# Patient Record
Sex: FEMALE | Race: WHITE | Employment: UNEMPLOYED | URBAN - METROPOLITAN AREA
[De-identification: names, ages, dates, MRNs, and addresses within clinical notes are randomized per-mention and may not be internally consistent; named-entity substitution may affect disease eponyms.]

---

## 2017-10-10 ENCOUNTER — OFFICE VISIT (OUTPATIENT)
Dept: INTERNAL MEDICINE CLINIC | Age: 62
End: 2017-10-10

## 2017-10-10 VITALS
HEIGHT: 64 IN | DIASTOLIC BLOOD PRESSURE: 90 MMHG | HEART RATE: 65 BPM | BODY MASS INDEX: 28.17 KG/M2 | TEMPERATURE: 98.2 F | OXYGEN SATURATION: 97 % | SYSTOLIC BLOOD PRESSURE: 145 MMHG | WEIGHT: 165 LBS

## 2017-10-10 DIAGNOSIS — E78.5 HYPERLIPIDEMIA, UNSPECIFIED HYPERLIPIDEMIA TYPE: ICD-10-CM

## 2017-10-10 DIAGNOSIS — Z23 ENCOUNTER FOR IMMUNIZATION: ICD-10-CM

## 2017-10-10 DIAGNOSIS — K21.9 GASTROESOPHAGEAL REFLUX DISEASE, ESOPHAGITIS PRESENCE NOT SPECIFIED: ICD-10-CM

## 2017-10-10 DIAGNOSIS — G43.809 OTHER MIGRAINE WITHOUT STATUS MIGRAINOSUS, NOT INTRACTABLE: ICD-10-CM

## 2017-10-10 DIAGNOSIS — J30.89 NON-SEASONAL ALLERGIC RHINITIS DUE TO OTHER ALLERGIC TRIGGER, UNSPECIFIED CHRONICITY: ICD-10-CM

## 2017-10-10 DIAGNOSIS — R03.0 ELEVATED BP WITHOUT DIAGNOSIS OF HYPERTENSION: ICD-10-CM

## 2017-10-10 DIAGNOSIS — R73.01 IFG (IMPAIRED FASTING GLUCOSE): ICD-10-CM

## 2017-10-10 DIAGNOSIS — Z76.89 ENCOUNTER TO ESTABLISH CARE WITH NEW DOCTOR: Primary | ICD-10-CM

## 2017-10-10 RX ORDER — SIMVASTATIN 20 MG/1
TABLET, FILM COATED ORAL
COMMUNITY
End: 2017-11-15 | Stop reason: SDUPTHER

## 2017-10-10 RX ORDER — FLUTICASONE PROPIONATE 50 MCG
2 SPRAY, SUSPENSION (ML) NASAL DAILY
COMMUNITY
End: 2017-10-12 | Stop reason: SDUPTHER

## 2017-10-10 RX ORDER — RANITIDINE 300 MG/1
CAPSULE ORAL
COMMUNITY
End: 2017-10-10 | Stop reason: ALTCHOICE

## 2017-10-10 RX ORDER — RANITIDINE 150 MG/1
150 TABLET, FILM COATED ORAL 2 TIMES DAILY
Qty: 180 TAB | Refills: 3 | Status: SHIPPED | OUTPATIENT
Start: 2017-10-10 | End: 2017-11-15 | Stop reason: SDUPTHER

## 2017-10-10 RX ORDER — ALPRAZOLAM 0.5 MG/1
TABLET ORAL
COMMUNITY
End: 2018-04-10 | Stop reason: SDUPTHER

## 2017-10-10 RX ORDER — BUTALBITAL, ACETAMINOPHEN AND CAFFEINE 300; 40; 50 MG/1; MG/1; MG/1
1 CAPSULE ORAL
Qty: 20 CAP | Refills: 1 | Status: SHIPPED | OUTPATIENT
Start: 2017-10-10 | End: 2017-12-28 | Stop reason: SDUPTHER

## 2017-10-10 NOTE — MR AVS SNAPSHOT
Visit Information Date & Time Provider Department Dept. Phone Encounter #  
 10/10/2017 10:15 AM Mike Morales MD [facility] [address] [id number] Follow-up Instructions Return in about 6 months (around 4/10/2018) for Physical.  
  
Upcoming Health Maintenance Date Due Hepatitis C Screening 1955 DTaP/Tdap/Td series (1 - Tdap) 4/5/1974 PAP AKA CERVICAL CYTOLOGY 4/5/1974 BREAST CANCER SCRN MAMMOGRAM 4/5/2003 FOBT Q 1 YEAR AGE 50-75 4/5/2003 ZOSTER VACCINE AGE 60> 2/5/2013 INFLUENZA AGE 9 TO ADULT 8/1/2017 Allergies as of 10/10/2017  Review Complete On: 10/10/2017 By: Adan Tariq LPN Severity Noted Reaction Type Reactions Aspirin  10/10/2017    Not Reported This Time Bupropion  10/10/2017    Nausea and Vomiting Floxin [Ofloxacin]  10/10/2017    Rash Ibuprofen  10/10/2017    Swelling Maxalt [Rizatriptan]  10/10/2017    Palpitations Prevacid [Lansoprazole]  10/10/2017    Rash Wellbutrin [Bupropion Hcl]  10/10/2017    Rash Current Immunizations  Never Reviewed Name Date Influenza Vaccine (Quad) PF  Incomplete Not reviewed this visit You Were Diagnosed With   
  
 Codes Comments Encounter to establish care with new doctor    -  Primary ICD-10-CM: Z76.89 
ICD-9-CM: V65.8 Hyperlipidemia, unspecified hyperlipidemia type     ICD-10-CM: E78.5 ICD-9-CM: 272.4 Elevated BP without diagnosis of hypertension     ICD-10-CM: R03.0 ICD-9-CM: 796.2 Non-seasonal allergic rhinitis due to other allergic trigger, unspecified chronicity     ICD-10-CM: J30.89 ICD-9-CM: 477.8 Gastroesophageal reflux disease, esophagitis presence not specified     ICD-10-CM: K21.9 ICD-9-CM: 530.81 Other migraine without status migrainosus, not intractable     ICD-10-CM: M45.266 ICD-9-CM: 346.80  IFG (impaired fasting glucose)     ICD-10-CM: R73.01 
ICD-9-CM: 790.21   
 Encounter for immunization     ICD-10-CM: K20 ICD-9-CM: V03.89 Vitals BP Pulse Temp Height(growth percentile) Weight(growth percentile) SpO2  
 155/88 (BP 1 Location: Right arm, BP Patient Position: Sitting) 65 98.2 °F (36.8 °C) (Oral) 5' 4\" (1.626 m) 165 lb (74.8 kg) 97% BMI Smoking Status 28.32 kg/m2 Never Smoker Vitals History BMI and BSA Data Body Mass Index Body Surface Area  
 28.32 kg/m 2 1.84 m 2 Preferred Pharmacy Pharmacy Name Phone [id number] Eleazar Szymanskiyousuf [address] [place] [address] [phone] Your Updated Medication List  
  
   
This list is accurate as of: 10/10/17 10:50 AM.  Always use your most recent med list.  
  
  
  
  
 butalbital-acetaminophen-caff -40 mg per capsule Commonly known as:  [employer] Take 1 Cap by mouth every six (6) hours as needed for Pain. Max Daily Amount: 4 Caps. FLONASE 50 mcg/actuation nasal spray Generic drug:  fluticasone 2 Sprays by Both Nostrils route daily. * OTHER Juice plus * OTHER Stool softener  
  
 raNITIdine 150 mg tablet Commonly known as:  ZANTAC Take 1 Tab by mouth two (2) times a day. simvastatin 20 mg tablet Commonly known as:  ZOCOR Take  by mouth nightly. XANAX 0.5 mg tablet Generic drug:  ALPRAZolam  
Take  by mouth. * Notice: This list has 2 medication(s) that are the same as other medications prescribed for you. Read the directions carefully, and ask your doctor or other care provider to review them with you. Prescriptions Printed Refills  
 butalbital-acetaminophen-caff (FIORICET) -40 mg per capsule 1 Sig: Take 1 Cap by mouth every six (6) hours as needed for Pain. Max Daily Amount: 4 Caps. Class: Print Route: Oral  
  
Prescriptions Sent to Pharmacy Refills  
 raNITIdine (ZANTAC) 150 mg tablet 3 Sig: Take 1 Tab by mouth two (2) times a day.   
 Class: Normal  
 [facility] [address] [place], [place] [address] #: [phone] Route: Oral  
  
We Performed the Following CBC WITH AUTOMATED DIFF [93315 CPT(R)] HEMOGLOBIN A1C WITH EAG [83017 CPT(R)] INFLUENZA VIRUS VAC QUAD,SPLIT,PRESV FREE SYRINGE IM [id number] CPT(R)] LIPID PANEL [48742 CPT(R)] METABOLIC PANEL, COMPREHENSIVE [08673 CPT(R)] AR IMMUNIZ ADMIN,1 SINGLE/COMB VAC/TOXOID [id number] CPT(R)] Follow-up Instructions Return in about 6 months (around 4/10/2018) for Physical.  
  
  
Introducing [address]! Michelle Green introduces [employer] patient portal. Now you can access parts of your medical record, email your doctor's office, and request medication refills online. 1. In your internet browser, go to [email or web address]. [email or web address] 2. Click on the First Time User? Click Here link in the Sign In box. You will see the New Member Sign Up page. 3. Enter your [employer] Access Code exactly as it appears below. You will not need to use this code after youve completed the sign-up process. If you do not sign up before the expiration date, you must request a new code. · [employer] Access Code: [email or web address] Expires: 1/8/2018 10:50 AM 
 
4. Enter the last four digits of your Social Security Number (xxxx) and Date of Birth (mm/dd/yyyy) as indicated and click Submit. You will be taken to the next sign-up page. 5. Create a [employer] ID. This will be your [employer] login ID and cannot be changed, so think of one that is secure and easy to remember. 6. Create a [employer] password. You can change your password at any time. 7. Enter your Password Reset Question and Answer. This can be used at a later time if you forget your password. 8. Enter your e-mail address. You will receive e-mail notification when new information is available in [address] 9. Click Sign Up. You can now view and download portions of your medical record. 10. Click the Download Summary menu link to download a portable copy of your medical information. If you have questions, please visit the Frequently Asked Questions section of the [employer] website. Remember, [employer] is NOT to be used for urgent needs. For medical emergencies, dial 911. Now available from your iPhone and Android! Please provide this summary of care documentation to your next provider. If you have any questions after today's visit, please call [phone].

## 2017-10-10 NOTE — PROGRESS NOTES
Cheryle Chimera is a 57 y.o. female who presents today for Establish Care (Dr Gigi Campos, previous PCP retired)        She has a history of   Patient Active Problem List   Diagnosis Code    Hyperlipidemia E78.5    IFG (impaired fasting glucose) R73.01    Gastroesophageal reflux disease K21.9     Today patient is here to establish care. Previous PCP Dr. Gigi Campos with [facility]. she is switching because she retired. .  Records are not available for me to review. I see Ms. Heath her friend. she does not have other concerns. Elevated BP: Treated years ago. On no meds for some time. Not checking regularly. HLD: on Zocor. Last checked in April and LDL is at 76. Anxiety: on PRN xanax. Last filled in October. Not filling monthly. AR: not using daily. History of shoulder issues. Rotator cuff repair to both sides. Social: Taking care of grand-daughter. Trained in [employer]. No tobacco, Social Tobacco.     Lives with Sasha Lemos at home.     has 3 children, 5 grandchildren. Family: Father with Celiacs. Mother:     HM:   GYN: [facility]. MMG UTD and normal   C-scope: UTD. ROS  Review of Systems   Constitutional: Negative for chills, fever, malaise/fatigue and weight loss. HENT: Negative for ear pain, hearing loss and tinnitus. Eyes: Negative for blurred vision, double vision, photophobia and pain. Respiratory: Negative for cough, hemoptysis, sputum production and shortness of breath. Cardiovascular: Negative for chest pain, palpitations, orthopnea, claudication and leg swelling. Gastrointestinal: Positive for abdominal pain (Stable). Negative for blood in stool, constipation, diarrhea, heartburn, nausea and vomiting. Genitourinary: Negative for dysuria, frequency, hematuria and urgency. Skin: Negative for itching and rash. Neurological: Negative. Endo/Heme/Allergies: Does not bruise/bleed easily.    Psychiatric/Behavioral: Negative for depression. The patient is not nervous/anxious. Visit Vitals    /90    Pulse 65    Temp 98.2 °F (36.8 °C) (Oral)    Ht 5' 4\" (1.626 m)    Wt 165 lb (74.8 kg)    SpO2 97%    BMI 28.32 kg/m2       Physical Exam   Constitutional: She is oriented to person, place, and time and well-developed, well-nourished, and in no distress. No distress. HENT:   Head: Normocephalic and atraumatic. Eyes: Conjunctivae are normal. Pupils are equal, round, and reactive to light. Neck: Normal range of motion. Neck supple. Cardiovascular: Normal rate and regular rhythm. Exam reveals no gallop. No murmur heard. Pulmonary/Chest: Effort normal and breath sounds normal.   Abdominal: Soft. Bowel sounds are normal. She exhibits no distension. Neurological: She is alert and oriented to person, place, and time. Skin: Skin is dry. She is not diaphoretic. Psychiatric: Affect and judgment normal.       Current Outpatient Prescriptions   Medication Sig    simvastatin (ZOCOR) 20 mg tablet Take  by mouth nightly.  fluticasone (FLONASE) 50 mcg/actuation nasal spray 2 Sprays by Both Nostrils route daily.  ALPRAZolam (XANAX) 0.5 mg tablet Take  by mouth.  OTHER Juice plus    OTHER Stool softener    raNITIdine (ZANTAC) 150 mg tablet Take 1 Tab by mouth two (2) times a day.  butalbital-acetaminophen-caff (FIORICET) -40 mg per capsule Take 1 Cap by mouth every six (6) hours as needed for Pain. Max Daily Amount: 4 Caps. No current facility-administered medications for this visit.          Past Medical History:   Diagnosis Date    Anxiety     Gluten intolerance     Sinus problem       Past Surgical History:   Procedure Laterality Date    HX SHOULDER ARTHROSCOPY      REPR VAGINAL PROLAPSE,UTEROSACRAL        Social History   Substance Use Topics    Smoking status: Never Smoker    Smokeless tobacco: Never Used    Alcohol use Yes      Comment: wine      Family History   Problem Relation Age of Onset    Diabetes Mother     Hypertension Mother     Celiac Disease Father     Celiac Disease Brother     Diabetes Maternal Grandmother         Allergies   Allergen Reactions    Aspirin Not Reported This Time    Bupropion Nausea and Vomiting    Floxin [Ofloxacin] Rash    Ibuprofen Swelling    Maxalt [Rizatriptan] Palpitations    Prevacid [Lansoprazole] Rash    Wellbutrin [Bupropion Hcl] Rash        Assessment/Plan  Diagnoses and all orders for this visit:    1. Encounter to establish care with new doctor - will get old recs. 2. Hyperlipidemia, unspecified hyperlipidemia type - repeat. -     CBC WITH AUTOMATED DIFF  -     METABOLIC PANEL, COMPREHENSIVE  -     LIPID PANEL    3. Elevated BP without diagnosis of hypertension - monitor. 4. Non-seasonal allergic rhinitis due to other allergic trigger, unspecified chronicity - stable. Will send in flonase to mail in pharmacy. 5. Gastroesophageal reflux disease, esophagitis presence not specified - much better, cut back to 150mg BID. -     raNITIdine (ZANTAC) 150 mg tablet; Take 1 Tab by mouth two (2) times a day. 6. Other migraine without status migrainosus, not intractable - very rare. -     butalbital-acetaminophen-caff (FIORICET) -40 mg per capsule; Take 1 Cap by mouth every six (6) hours as needed for Pain. Max Daily Amount: 4 Caps. 7. IFG (impaired fasting glucose) - Repeat  -     HEMOGLOBIN A1C WITH EAG    8.  Encounter for immunization  -     INFLUENZA VIRUS VACCINE QUADRIVALENT, PRESERVATIVE FREE SYRINGE (32646)  -     NC IMMUNIZ ADMIN,1 SINGLE/COMB VAC/TOXOID        Follow-up Disposition:  Return in about 6 months (around 4/10/2018) for Physical.    Randal Peng MD  10/10/2017

## 2017-10-11 LAB
ALBUMIN SERPL-MCNC: 4.6 G/DL (ref 3.6–4.8)
ALBUMIN/GLOB SERPL: 1.9 {RATIO} (ref 1.2–2.2)
ALP SERPL-CCNC: 76 IU/L (ref 39–117)
ALT SERPL-CCNC: 18 IU/L (ref 0–32)
AST SERPL-CCNC: 28 IU/L (ref 0–40)
BASOPHILS # BLD AUTO: 0 X10E3/UL (ref 0–0.2)
BASOPHILS NFR BLD AUTO: 0 %
BILIRUB SERPL-MCNC: 0.6 MG/DL (ref 0–1.2)
BUN SERPL-MCNC: 12 MG/DL (ref 8–27)
BUN/CREAT SERPL: 14 (ref 12–28)
CALCIUM SERPL-MCNC: 9.5 MG/DL (ref 8.7–10.3)
CHLORIDE SERPL-SCNC: 100 MMOL/L (ref 96–106)
CHOLEST SERPL-MCNC: 167 MG/DL (ref 100–199)
CO2 SERPL-SCNC: 26 MMOL/L (ref 18–29)
CREAT SERPL-MCNC: 0.83 MG/DL (ref 0.57–1)
EOSINOPHIL # BLD AUTO: 0.1 X10E3/UL (ref 0–0.4)
EOSINOPHIL NFR BLD AUTO: 1 %
ERYTHROCYTE [DISTWIDTH] IN BLOOD BY AUTOMATED COUNT: 13.5 % (ref 12.3–15.4)
EST. AVERAGE GLUCOSE BLD GHB EST-MCNC: 108 MG/DL
GLOBULIN SER CALC-MCNC: 2.4 G/DL (ref 1.5–4.5)
GLUCOSE SERPL-MCNC: 87 MG/DL (ref 65–99)
HBA1C MFR BLD: 5.4 % (ref 4.8–5.6)
HCT VFR BLD AUTO: 43.9 % (ref 34–46.6)
HDLC SERPL-MCNC: 56 MG/DL
HGB BLD-MCNC: 14.4 G/DL (ref 11.1–15.9)
IMM GRANULOCYTES # BLD: 0 X10E3/UL (ref 0–0.1)
IMM GRANULOCYTES NFR BLD: 0 %
INTERPRETATION, 910389: NORMAL
LDLC SERPL CALC-MCNC: 87 MG/DL (ref 0–99)
LYMPHOCYTES # BLD AUTO: 1.7 X10E3/UL (ref 0.7–3.1)
LYMPHOCYTES NFR BLD AUTO: 25 %
MCH RBC QN AUTO: 30.4 PG (ref 26.6–33)
MCHC RBC AUTO-ENTMCNC: 32.8 G/DL (ref 31.5–35.7)
MCV RBC AUTO: 93 FL (ref 79–97)
MONOCYTES # BLD AUTO: 0.7 X10E3/UL (ref 0.1–0.9)
MONOCYTES NFR BLD AUTO: 11 %
NEUTROPHILS # BLD AUTO: 4.2 X10E3/UL (ref 1.4–7)
NEUTROPHILS NFR BLD AUTO: 63 %
PLATELET # BLD AUTO: 259 X10E3/UL (ref 150–379)
POTASSIUM SERPL-SCNC: 4.3 MMOL/L (ref 3.5–5.2)
PROT SERPL-MCNC: 7 G/DL (ref 6–8.5)
RBC # BLD AUTO: 4.74 X10E6/UL (ref 3.77–5.28)
SODIUM SERPL-SCNC: 143 MMOL/L (ref 134–144)
TRIGL SERPL-MCNC: 120 MG/DL (ref 0–149)
VLDLC SERPL CALC-MCNC: 24 MG/DL (ref 5–40)
WBC # BLD AUTO: 6.6 X10E3/UL (ref 3.4–10.8)

## 2017-10-12 DIAGNOSIS — J30.9 ALLERGIC RHINITIS, UNSPECIFIED CHRONICITY, UNSPECIFIED SEASONALITY, UNSPECIFIED TRIGGER: Primary | ICD-10-CM

## 2017-10-12 RX ORDER — FLUTICASONE PROPIONATE 50 MCG
2 SPRAY, SUSPENSION (ML) NASAL DAILY
Qty: 3 BOTTLE | Refills: 3 | Status: SHIPPED | OUTPATIENT
Start: 2017-10-12 | End: 2018-04-06 | Stop reason: SDUPTHER

## 2017-11-13 ENCOUNTER — TELEPHONE (OUTPATIENT)
Dept: INTERNAL MEDICINE CLINIC | Age: 62
End: 2017-11-13

## 2017-11-13 DIAGNOSIS — J11.1 INFLUENZA: Primary | ICD-10-CM

## 2017-11-13 RX ORDER — OSELTAMIVIR PHOSPHATE 75 MG/1
75 CAPSULE ORAL DAILY
Qty: 10 CAP | Refills: 0 | Status: SHIPPED | OUTPATIENT
Start: 2017-11-13 | End: 2017-11-15 | Stop reason: SDUPTHER

## 2017-11-13 NOTE — TELEPHONE ENCOUNTER
----- Message from Nelli Campos Dr sent at 11/13/2017 10:03 AM EST -----  Regarding: Dr. Arianna Roldan / Telephone   Contact: [phone]  Pt needs to see if she needs Tamiflu, since her grand-daughter tested positive for Type A Flu and has had the flu shot. 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

## 2017-11-14 ENCOUNTER — PATIENT MESSAGE (OUTPATIENT)
Dept: INTERNAL MEDICINE CLINIC | Age: 62
End: 2017-11-14

## 2017-11-14 DIAGNOSIS — J11.1 INFLUENZA: ICD-10-CM

## 2017-11-15 DIAGNOSIS — E78.5 HYPERLIPIDEMIA, UNSPECIFIED HYPERLIPIDEMIA TYPE: Primary | ICD-10-CM

## 2017-11-15 RX ORDER — OSELTAMIVIR PHOSPHATE 75 MG/1
75 CAPSULE ORAL DAILY
Qty: 10 CAP | Refills: 0 | Status: SHIPPED | OUTPATIENT
Start: 2017-11-15 | End: 2017-11-25

## 2017-11-15 RX ORDER — SIMVASTATIN 20 MG/1
20 TABLET, FILM COATED ORAL
Qty: 90 TAB | Refills: 1 | Status: SHIPPED | OUTPATIENT
Start: 2017-11-15 | End: 2018-04-06 | Stop reason: SDUPTHER

## 2017-11-15 NOTE — TELEPHONE ENCOUNTER
From: Alberta Landeros  To: Airam Davis MD  Sent: 11/14/2017 5:27 PM EST  Subject: Prescription Question    Can you please resend the scrip for Tamaflu. [facility] and [facility] never received [facility]!  Thanks

## 2018-04-06 DIAGNOSIS — K21.9 GASTROESOPHAGEAL REFLUX DISEASE, ESOPHAGITIS PRESENCE NOT SPECIFIED: ICD-10-CM

## 2018-04-06 DIAGNOSIS — E78.5 HYPERLIPIDEMIA, UNSPECIFIED HYPERLIPIDEMIA TYPE: ICD-10-CM

## 2018-04-06 RX ORDER — SIMVASTATIN 20 MG/1
20 TABLET, FILM COATED ORAL
Qty: 90 TAB | Refills: 1 | Status: SHIPPED | OUTPATIENT
Start: 2018-04-06 | End: 2018-09-14 | Stop reason: SDUPTHER

## 2018-04-06 RX ORDER — RANITIDINE 150 MG/1
150 TABLET, FILM COATED ORAL 2 TIMES DAILY
Qty: 180 TAB | Refills: 3 | Status: SHIPPED | OUTPATIENT
Start: 2018-04-06 | End: 2018-04-10 | Stop reason: SDUPTHER

## 2018-04-06 RX ORDER — FLUTICASONE PROPIONATE 50 MCG
2 SPRAY, SUSPENSION (ML) NASAL DAILY
Qty: 3 BOTTLE | Refills: 3 | Status: SHIPPED | OUTPATIENT
Start: 2018-04-06 | End: 2018-10-12 | Stop reason: SDUPTHER

## 2018-04-10 ENCOUNTER — OFFICE VISIT (OUTPATIENT)
Dept: INTERNAL MEDICINE CLINIC | Age: 63
End: 2018-04-10

## 2018-04-10 VITALS
HEART RATE: 61 BPM | OXYGEN SATURATION: 98 % | WEIGHT: 165 LBS | DIASTOLIC BLOOD PRESSURE: 83 MMHG | BODY MASS INDEX: 28.17 KG/M2 | RESPIRATION RATE: 16 BRPM | TEMPERATURE: 97.3 F | HEIGHT: 64 IN | SYSTOLIC BLOOD PRESSURE: 144 MMHG

## 2018-04-10 DIAGNOSIS — E78.5 HYPERLIPIDEMIA, UNSPECIFIED HYPERLIPIDEMIA TYPE: Primary | ICD-10-CM

## 2018-04-10 DIAGNOSIS — F41.9 ANXIETY: ICD-10-CM

## 2018-04-10 DIAGNOSIS — K21.9 GASTROESOPHAGEAL REFLUX DISEASE, ESOPHAGITIS PRESENCE NOT SPECIFIED: ICD-10-CM

## 2018-04-10 DIAGNOSIS — E55.9 VITAMIN D DEFICIENCY: ICD-10-CM

## 2018-04-10 RX ORDER — ALPRAZOLAM 0.5 MG/1
0.5 TABLET ORAL
Qty: 30 TAB | Refills: 1 | Status: SHIPPED | OUTPATIENT
Start: 2018-04-10 | End: 2018-07-18 | Stop reason: SDUPTHER

## 2018-04-10 RX ORDER — FEXOFENADINE HCL AND PSEUDOEPHEDRINE HCI 180; 240 MG/1; MG/1
1 TABLET, EXTENDED RELEASE ORAL DAILY
COMMUNITY
End: 2021-08-19 | Stop reason: ALTCHOICE

## 2018-04-10 RX ORDER — RANITIDINE 150 MG/1
150 TABLET, FILM COATED ORAL
Qty: 90 TAB | Refills: 3 | Status: SHIPPED | OUTPATIENT
Start: 2018-04-10 | End: 2019-12-18 | Stop reason: SDUPTHER

## 2018-04-10 NOTE — PROGRESS NOTES
Geoffrey Mitchell is a 70 y.o. female who presents today for Cholesterol Problem and GERD      She has a history of   Patient Active Problem List   Diagnosis Code    Hyperlipidemia E78.5    IFG (impaired fasting glucose) R73.01    Gastroesophageal reflux disease K21.9    Allergic rhinitis J30.9    Anxiety F41.9       Today patient is here for f/u.   she does not have other concerns. Anxiety is Stable: PRN xanax. Last fill in October. Hyperlipidemia - Continued diet and exercise. Discussed stopping statin. Currently she takes 20 mg of zocor  ROS: taking medications as instructed, no medication side effects noted  No new myalgias, no joint pains, no weakness  No TIA's, no chest pain on exertion, no dyspnea on exertion, no swelling of ankles. Lab Results   Component Value Date/Time    Cholesterol, total 167 10/10/2017 11:31 AM    HDL Cholesterol 56 10/10/2017 11:31 AM    LDL, calculated 87 10/10/2017 11:31 AM    VLDL, calculated 24 10/10/2017 11:31 AM    Triglyceride 120 10/10/2017 11:31 AM     Hypertension - diet controlled. Pt notes home readings are normal, 116//81. Never elevated. reports that she has never smoked. She has never used smokeless tobacco.    reports that she drinks alcohol. BP Readings from Last 2 Encounters:   04/10/18 144/83   10/10/17 145/90     GERD stable. Will cut back to QHS and otherwise PRN. ROS  Review of Systems   Constitutional: Negative for chills, fever and weight loss. HENT: Positive for congestion, sinus pain and sore throat. Negative for hearing loss and nosebleeds. Respiratory: Negative for cough, hemoptysis, sputum production, shortness of breath and stridor. Cardiovascular: Negative for chest pain, palpitations, orthopnea and leg swelling. Gastrointestinal: Negative for abdominal pain, blood in stool, constipation, diarrhea, nausea and vomiting. Genitourinary: Negative for dysuria, frequency and urgency.    Musculoskeletal: Negative for myalgias and neck pain. Skin: Negative for itching and rash. Neurological: Negative. Endo/Heme/Allergies: Does not bruise/bleed easily. Psychiatric/Behavioral: Negative for depression. The patient is nervous/anxious (Stable). Visit Vitals    /83 (BP 1 Location: Left arm, BP Patient Position: Sitting)    Pulse 61    Temp 97.3 °F (36.3 °C) (Oral)    Resp 16    Ht 5' 4\" (1.626 m)    Wt 165 lb (74.8 kg)    SpO2 98%    BMI 28.32 kg/m2       Physical Exam   Constitutional: She is oriented to person, place, and time. She appears well-developed and well-nourished. HENT:   Head: Normocephalic and atraumatic. Cardiovascular: Normal rate and regular rhythm. No murmur heard. Pulmonary/Chest: Effort normal. No respiratory distress. Neurological: She is alert and oriented to person, place, and time. Skin: Skin is warm and dry. Psychiatric: She has a normal mood and affect. Her behavior is normal.         Current Outpatient Prescriptions   Medication Sig    fexofenadine-pseudoephedrine (ALLEGRA-D 24 HOUR) 180-240 mg per tablet Take 1 Tab by mouth daily.  raNITIdine (ZANTAC) 150 mg tablet Take 1 Tab by mouth nightly.  ALPRAZolam (XANAX) 0.5 mg tablet Take 1 Tab by mouth two (2) times daily as needed for Anxiety. Max Daily Amount: 1 mg.  simvastatin (ZOCOR) 20 mg tablet Take 1 Tab by mouth nightly.  fluticasone (FLONASE) 50 mcg/actuation nasal spray 2 Sprays by Both Nostrils route daily.  butalbital-acetaminophen-caff (FIORICET) -40 mg per capsule TAKE 1 CAPSULE BY MOUTH EVERY 6 HOURS AS NEEDED FOR PAIN    OTHER Juice plus    OTHER Stool softener     No current facility-administered medications for this visit.          Past Medical History:   Diagnosis Date    Anxiety     Gluten intolerance     Sinus problem       Past Surgical History:   Procedure Laterality Date    HX SHOULDER ARTHROSCOPY      REPR VAGINAL PROLAPSE,UTEROSACRAL        Social History   Substance Use Topics    Smoking status: Never Smoker    Smokeless tobacco: Never Used    Alcohol use Yes      Comment: wine      Family History   Problem Relation Age of Onset    Diabetes Mother     Hypertension Mother     Celiac Disease Father     Celiac Disease Brother     Diabetes Maternal Grandmother         Allergies   Allergen Reactions    Aspirin Not Reported This Time    Bupropion Nausea and Vomiting    Floxin [Ofloxacin] Rash    Ibuprofen Swelling    Maxalt [Rizatriptan] Palpitations    Prevacid [Lansoprazole] Rash    Wellbutrin [Bupropion Hcl] Rash        Assessment/Plan  Diagnoses and all orders for this visit:    1. Hyperlipidemia, unspecified hyperlipidemia type - If LDL lower or stable, stop and 3 mos f/u. If up keep vonnie dose. -     LIPID PANEL  -     METABOLIC PANEL, COMPREHENSIVE  -     VITAMIN D, 25 HYDROXY    2. Gastroesophageal reflux disease, esophagitis presence not specified - change to once daily and if controlled to PRN. Better since weight loss. -     raNITIdine (ZANTAC) 150 mg tablet; Take 1 Tab by mouth nightly. 3. Vitamin D deficiency  -     METABOLIC PANEL, COMPREHENSIVE  -     VITAMIN D, 25 HYDROXY    4. Anxiety - Rarely need. Last fill in October. Refill today. -     ALPRAZolam (XANAX) 0.5 mg tablet; Take 1 Tab by mouth two (2) times daily as needed for Anxiety. Max Daily Amount: 1 mg. Follow-up Disposition:  Return in about 6 months (around 10/10/2018) for Welcome to medicare.     Ger Motley MD  4/10/2018

## 2018-04-10 NOTE — MR AVS SNAPSHOT
Karl Nguyen 
 
 
 [address] [place] [address] 
[phone] Patient: Yvette Mead MRN: [id number] XAE:[birth year] Visit Information Date & Time Provider Department Dept. Phone Encounter #  
 4/10/2018  8:15 AM Enedina Coley MD [facility] [phone] [id number] Follow-up Instructions Return in about 6 months (around 10/10/2018) for Welcome to medicare. Upcoming Health Maintenance Date Due Hepatitis C Screening 1955 DTaP/Tdap/Td series (1 - Tdap) 4/5/1974 PAP AKA CERVICAL CYTOLOGY 4/5/1974 BREAST CANCER SCRN MAMMOGRAM 4/5/2003 FOBT Q 1 YEAR AGE 50-75 4/5/2003 ZOSTER VACCINE AGE 60> 2/5/2013 GLAUCOMA SCREENING Q2Y 4/5/2018 Bone Densitometry (Dexa) Screening 4/5/2018 Pneumococcal 65+ Low/Medium Risk (1 of 2 - PCV13) 4/5/2018 Allergies as of 4/10/2018  Review Complete On: 3/84/3976 By: Nani Boateng Severity Noted Reaction Type Reactions Aspirin  10/10/2017    Not Reported This Time Bupropion  10/10/2017    Nausea and Vomiting Floxin [Ofloxacin]  10/10/2017    Rash Ibuprofen  10/10/2017    Swelling Maxalt [Rizatriptan]  10/10/2017    Palpitations Prevacid [Lansoprazole]  10/10/2017    Rash Wellbutrin [Bupropion Hcl]  10/10/2017    Rash Current Immunizations  Reviewed on 10/10/2017 Name Date Influenza Vaccine (Quad) PF 10/10/2017 Not reviewed this visit You Were Diagnosed With   
  
 Codes Comments Hyperlipidemia, unspecified hyperlipidemia type    -  Primary ICD-10-CM: E78.5 ICD-9-CM: 272.4 Gastroesophageal reflux disease, esophagitis presence not specified     ICD-10-CM: K21.9 ICD-9-CM: 530.81 Vitamin D deficiency     ICD-10-CM: E55.9 ICD-9-CM: 268.9 Anxiety     ICD-10-CM: F41.9 ICD-9-CM: 300.00 Vitals BP Pulse Temp Resp Height(growth percentile) Weight(growth percentile) 144/83 (BP 1 Location: Left arm, BP Patient Position: Sitting) 61 97.3 °F (36.3 °C) (Oral) 16 5' 4\" (1.626 m) 165 lb (74.8 kg) SpO2 BMI OB Status Smoking Status 98% 28.32 kg/m2 Postmenopausal Never Smoker Vitals History BMI and BSA Data Body Mass Index Body Surface Area  
 28.32 kg/m 2 1.84 m 2 Preferred Pharmacy Pharmacy Name Phone [facility] Lafleur [address] [place] [address] [facility] [address] [place] [address] [phone] Your Updated Medication List  
  
   
This list is accurate as of 4/10/18  8:59 AM.  Always use your most recent med list. ALLEGRA-D 24 HOUR 180-240 mg per tablet Generic drug:  fexofenadine-pseudoephedrine Take 1 Tab by mouth daily. ALPRAZolam 0.5 mg tablet Commonly known as:  Trula Crick Take 1 Tab by mouth two (2) times daily as needed for Anxiety. Max Daily Amount: 1 mg.  
  
 butalbital-acetaminophen-caff -40 mg per capsule Commonly known as:  [employer] TAKE 1 CAPSULE BY MOUTH EVERY 6 HOURS AS NEEDED FOR PAIN  
  
 fluticasone 50 mcg/actuation nasal spray Commonly known as:  Maryann Coffee 2 Sprays by Both Nostrils route daily. * OTHER Juice plus * OTHER Stool softener  
  
 raNITIdine 150 mg tablet Commonly known as:  ZANTAC Take 1 Tab by mouth nightly. simvastatin 20 mg tablet Commonly known as:  ZOCOR Take 1 Tab by mouth nightly. * Notice: This list has 2 medication(s) that are the same as other medications prescribed for you. Read the directions carefully, and ask your doctor or other care provider to review them with you. Prescriptions Printed Refills ALPRAZolam (XANAX) 0.5 mg tablet 1 Sig: Take 1 Tab by mouth two (2) times daily as needed for Anxiety. Max Daily Amount: 1 mg. Class: Print Route: Oral  
  
Prescriptions Sent to Pharmacy Refills  
 raNITIdine (ZANTAC) 150 mg tablet 3 Sig: Take 1 Tab by mouth nightly.   
 Class: Normal  
 Pharmacy: [facility], [address] [place] [address] #: [phone] Route: Oral  
  
We Performed the Following LIPID PANEL [46676 CPT(R)] METABOLIC PANEL, COMPREHENSIVE [16164 CPT(R)] VITAMIN D, 25 HYDROXY [id number] CPT(R)] Follow-up Instructions Return in about 6 months (around 10/10/2018) for Welcome to medicare. Introducing [facility]! Dear Mahad Barboza: Thank you for requesting a [employer] account. Our records indicate that you have previously registered for a [employer] account but its currently inactive. Please call our [employer] support line at [phone]. Additional Information If you have questions, please visit the Frequently Asked Questions section of the [employer] website at [email or web address] Remember, [employer] is NOT to be used for urgent needs. For medical emergencies, dial 911. Now available from your iPhone and Android! Please provide this summary of care documentation to your next provider. Your primary care clinician is listed as Alejandra Calderon. If you have any questions after today's visit, please call [phone].

## 2018-04-11 LAB
25(OH)D3+25(OH)D2 SERPL-MCNC: 18.2 NG/ML (ref 30–100)
ALBUMIN SERPL-MCNC: 4.6 G/DL (ref 3.6–4.8)
ALBUMIN/GLOB SERPL: 1.9 {RATIO} (ref 1.2–2.2)
ALP SERPL-CCNC: 78 IU/L (ref 39–117)
ALT SERPL-CCNC: 21 IU/L (ref 0–32)
AST SERPL-CCNC: 36 IU/L (ref 0–40)
BILIRUB SERPL-MCNC: 0.6 MG/DL (ref 0–1.2)
BUN SERPL-MCNC: 6 MG/DL (ref 8–27)
BUN/CREAT SERPL: 7 (ref 12–28)
CALCIUM SERPL-MCNC: 9.6 MG/DL (ref 8.7–10.3)
CHLORIDE SERPL-SCNC: 98 MMOL/L (ref 96–106)
CHOLEST SERPL-MCNC: 184 MG/DL (ref 100–199)
CO2 SERPL-SCNC: 25 MMOL/L (ref 18–29)
CREAT SERPL-MCNC: 0.86 MG/DL (ref 0.57–1)
GFR SERPLBLD CREATININE-BSD FMLA CKD-EPI: 71 ML/MIN/1.73
GFR SERPLBLD CREATININE-BSD FMLA CKD-EPI: 82 ML/MIN/1.73
GLOBULIN SER CALC-MCNC: 2.4 G/DL (ref 1.5–4.5)
GLUCOSE SERPL-MCNC: 87 MG/DL (ref 65–99)
HDLC SERPL-MCNC: 60 MG/DL
INTERPRETATION, 910389: NORMAL
LDLC SERPL CALC-MCNC: 105 MG/DL (ref 0–99)
POTASSIUM SERPL-SCNC: 4.2 MMOL/L (ref 3.5–5.2)
PROT SERPL-MCNC: 7 G/DL (ref 6–8.5)
SODIUM SERPL-SCNC: 141 MMOL/L (ref 134–144)
TRIGL SERPL-MCNC: 94 MG/DL (ref 0–149)
VLDLC SERPL CALC-MCNC: 19 MG/DL (ref 5–40)

## 2018-04-18 ENCOUNTER — TELEPHONE (OUTPATIENT)
Dept: INTERNAL MEDICINE CLINIC | Age: 63
End: 2018-04-18

## 2018-04-18 DIAGNOSIS — Z01.00 EXAMINATION OF EYES AND VISION: Primary | ICD-10-CM

## 2018-04-18 NOTE — TELEPHONE ENCOUNTER
----- Message from Candida Acevedo sent at 4/18/2018  4:00 PM EDT -----  Regarding: /Telephone  The pt is in need of a referral for an upcoming appointment on 4/19/18 at 9:00am to [address] [phone]. Best Contact [phone].

## 2018-04-18 NOTE — TELEPHONE ENCOUNTER
Dr Heriberto Gilliam ([facility] [address] [facility] [place] [address]    Phone [phone]    Fax [phone]    Eye Exams and Contact Lens     04/19/2018         [facility] ([facility])    [phone]

## 2018-04-18 NOTE — TELEPHONE ENCOUNTER
Referral has been Nancy Hernandez (Elizabeth Renteria) [phone] #  [id number]  No # Visits  99  Dates Covered  04/18/2018 - 04/18/2019

## 2018-05-04 ENCOUNTER — DOCUMENTATION ONLY (OUTPATIENT)
Dept: INTERNAL MEDICINE CLINIC | Age: 63
End: 2018-05-04

## 2018-05-04 NOTE — PROGRESS NOTES
Received medical records from Dr. Kavitha Sena, [address] [facility]. Vaccination record has been updated. Records have been placed on Dr. Dinorah Albert desk for review.

## 2018-08-08 ENCOUNTER — OFFICE VISIT (OUTPATIENT)
Dept: INTERNAL MEDICINE CLINIC | Age: 63
End: 2018-08-08

## 2018-08-08 VITALS
BODY MASS INDEX: 28.68 KG/M2 | SYSTOLIC BLOOD PRESSURE: 130 MMHG | RESPIRATION RATE: 16 BRPM | WEIGHT: 168 LBS | OXYGEN SATURATION: 96 % | DIASTOLIC BLOOD PRESSURE: 68 MMHG | HEART RATE: 76 BPM | TEMPERATURE: 98.4 F | HEIGHT: 64 IN

## 2018-08-08 DIAGNOSIS — Z71.89 ADVANCED DIRECTIVES, COUNSELING/DISCUSSION: ICD-10-CM

## 2018-08-08 DIAGNOSIS — Z11.59 ENCOUNTER FOR HEPATITIS C SCREENING TEST FOR LOW RISK PATIENT: ICD-10-CM

## 2018-08-08 DIAGNOSIS — Z00.00 WELCOME TO MEDICARE PREVENTIVE VISIT: Primary | ICD-10-CM

## 2018-08-08 DIAGNOSIS — F41.9 ANXIETY: ICD-10-CM

## 2018-08-08 DIAGNOSIS — Z13.31 SCREENING FOR DEPRESSION: ICD-10-CM

## 2018-08-08 DIAGNOSIS — E78.5 HYPERLIPIDEMIA, UNSPECIFIED HYPERLIPIDEMIA TYPE: ICD-10-CM

## 2018-08-08 DIAGNOSIS — E55.9 VITAMIN D DEFICIENCY: ICD-10-CM

## 2018-08-08 NOTE — ACP (ADVANCE CARE PLANNING)
Advance Care Planning    Advance Care Planning (ACP) Provider Conversation Snapshot    Date of ACP Conversation: 08/08/18  Persons included in Conversation:  patient  Length of ACP Conversation in minutes:  <16 minutes (Non-Billable)    Authorized Decision Maker (if patient is incapable of making informed decisions):    This person is:   Healthcare Agent/Medical Power of  under Advance Directive          For Patients with Decision Making Capacity:   Values/Goals: Exploration of values, goals, and preferences if recovery is not expected, even with continued medical treatment in the event of:  Imminent death  Severe, permanent brain injury    Conversation Outcomes / Follow-Up Plan:   Recommended completion of Advance Directive form after review of ACP materials and conversation with prospective healthcare agent   Recommended communicating the plan and making copies for the healthcare agent, personal physician, and others as appropriate (e.g., health system)  Recommended review of completed ACP document annually or upon change in health status

## 2018-08-08 NOTE — MR AVS SNAPSHOT
[facility] [place] 
 
 
 [address] [facility] [address] [facility] [address] [facility] 
[phone] Patient: Courtney Gannon MRN: [id number] EDGAR:[birth year] Visit Information Date & Time Provider Department Dept. Phone Encounter #  
 8/8/2018 11:30 AM Mike Morales MD [facility] [address]  [id number] Follow-up Instructions Return in about 6 months (around 2/8/2019). Upcoming Health Maintenance Date Due Hepatitis C Screening 1955 DTaP/Tdap/Td series (2 - Td) 10/1/2016 GLAUCOMA SCREENING Q2Y 4/5/2018 Bone Densitometry (Dexa) Screening 4/5/2018 Pneumococcal 65+ Low/Medium Risk (1 of 2 - PCV13) 4/5/2018 Influenza Age 5 to Adult 8/1/2018 BREAST CANCER SCRN MAMMOGRAM 8/31/2018 COLONOSCOPY 12/28/2025 Allergies as of 8/8/2018  Review Complete On: 9/0/5505 By: Nicolle Boateng Severity Noted Reaction Type Reactions Aspirin  10/10/2017    Not Reported This Time Bupropion  10/10/2017    Nausea and Vomiting Floxin [Ofloxacin]  10/10/2017    Rash Ibuprofen  10/10/2017    Swelling Maxalt [Rizatriptan]  10/10/2017    Palpitations Prevacid [Lansoprazole]  10/10/2017    Rash Wellbutrin [Bupropion Hcl]  10/10/2017    Rash Current Immunizations  Reviewed on 5/4/2018 Name Date Influenza Vaccine (Quad) PF 10/10/2017 MMR 10/19/2006 Tdap 10/1/2006 Zoster Vaccine, Live 9/23/2014 Not reviewed this visit You Were Diagnosed With   
  
 Codes Comments Anxiety    -  Primary ICD-10-CM: F41.9 ICD-9-CM: 300.00 Welcome to Medicare preventive visit     ICD-10-CM: Z00.00 ICD-9-CM: V70.0 Advanced directives, counseling/discussion     ICD-10-CM: Z71.89 ICD-9-CM: V65.49 Screening for depression     ICD-10-CM: Z13.89 ICD-9-CM: V79.0 Hyperlipidemia, unspecified hyperlipidemia type     ICD-10-CM: E78.5 ICD-9-CM: 272.4 Vitamin D deficiency     ICD-10-CM: E55.9 ICD-9-CM: 268.9 Encounter for hepatitis C screening test for low risk patient     ICD-10-CM: Z11.59 
ICD-9-CM: V73.89 Vitals BP Pulse Temp Resp Height(growth percentile) Weight(growth percentile) 130/68 (BP 1 Location: Left arm, BP Patient Position: Sitting) 76 98.4 °F (36.9 °C) (Oral) 16 5' 4\" (1.626 m) 168 lb (76.2 kg) SpO2 BMI OB Status Smoking Status 96% 28.84 kg/m2 Postmenopausal Never Smoker Vitals History BMI and BSA Data Body Mass Index Body Surface Area  
 28.84 kg/m 2 1.85 m 2 Preferred Pharmacy Pharmacy Name Phone [id number] Landen Chaves [phone] [facility] Your Updated Medication List  
  
   
This list is accurate as of 8/8/18 12:11 PM.  Always use your most recent med list. ALLEGRA-D 24 HOUR 180-240 mg per tablet Generic drug:  fexofenadine-pseudoephedrine Take 1 Tab by mouth daily. ALPRAZolam 0.5 mg tablet Commonly known as:  XANAX  
TAKE 1 TABLET BY MOUTH TWICE A DAY AS NEEDED FOR ANXIETY  
  
 butalbital-acetaminophen-caff -40 mg per capsule Commonly known as:  [employer] TAKE 1 CAPSULE BY MOUTH EVERY 6 HOURS AS NEEDED FOR PAIN  
  
 fluticasone 50 mcg/actuation nasal spray Commonly known as:  Maria T Reges 2 Sprays by Both Nostrils route daily. * OTHER Juice plus * OTHER Stool softener  
  
 pneumococcal 13 mel conj dip 0.5 mL Syrg injection Commonly known as:  PREVNAR 13 (PF)  
0.5 mL by IntraMUSCular route once for 1 dose. raNITIdine 150 mg tablet Commonly known as:  ZANTAC Take 1 Tab by mouth nightly. simvastatin 20 mg tablet Commonly known as:  ZOCOR Take 1 Tab by mouth nightly. * Notice: This list has 2 medication(s) that are the same as other medications prescribed for you. Read the directions carefully, and ask your doctor or other care provider to review them with you. Prescriptions Printed Refills pneumococcal 13 mel conj dip (PREVNAR 13, PF,) 0.5 mL syrg injection 0 Si.5 mL by IntraMUSCular route once for 1 dose. Class: Print Route: IntraMUSCular We Performed the Following AMB POC EKG ROUTINE W/ 12 LEADS, SCREEN () [ [facility]] [place] [address] [[id number] [facility]] HEPATITIS C AB [48962 CPT(R)] LIPID PANEL [05466 CPT(R)] METABOLIC PANEL, COMPREHENSIVE [53770 CPT(R)] VITAMIN D, 25 HYDROXY [id number] CPT(R)] Follow-up Instructions Return in about 6 months (around 2019). Patient Instructions Medicare Wellness Visit, Female The best way to live healthy is to have a lifestyle where you eat a well-balanced diet, exercise regularly, limit alcohol use, and quit all forms of tobacco/nicotine, if applicable. Regular preventive services are another way to keep healthy. Preventive services (vaccines, screening tests, monitoring & exams) can help personalize your care plan, which helps you manage your own care. Screening tests can find health problems at the earliest stages, when they are easiest to treat. [facility] follows the current, evidence-based guidelines published by the [facility] States Mauro Jimenez ([facility]) when recommending preventive services for our patients. Because we follow these guidelines, sometimes recommendations change over time as research supports it. (For example, mammograms used to be recommended annually. Even though Medicare will still pay for an annual mammogram, the newer guidelines recommend a mammogram every two years for women of average risk.) Of course, you and your provider may decide to screen more often for some diseases, based on your risk and co-morbidities (chronic disease you are already diagnosed with). Preventive services for you include: - Medicare offers their members a free annual wellness visit, which is time for you and your primary care provider to discuss and plan for your preventive service needs. Take advantage of this benefit every year! 
 
-All people over age 72 should receive the recommended pneumonia vaccines. Current USPSTF guidelines recommend a series of two vaccines for the best pneumonia protection.  
 
-All adults should have a yearly flu vaccine and a tetanus vaccine every 10 years. All adults age 61 years should receive a shingles vaccine once in their lifetime.   
 
-A bone mass density test is recommended when a woman turns 65 to screen for osteoporosis. This test is only recommended once as a screening. Some providers will use this same test as a disease monitoring tool if you already have osteoporosis. -All adults age 38-68 years who are overweight should have a diabetes screening test once every three years.  
 
-Other screening tests & preventive services for persons with diabetes include: an eye exam to screen for diabetic retinopathy, a kidney function test, a foot exam, and stricter control over your cholesterol.  
 
-Cardiovascular screening for adults with routine risk involves an electrocardiogram (ECG) at intervals determined by the provider.  
 
-Colorectal cancer screenings should be done for adults age 54-65 years with normal risk. There are a number of acceptable methods of screening for this type of cancer. Each test has its own benefits and drawbacks. Discuss with your provider what is most appropriate for you during your annual wellness visit. The different tests include: colonoscopy (considered the best screening method), a fecal occult blood test, a fecal DNA test, and sigmoidoscopy.  
 
-Breast cancer screenings are recommended every other year for women of normal risk age 54-69 years.  
 
-Cervical cancer screenings for women over age 72 are only recommended with certain risk factors.  
 
-All adults born between [facility] should be screened once for Hepatitis C.  
 
 Here is a list of your current Health Maintenance items (your personalized list of preventive services) with a due date: 
Health Maintenance Due Topic Date Due  
 Hepatitis C Test  1955  DTaP/Tdap/Td  (2 - Td) 10/01/2016  Glaucoma Screening   04/05/2018  Bone Mineral Density   04/05/2018  Pneumococcal Vaccine (1 of 2 - PCV13) 04/05/2018  Flu Vaccine  08/01/2018  Breast Cancer Screening  08/31/2018 Introducing [facility]! Dear Erica Santizo: Thank you for requesting a [employer] account. Our records indicate that you already have an active [employer] account. You can access your account anytime at [email or web address] Did you know that you can access your hospital and ER discharge instructions at any time in [employer]? You can also review all of your test results from your hospital stay or ER visit. Additional Information If you have questions, please visit the Frequently Asked Questions section of the [employer] website at [email or web address] Remember, [employer] is NOT to be used for urgent needs. For medical emergencies, dial 911. Now available from your iPhone and Android! Please provide this summary of care documentation to your next provider. Your primary care clinician is listed as Bertha Johnson. If you have any questions after today's visit, please call [phone].

## 2018-08-08 NOTE — PROGRESS NOTES
Jane Lo is a 70 y.o. female who presents today for Welcome To Medicare  . She has a history of   Patient Active Problem List   Diagnosis Code    Hyperlipidemia E78.5    IFG (impaired fasting glucose) R73.01    Gastroesophageal reflux disease K21.9    Allergic rhinitis J30.9    Anxiety F41.9   . Today patient is here for .   she does not have other concerns. Overall doing well. Hyperlipidemia  Currently she takes 20 mg of simvastatin. ROS: taking medications as instructed, no medication side effects noted  No new myalgias, no joint pains, no weakness  No TIA's, no chest pain on exertion, no dyspnea on exertion, no swelling of ankles. Lab Results   Component Value Date/Time    Cholesterol, total 184 04/10/2018 09:33 AM    HDL Cholesterol 60 04/10/2018 09:33 AM    LDL, calculated 105 (H) 04/10/2018 09:33 AM    VLDL, calculated 19 04/10/2018 09:33 AM    Triglyceride 94 04/10/2018 09:33 AM     Anxiety: use rare PRN Xanax. Rarely uses    Elevated BP: at home very good. Health maintenance hx includes:  Exercise: moderately active. Form of exercise: daily   Diet: generally follows a low fat low cholesterol diet, nonsmoker, alcohol intake none  Social: Taking care of grandchildren. Screening:    Colon cancer screening:  Last Colonoscopy: 2015 and   was normal   Breast cancer screening: last mammogram 2016 and   was normal   Cervical cancer screening: last PAP/Pelvic exam: 2016   and was normal.   Osteoporosis screening:  Last BMD:  2015 and revealed    - Normal      Immunizations:     Immunization History   Administered Date(s) Administered    Influenza Vaccine (Quad) PF 10/10/2017    MMR 10/19/2006    Tdap 10/01/2006    Zoster Vaccine, Live 09/23/2014      Immunization status: Prevnar Rx. ROS  Review of Systems   Constitutional: Negative for chills, fever and weight loss. Eyes: Negative for blurred vision, double vision, photophobia and pain.    Respiratory: Negative for cough, hemoptysis, sputum production and shortness of breath. Cardiovascular: Negative for chest pain, palpitations, orthopnea, claudication and leg swelling. Gastrointestinal: Positive for heartburn. Negative for abdominal pain, constipation, diarrhea, nausea and vomiting. Genitourinary: Negative for dysuria, frequency, hematuria and urgency. Musculoskeletal: Negative for back pain, myalgias and neck pain. Neurological: Negative. Endo/Heme/Allergies: Does not bruise/bleed easily. Psychiatric/Behavioral: Negative for depression. The patient is not nervous/anxious. Visit Vitals    /68 (BP 1 Location: Left arm, BP Patient Position: Sitting)    Pulse 76    Temp 98.4 °F (36.9 °C) (Oral)    Resp 16    Ht 5' 4\" (1.626 m)    Wt 168 lb (76.2 kg)    SpO2 96%    BMI 28.84 kg/m2       Physical Exam   Constitutional: She is oriented to person, place, and time. She appears well-developed and well-nourished. No distress. HENT:   Head: Normocephalic and atraumatic. Neck: Normal range of motion. Neck supple. No thyromegaly present. Cardiovascular: Normal rate and regular rhythm. No murmur heard. Pulmonary/Chest: Effort normal and breath sounds normal. She has no wheezes. Abdominal: Soft. Bowel sounds are normal. She exhibits no distension. Musculoskeletal: She exhibits no edema. Neurological: She is alert and oriented to person, place, and time. No cranial nerve deficit. Skin: Skin is warm and dry. Psychiatric: She has a normal mood and affect. Her behavior is normal.         Current Outpatient Prescriptions   Medication Sig    pneumococcal 13 mel conj dip (PREVNAR 13, PF,) 0.5 mL syrg injection 0.5 mL by IntraMUSCular route once for 1 dose.  ALPRAZolam (XANAX) 0.5 mg tablet TAKE 1 TABLET BY MOUTH TWICE A DAY AS NEEDED FOR ANXIETY    fexofenadine-pseudoephedrine (ALLEGRA-D 24 HOUR) 180-240 mg per tablet Take 1 Tab by mouth daily.     raNITIdine (ZANTAC) 150 mg tablet Take 1 Tab by mouth nightly.  simvastatin (ZOCOR) 20 mg tablet Take 1 Tab by mouth nightly.  fluticasone (FLONASE) 50 mcg/actuation nasal spray 2 Sprays by Both Nostrils route daily.  butalbital-acetaminophen-caff (FIORICET) -40 mg per capsule TAKE 1 CAPSULE BY MOUTH EVERY 6 HOURS AS NEEDED FOR PAIN    OTHER Juice plus    OTHER Stool softener     No current facility-administered medications for this visit. Past Medical History:   Diagnosis Date    Anxiety     Gluten intolerance     Sinus problem       Past Surgical History:   Procedure Laterality Date    HX SHOULDER ARTHROSCOPY      REPR VAGINAL PROLAPSE,UTEROSACRAL        Social History   Substance Use Topics    Smoking status: Never Smoker    Smokeless tobacco: Never Used    Alcohol use Yes      Comment: wine      Family History   Problem Relation Age of Onset    Diabetes Mother     Hypertension Mother     Celiac Disease Father     Celiac Disease Brother     Diabetes Maternal Grandmother         Allergies   Allergen Reactions    Aspirin Not Reported This Time    Bupropion Nausea and Vomiting    Floxin [Ofloxacin] Rash    Ibuprofen Swelling    Maxalt [Rizatriptan] Palpitations    Prevacid [Lansoprazole] Rash    Wellbutrin [Bupropion Hcl] Rash        Assessment/Plan  Diagnoses and all orders for this visit:    1. Welcome to Medicare preventive visit -  UTD. Discussed ACP and making a formal will. Pt is otherwise healthy. Suggest returning to same place for repeat MMG. Magalie Yates was counseled on age-appropriate/ guideline-based risk prevention behaviors and screening for a 70y.o. year old   female . We also discussed adjustments in screening based on family history if necessary. Printed instructions for preventative screening guidelines and healthy behaviors given to patient with after visit summary.    -     AMB POC EKG Screen ([id number]) (Only Orderable in first 12 months of Medicare)    2.  Advanced directives, counseling/discussion    3. Screening for depression  -     Depression Screen Annual    4. Anxiety - Stable. Very rare use of benzos. 5. Hyperlipidemia, unspecified hyperlipidemia type - Stable on statin. -     LIPID PANEL  -     VITAMIN D, 25 HYDROXY  -     METABOLIC PANEL, COMPREHENSIVE    6. Vitamin D deficiency - on replacement. -     VITAMIN D, 25 HYDROXY  -     METABOLIC PANEL, COMPREHENSIVE    7. Encounter for hepatitis C screening test for low risk patient  -     HEPATITIS C AB    Other orders  -     pneumococcal 13 mel conj dip (PREVNAR 13, PF,) 0.5 mL syrg injection; 0.5 mL by IntraMUSCular route once for 1 dose. Follow-up Disposition:  Return in about 6 months (around 2/8/2019). Hong Coleman MD  8/8/2018            This is a \"Welcome to [address] [place] [address]"  Initial Preventive Physical Examination (IPPE) providing Personalized Prevention Plan Services (Performed in the first 12 months of enrollment)    I have reviewed the patient's medical history in detail and updated the computerized patient record. History     Past Medical History:   Diagnosis Date    Anxiety     Gluten intolerance     Sinus problem       Past Surgical History:   Procedure Laterality Date    HX SHOULDER ARTHROSCOPY      REPR VAGINAL PROLAPSE,UTEROSACRAL       Current Outpatient Prescriptions   Medication Sig Dispense Refill    pneumococcal 13 mel conj dip (PREVNAR 13, PF,) 0.5 mL syrg injection 0.5 mL by IntraMUSCular route once for 1 dose. 0.5 mL 0    ALPRAZolam (XANAX) 0.5 mg tablet TAKE 1 TABLET BY MOUTH TWICE A DAY AS NEEDED FOR ANXIETY 30 Tab 1    fexofenadine-pseudoephedrine (ALLEGRA-D 24 HOUR) 180-240 mg per tablet Take 1 Tab by mouth daily.  raNITIdine (ZANTAC) 150 mg tablet Take 1 Tab by mouth nightly. 90 Tab 3    simvastatin (ZOCOR) 20 mg tablet Take 1 Tab by mouth nightly. 90 Tab 1    fluticasone (FLONASE) 50 mcg/actuation nasal spray 2 Sprays by Both Nostrils route daily.  3 Bottle 3  butalbital-acetaminophen-caff (FIORICET) -40 mg per capsule TAKE 1 CAPSULE BY MOUTH EVERY 6 HOURS AS NEEDED FOR PAIN 20 Cap 1    OTHER Juice plus      OTHER Stool softener       Allergies   Allergen Reactions    Aspirin Not Reported This Time    Bupropion Nausea and Vomiting    Floxin [Ofloxacin] Rash    Ibuprofen Swelling    Maxalt [Rizatriptan] Palpitations    Prevacid [Lansoprazole] Rash    Wellbutrin [Bupropion Hcl] Rash     Family History   Problem Relation Age of Onset    Diabetes Mother     Hypertension Mother     Celiac Disease Father     Celiac Disease Brother     Diabetes Maternal Grandmother      Social History   Substance Use Topics    Smoking status: Never Smoker    Smokeless tobacco: Never Used    Alcohol use Yes      Comment: wine     Diet, Lifestyle: No special diet    Exercise level: extremely active    Depression Risk Screen     PHQ over the last two weeks 8/8/2018   Little interest or pleasure in doing things Not at all   Feeling down, depressed, irritable, or hopeless Not at all   Total Score PHQ 2 0   Trouble falling or staying asleep, or sleeping too much Not at all   Feeling tired or having little energy Not at all   Poor appetite, weight loss, or overeating Not at all   Feeling bad about yourself - or that you are a failure or have let yourself or your family down Not at all   Trouble concentrating on things such as school, work, reading, or watching TV Not at all   Moving or speaking so slowly that other people could have noticed; or the opposite being so fidgety that others notice Not at all   Thoughts of being better off dead, or hurting yourself in some way Not at all   PHQ 9 Score 0   How difficult have these problems made it for you to do your work, take care of your home and get along with others Not difficult at all     Alcohol Risk Screen   You do not drink alcohol or very rarely. Functional Ability and Level of Safety   Hearing Loss  Hearing is good. Vision Screening  Vision is good. No exam data present      Activities of Daily Living  The home contains: no safety equipment. Patient does total self care    Fall Risk Screen  Fall Risk Assessment, last 12 mths 8/8/2018   Able to walk? Yes   Fall in past 12 months? No       Abuse Screen  Patient is not abused    Screening EKG   EKG order placed: Yes    Patient Care Team   Patient Care Team:  Brigid Bernard MD as PCP - General (Internal Medicine)     End of Life Planning   Advanced care planning directives were discussed with the patient and /or family/caregiver. Assessment/Plan   Education and counseling provided:  Are appropriate based on today's review and evaluation  End-of-Life planning (with patient's consent)  Pneumococcal Vaccine  Influenza Vaccine  Screening Mammography  Bone mass measurement (DEXA)    Diagnoses and all orders for this visit:    1. Welcome to Medicare preventive visit  -     AMB POC EKG Screen ([id number]) (Only Orderable in first 12 months of Medicare)    2. Advanced directives, counseling/discussion    3. Screening for depression  -     Depression Screen Annual    Other orders  -     pneumococcal 13 mel conj dip (PREVNAR 13, PF,) 0.5 mL syrg injection; 0.5 mL by IntraMUSCular route once for 1 dose.       Health Maintenance Due   Topic Date Due    Hepatitis C Screening  1955    DTaP/Tdap/Td series (2 - Td) 10/01/2016    GLAUCOMA SCREENING Q2Y  04/05/2018    Bone Densitometry (Dexa) Screening  04/05/2018    Pneumococcal 65+ Low/Medium Risk (1 of 2 - PCV13) 04/05/2018    Influenza Age 9 to Adult  08/01/2018    BREAST CANCER SCRN MAMMOGRAM  08/31/2018

## 2018-08-08 NOTE — PATIENT INSTRUCTIONS
Medicare Wellness Visit, Female    The best way to live healthy is to have a lifestyle where you eat a well-balanced diet, exercise regularly, limit alcohol use, and quit all forms of tobacco/nicotine, if applicable. Regular preventive services are another way to keep healthy. Preventive services (vaccines, screening tests, monitoring & exams) can help personalize your care plan, which helps you manage your own care. Screening tests can find health problems at the earliest stages, when they are easiest to treat. [facility] follows the current, evidence-based guidelines published by the [facility] ([facility]) when recommending preventive services for our patients. Because we follow these guidelines, sometimes recommendations change over time as research supports it. (For example, mammograms used to be recommended annually. Even though Medicare will still pay for an annual mammogram, the newer guidelines recommend a mammogram every two years for women of average risk.)    Of course, you and your provider may decide to screen more often for some diseases, based on your risk and co-morbidities (chronic disease you are already diagnosed with). Preventive services for you include:    - Medicare offers their members a free annual wellness visit, which is time for you and your primary care provider to discuss and plan for your preventive service needs. Take advantage of this benefit every year!    -All people over age 72 should receive the recommended pneumonia vaccines. Current USPSTF guidelines recommend a series of two vaccines for the best pneumonia protection.     -All adults should have a yearly flu vaccine and a tetanus vaccine every 10 years. All adults age 61 years should receive a shingles vaccine once in their lifetime.      -A bone mass density test is recommended when a woman turns 65 to screen for osteoporosis.  This test is only recommended once as a screening. Some providers will use this same test as a disease monitoring tool if you already have osteoporosis. -All adults age 38-68 years who are overweight should have a diabetes screening test once every three years.     -Other screening tests & preventive services for persons with diabetes include: an eye exam to screen for diabetic retinopathy, a kidney function test, a foot exam, and stricter control over your cholesterol.     -Cardiovascular screening for adults with routine risk involves an electrocardiogram (ECG) at intervals determined by the provider.     -Colorectal cancer screenings should be done for adults age 54-65 years with normal risk. There are a number of acceptable methods of screening for this type of cancer. Each test has its own benefits and drawbacks. Discuss with your provider what is most appropriate for you during your annual wellness visit. The different tests include: colonoscopy (considered the best screening method), a fecal occult blood test, a fecal DNA test, and sigmoidoscopy. -Breast cancer screenings are recommended every other year for women of normal risk age 54-69 years.     -Cervical cancer screenings for women over age 72 are only recommended with certain risk factors.     -All adults born between [facility] should be screened once for Hepatitis C.      Here is a list of your current Health Maintenance items (your personalized list of preventive services) with a due date:  Health Maintenance Due   Topic Date Due    Hepatitis C Test  1955    DTaP/Tdap/Td  (2 - Td) 10/01/2016    Glaucoma Screening   04/05/2018    Bone Mineral Density   04/05/2018    Pneumococcal Vaccine (1 of 2 - PCV13) 04/05/2018    Flu Vaccine  08/01/2018    Breast Cancer Screening  08/31/2018

## 2018-08-09 LAB
25(OH)D3+25(OH)D2 SERPL-MCNC: 45.7 NG/ML (ref 30–100)
ALBUMIN SERPL-MCNC: 4.5 G/DL (ref 3.6–4.8)
ALBUMIN/GLOB SERPL: 2.4 {RATIO} (ref 1.2–2.2)
ALP SERPL-CCNC: 69 IU/L (ref 39–117)
ALT SERPL-CCNC: 19 IU/L (ref 0–32)
AST SERPL-CCNC: 36 IU/L (ref 0–40)
BILIRUB SERPL-MCNC: 0.4 MG/DL (ref 0–1.2)
BUN SERPL-MCNC: 9 MG/DL (ref 8–27)
BUN/CREAT SERPL: 11 (ref 12–28)
CALCIUM SERPL-MCNC: 8.9 MG/DL (ref 8.7–10.3)
CHLORIDE SERPL-SCNC: 100 MMOL/L (ref 96–106)
CHOLEST SERPL-MCNC: 144 MG/DL (ref 100–199)
CO2 SERPL-SCNC: 24 MMOL/L (ref 20–29)
CREAT SERPL-MCNC: 0.83 MG/DL (ref 0.57–1)
GLOBULIN SER CALC-MCNC: 1.9 G/DL (ref 1.5–4.5)
GLUCOSE SERPL-MCNC: 94 MG/DL (ref 65–99)
HCV AB S/CO SERPL IA: <0.1 S/CO RATIO (ref 0–0.9)
HDLC SERPL-MCNC: 48 MG/DL
INTERPRETATION, 910389: NORMAL
LDLC SERPL CALC-MCNC: 61 MG/DL (ref 0–99)
POTASSIUM SERPL-SCNC: 4.4 MMOL/L (ref 3.5–5.2)
PROT SERPL-MCNC: 6.4 G/DL (ref 6–8.5)
SODIUM SERPL-SCNC: 138 MMOL/L (ref 134–144)
TRIGL SERPL-MCNC: 174 MG/DL (ref 0–149)
VLDLC SERPL CALC-MCNC: 35 MG/DL (ref 5–40)

## 2018-08-12 ENCOUNTER — PATIENT MESSAGE (OUTPATIENT)
Dept: INTERNAL MEDICINE CLINIC | Age: 63
End: 2018-08-12

## 2018-08-14 NOTE — TELEPHONE ENCOUNTER
From: Sherry Lopez  To: Glenny Mendoza MD  Sent: 8/12/2018 9:03 PM EDT  Subject: Prescription Question    I have a question about VITAMIN D, 25 HYDROXY resulted on 8/9/18, 11:37 AM.  Do you want me to continue Vit D supplement.  ?(because of my allergies and diet things aren't going to change )  If so please call a prescription into [facility] and my Home delivery   Thanks I

## 2018-08-15 ENCOUNTER — TELEPHONE (OUTPATIENT)
Dept: INTERNAL MEDICINE CLINIC | Age: 63
End: 2018-08-15

## 2018-08-15 DIAGNOSIS — E55.9 VITAMIN D DEFICIENCY: Primary | ICD-10-CM

## 2018-08-15 RX ORDER — CHOLECALCIFEROL TAB 125 MCG (5000 UNIT) 125 MCG
5000 TAB ORAL 2 TIMES DAILY
Qty: 180 TAB | Refills: 1 | Status: SHIPPED | OUTPATIENT
Start: 2018-08-15

## 2018-08-15 NOTE — TELEPHONE ENCOUNTER
----- Message from Jessica Adler sent at 8/14/2018  4:05 PM EDT -----  Regarding: Dr. Lucy Sams        Pt is returning a missed call, and stated Mahamed Fernandes is taking 5000 units for Vitamin D 2x daily.        Best contact [phone]

## 2018-09-05 ENCOUNTER — TELEPHONE (OUTPATIENT)
Dept: INTERNAL MEDICINE CLINIC | Age: 63
End: 2018-09-05

## 2018-09-05 NOTE — TELEPHONE ENCOUNTER
----- Message from Rosemary Sunshine sent at 9/5/2018 12:15 PM EDT -----  Regarding: Dr. Ronnell Vega  Patient fell about 10 days ago she is not in pain just sore and wants to know do you think she should come in to be evaluated. Her number is [phone]. She wants to know if she should go to be xrayed.

## 2018-09-05 NOTE — TELEPHONE ENCOUNTER
Spoke with patient who stated she had a fall 10 days ago after slipping on water, no pain or bruise just sore when applying pressure.  Advised patient to monitor the area, take OTC pain meds if needed and if contuines then she will need to be seen in office for evaluation

## 2018-09-14 DIAGNOSIS — E78.5 HYPERLIPIDEMIA, UNSPECIFIED HYPERLIPIDEMIA TYPE: ICD-10-CM

## 2018-09-14 RX ORDER — SIMVASTATIN 20 MG/1
20 TABLET, FILM COATED ORAL
Qty: 90 TAB | Refills: 1 | Status: SHIPPED | OUTPATIENT
Start: 2018-09-14 | End: 2018-12-09 | Stop reason: SDUPTHER

## 2018-10-12 RX ORDER — FLUTICASONE PROPIONATE 50 MCG
2 SPRAY, SUSPENSION (ML) NASAL DAILY
Qty: 3 BOTTLE | Refills: 3 | Status: SHIPPED | OUTPATIENT
Start: 2018-10-12 | End: 2020-04-09 | Stop reason: SDUPTHER

## 2018-10-12 NOTE — TELEPHONE ENCOUNTER
----- Message from Brittanie Parikh sent at 10/12/2018 10:13 AM EDT -----  Regarding: DR. Bravo/ Vin[place]  Pt requesting Flonase RX be sent to home delivery on file.  Best contact [phone]

## 2018-11-19 ENCOUNTER — TELEPHONE (OUTPATIENT)
Dept: INTERNAL MEDICINE CLINIC | Age: 63
End: 2018-11-19

## 2018-11-19 DIAGNOSIS — G43.809 OTHER MIGRAINE WITHOUT STATUS MIGRAINOSUS, NOT INTRACTABLE: ICD-10-CM

## 2018-11-19 RX ORDER — BUTALBITAL, ACETAMINOPHEN AND CAFFEINE 300; 40; 50 MG/1; MG/1; MG/1
CAPSULE ORAL
Qty: 20 CAP | Refills: 1 | Status: SHIPPED | OUTPATIENT
Start: 2018-11-19 | End: 2019-01-11 | Stop reason: SDUPTHER

## 2018-11-19 NOTE — TELEPHONE ENCOUNTER
Patient has headache from allergy would like for you to call in some Fioricet for her headache.  [phone]  Her no is [phone]

## 2018-12-03 ENCOUNTER — TELEPHONE (OUTPATIENT)
Dept: INTERNAL MEDICINE CLINIC | Age: 63
End: 2018-12-03

## 2018-12-03 NOTE — TELEPHONE ENCOUNTER
----- Message from WangYou Box sent at 12/3/2018  4:25 PM EST -----  Regarding: Dr. Mariposa Gr  Pt is requesting an order to [facility] for an urinalysis in regards to bladder infection. Best contact [phone].

## 2018-12-04 ENCOUNTER — OFFICE VISIT (OUTPATIENT)
Dept: INTERNAL MEDICINE CLINIC | Age: 63
End: 2018-12-04

## 2018-12-04 VITALS
OXYGEN SATURATION: 98 % | WEIGHT: 167 LBS | BODY MASS INDEX: 28.51 KG/M2 | TEMPERATURE: 97.8 F | HEART RATE: 78 BPM | RESPIRATION RATE: 16 BRPM | DIASTOLIC BLOOD PRESSURE: 82 MMHG | SYSTOLIC BLOOD PRESSURE: 127 MMHG | HEIGHT: 64 IN

## 2018-12-04 DIAGNOSIS — R30.0 DYSURIA: ICD-10-CM

## 2018-12-04 DIAGNOSIS — R10.2 PELVIC PAIN: Primary | ICD-10-CM

## 2018-12-04 LAB
BILIRUB UR QL STRIP: NEGATIVE
GLUCOSE UR-MCNC: NEGATIVE MG/DL
KETONES P FAST UR STRIP-MCNC: NEGATIVE MG/DL
PH UR STRIP: 7 [PH] (ref 4.6–8)
PROT UR QL STRIP: NEGATIVE
SP GR UR STRIP: 1.02 (ref 1–1.03)
UA UROBILINOGEN AMB POC: NORMAL (ref 0.2–1)
URINALYSIS CLARITY POC: CLEAR
URINALYSIS COLOR POC: YELLOW
URINE BLOOD POC: NEGATIVE
URINE LEUKOCYTES POC: NEGATIVE
URINE NITRITES POC: NEGATIVE

## 2018-12-04 NOTE — PROGRESS NOTES
Paty Pina is a 70 y.o. female who presents today for Pelvic Pain and Urinary Retention  . She has a history of   Patient Active Problem List   Diagnosis Code    Hyperlipidemia E78.5    IFG (impaired fasting glucose) R73.01    Gastroesophageal reflux disease K21.9    Allergic rhinitis J30.9    Anxiety F41.9   . Today patient is here for an acute visit. Urinary Problems:  Paty Pina is a 70 y.o. female who complains of frequency/urgency, pelvic pain x 7 days, without flank pain, fever, chills, nausea or vomiting. Urine has been cloudy/foul smelling.  her symptoms are moderate. she is drinking plenty of fluids for hydration. her history is significant for: recently two UTIs treated at [facility]. No soap changes. No dietary changes. Treated with bactrim twice. Pt has been on two rounds of abx in the past.  These have been treated by Bactrim both times last time was about a month ago  No  Dysuria. He does note that she has been more physically active recently on a trip. reports that she does not engage in sexual activity. .    No LMP recorded. Patient is postmenopausal.    ROS  Review of Systems   Constitutional: Negative for chills, fever and weight loss. Respiratory: Negative for cough and shortness of breath. Cardiovascular: Negative for chest pain, palpitations and leg swelling. Gastrointestinal: Negative for abdominal pain, nausea and vomiting. Genitourinary: Positive for frequency. Negative for dysuria, flank pain, hematuria and urgency. Musculoskeletal: Negative for back pain, joint pain, myalgias and neck pain. Neurological: Negative. Endo/Heme/Allergies: Does not bruise/bleed easily. Psychiatric/Behavioral: Negative for depression. The patient is not nervous/anxious.         Visit Vitals  BP (!) 158/100 (BP 1 Location: Left arm, BP Patient Position: Sitting)   Pulse 78   Temp 97.8 °F (36.6 °C) (Oral)   Resp 16   Ht 5' 4\" (1.626 m)   Wt 167 lb (75.8 kg) SpO2 98%   BMI 28.67 kg/m²       Physical Exam   Constitutional: She is oriented to person, place, and time. She appears well-developed and well-nourished. HENT:   Head: Normocephalic and atraumatic. Cardiovascular: Normal rate and regular rhythm. No murmur heard. Pulmonary/Chest: Effort normal. No respiratory distress. Abdominal: Soft. Bowel sounds are normal.       Mild discomfort where noted. Neurological: She is alert and oriented to person, place, and time. Skin: Skin is warm and dry. Psychiatric: She has a normal mood and affect. Her behavior is normal.         Current Outpatient Medications   Medication Sig    butalbital-acetaminophen-caff (FIORICET) -40 mg per capsule TAKE 1 CAPSULE BY MOUTH EVERY 6 HOURS AS NEEDED FOR PAIN    fluticasone (FLONASE) 50 mcg/actuation nasal spray 2 Sprays by Both Nostrils route daily.  simvastatin (ZOCOR) 20 mg tablet Take 1 Tab by mouth nightly.  cholecalciferol, VITAMIN D3, (VITAMIN D3) 5,000 unit tab tablet Take 1 Tab by mouth two (2) times a day.  ALPRAZolam (XANAX) 0.5 mg tablet TAKE 1 TABLET BY MOUTH TWICE A DAY AS NEEDED FOR ANXIETY    fexofenadine-pseudoephedrine (ALLEGRA-D 24 HOUR) 180-240 mg per tablet Take 1 Tab by mouth daily.  raNITIdine (ZANTAC) 150 mg tablet Take 1 Tab by mouth nightly.  OTHER Juice plus    OTHER Stool softener     No current facility-administered medications for this visit.          Past Medical History:   Diagnosis Date    Anxiety     Gluten intolerance     Sinus problem       Past Surgical History:   Procedure Laterality Date    HX SHOULDER ARTHROSCOPY      REPR VAGINAL PROLAPSE,UTEROSACRAL        Social History     Tobacco Use    Smoking status: Never Smoker    Smokeless tobacco: Never Used   Substance Use Topics    Alcohol use: Yes     Comment: wine      Family History   Problem Relation Age of Onset    Diabetes Mother     Hypertension Mother     Celiac Disease Father     Celiac Disease Brother     Diabetes Maternal Grandmother         Allergies   Allergen Reactions    Aspirin Not Reported This Time    Bupropion Nausea and Vomiting    Floxin [Ofloxacin] Rash    Ibuprofen Swelling    Maxalt [Rizatriptan] Palpitations    Prevacid [Lansoprazole] Rash    Wellbutrin [Bupropion Hcl] Rash        Assessment/Plan  Diagnoses and all orders for this visit:    1. Pelvic pain  -     AMB POC URINALYSIS DIP STICK MANUAL W/O MICRO  -     CULTURE, URINE    2. Dysuria -has had 2 UTIs treated in urgent care centers. We have requested these records. Both times he was treated with Bactrim. Patient has recurrence of lower pelvic pain. UA looks unremarkable we will not treat at this time will send for culture.   Suggest going back to GYN who had done a rectocele surgery in the past  -     CULTURE, URINE        Follow-up Disposition: Not on File    Lucy De Luna MD  12/4/2018

## 2018-12-06 LAB — BACTERIA UR CULT: NO GROWTH

## 2018-12-09 DIAGNOSIS — E78.5 HYPERLIPIDEMIA, UNSPECIFIED HYPERLIPIDEMIA TYPE: ICD-10-CM

## 2018-12-09 RX ORDER — SIMVASTATIN 20 MG/1
TABLET, FILM COATED ORAL
Qty: 90 TAB | Refills: 1 | Status: SHIPPED | OUTPATIENT
Start: 2018-12-09 | End: 2019-08-28 | Stop reason: SDUPTHER

## 2018-12-13 DIAGNOSIS — F41.9 ANXIETY: ICD-10-CM

## 2018-12-13 RX ORDER — ALPRAZOLAM 0.5 MG/1
TABLET ORAL
Qty: 30 TAB | Refills: 1 | OUTPATIENT
Start: 2018-12-13 | End: 2019-02-11 | Stop reason: SDUPTHER

## 2019-01-11 DIAGNOSIS — G43.809 OTHER MIGRAINE WITHOUT STATUS MIGRAINOSUS, NOT INTRACTABLE: ICD-10-CM

## 2019-01-11 NOTE — TELEPHONE ENCOUNTER
----- Message from Jerad Rojas sent at 1/11/2019  4:23 PM EST -----  Regarding: Dr. Belcher Pock: [phone]  Pt requesting a Rx Fioricet (unknown dosage).  [facility] on file at [phone]

## 2019-01-14 RX ORDER — BUTALBITAL, ACETAMINOPHEN AND CAFFEINE 300; 40; 50 MG/1; MG/1; MG/1
CAPSULE ORAL
Qty: 20 CAP | Refills: 1 | Status: SHIPPED | OUTPATIENT
Start: 2019-01-14 | End: 2019-05-01 | Stop reason: SDUPTHER

## 2019-02-01 ENCOUNTER — TELEPHONE (OUTPATIENT)
Dept: INTERNAL MEDICINE CLINIC | Age: 64
End: 2019-02-01

## 2019-02-01 NOTE — TELEPHONE ENCOUNTER
----- Message from [facility] sent at 2/1/2019 11:18 AM EST -----  Regarding: Dr. Lazaro Love refill   Contact: [phone]  Pt is requesting a refill for Rx Hydrocodone.

## 2019-03-07 ENCOUNTER — TELEPHONE (OUTPATIENT)
Dept: INTERNAL MEDICINE CLINIC | Age: 64
End: 2019-03-07

## 2019-03-07 NOTE — TELEPHONE ENCOUNTER
Patient states she has a pretty bad sinus infection and is requesting an antibiotic, states she has a history with sinus infections and just  Need a medication sent in to her phaprmacy on file     Patient can be reached at  [phone]

## 2019-03-25 ENCOUNTER — TELEPHONE (OUTPATIENT)
Dept: INTERNAL MEDICINE CLINIC | Age: 64
End: 2019-03-25

## 2019-03-25 NOTE — TELEPHONE ENCOUNTER
----- Message from George Michaud sent at 3/25/2019  9:32 AM EDT -----  Regarding: Cashin/telephone  Pt is requesting an appointment today for stomach pains stated two days ago. I was unable to reach the office. Pts number is [phone].

## 2019-03-25 NOTE — TELEPHONE ENCOUNTER
Pt c/o abdominal pain in center of abdomen, malaise, fatigue, loss of appetite x 2 days. Pt reports some diarrhea, denies NV. Pain is 6/10. Appt has been scheduled for 3/26/19, advised Pt to go to ED if s/s worsen. Pt verbalized understanding.

## 2019-03-26 NOTE — TELEPHONE ENCOUNTER
Patient called to report that she is starting to feel better. Patient reports that she thinks it was from a probiotic she had taken. Patient wanted PSR to let nurse know she was CX her appointment. Patient stated she was very thankful for the nurse working her into the schedule but no longer needs appointment.

## 2019-05-01 DIAGNOSIS — G43.809 OTHER MIGRAINE WITHOUT STATUS MIGRAINOSUS, NOT INTRACTABLE: ICD-10-CM

## 2019-05-01 RX ORDER — BUTALBITAL, ACETAMINOPHEN AND CAFFEINE 300; 40; 50 MG/1; MG/1; MG/1
CAPSULE ORAL
Qty: 20 CAP | Refills: 1 | Status: SHIPPED | OUTPATIENT
Start: 2019-05-01 | End: 2019-06-28 | Stop reason: SDUPTHER

## 2019-06-03 DIAGNOSIS — K21.9 GASTROESOPHAGEAL REFLUX DISEASE, ESOPHAGITIS PRESENCE NOT SPECIFIED: ICD-10-CM

## 2019-06-03 RX ORDER — RANITIDINE 150 MG/1
TABLET, FILM COATED ORAL
Qty: 180 TAB | Refills: 1 | Status: SHIPPED | OUTPATIENT
Start: 2019-06-03 | End: 2019-10-03 | Stop reason: SDUPTHER

## 2019-06-06 ENCOUNTER — TELEPHONE (OUTPATIENT)
Dept: INTERNAL MEDICINE CLINIC | Age: 64
End: 2019-06-06

## 2019-06-06 NOTE — TELEPHONE ENCOUNTER
----- Message from Juju Schaeffer sent at 6/6/2019  8:24 AM EDT -----  Regarding: Dr. Tae De Luna  Patient advised she believes she may have an iron deficiency. Is having trouble concentrating, fatigue, irritability, and sluggishness. Would like for Dr. Maximilian Benz to start an order for lab work at [facility] at and then she would like to schedule an appointment once results have come back. Patient is a nurse. Best contact number is [phone].

## 2019-06-28 DIAGNOSIS — G43.809 OTHER MIGRAINE WITHOUT STATUS MIGRAINOSUS, NOT INTRACTABLE: ICD-10-CM

## 2019-06-28 RX ORDER — BUTALBITAL, ACETAMINOPHEN AND CAFFEINE 300; 40; 50 MG/1; MG/1; MG/1
CAPSULE ORAL
Qty: 20 CAP | Refills: 1 | Status: SHIPPED | OUTPATIENT
Start: 2019-06-28 | End: 2019-11-27 | Stop reason: SDUPTHER

## 2019-08-28 DIAGNOSIS — E78.5 HYPERLIPIDEMIA, UNSPECIFIED HYPERLIPIDEMIA TYPE: ICD-10-CM

## 2019-08-28 RX ORDER — SIMVASTATIN 20 MG/1
TABLET, FILM COATED ORAL
Qty: 90 TAB | Refills: 1 | Status: SHIPPED | OUTPATIENT
Start: 2019-08-28 | End: 2020-02-26

## 2019-10-03 ENCOUNTER — OFFICE VISIT (OUTPATIENT)
Dept: INTERNAL MEDICINE CLINIC | Age: 64
End: 2019-10-03

## 2019-10-03 VITALS
RESPIRATION RATE: 16 BRPM | HEIGHT: 64 IN | DIASTOLIC BLOOD PRESSURE: 79 MMHG | WEIGHT: 166 LBS | BODY MASS INDEX: 28.34 KG/M2 | TEMPERATURE: 98 F | SYSTOLIC BLOOD PRESSURE: 130 MMHG | HEART RATE: 70 BPM | OXYGEN SATURATION: 99 %

## 2019-10-03 DIAGNOSIS — E55.9 VITAMIN D DEFICIENCY: ICD-10-CM

## 2019-10-03 DIAGNOSIS — Z78.0 POSTMENOPAUSAL: ICD-10-CM

## 2019-10-03 DIAGNOSIS — Z13.31 SCREENING FOR DEPRESSION: ICD-10-CM

## 2019-10-03 DIAGNOSIS — Z00.00 MEDICARE ANNUAL WELLNESS VISIT, SUBSEQUENT: Primary | ICD-10-CM

## 2019-10-03 DIAGNOSIS — E78.5 HYPERLIPIDEMIA, UNSPECIFIED HYPERLIPIDEMIA TYPE: ICD-10-CM

## 2019-10-03 DIAGNOSIS — Z71.89 ADVANCED DIRECTIVES, COUNSELING/DISCUSSION: ICD-10-CM

## 2019-10-03 DIAGNOSIS — F41.9 ANXIETY: ICD-10-CM

## 2019-10-03 RX ORDER — ALPRAZOLAM 0.5 MG/1
TABLET ORAL
Qty: 30 TAB | Refills: 1 | Status: SHIPPED | OUTPATIENT
Start: 2019-10-03 | End: 2019-12-16 | Stop reason: SDUPTHER

## 2019-10-03 RX ORDER — VITAMIN E CAP 100 UNIT 100 UNIT
CAP ORAL DAILY
COMMUNITY
End: 2022-10-21 | Stop reason: ALTCHOICE

## 2019-10-03 RX ORDER — MULTIVIT WITH MINERALS/HERBS
1 TABLET ORAL DAILY
COMMUNITY
End: 2020-04-07

## 2019-10-03 RX ORDER — AZITHROMYCIN 250 MG/1
250 TABLET, FILM COATED ORAL DAILY
COMMUNITY
End: 2020-04-07

## 2019-10-03 NOTE — PROGRESS NOTES
Jo Abbott is a 75 y.o. female who presents today for Complete Physical  .      She has a history of   Patient Active Problem List   Diagnosis Code    Hyperlipidemia E78.5    IFG (impaired fasting glucose) R73.01    Gastroesophageal reflux disease K21.9    Allergic rhinitis J30.9    Anxiety F41.9   . Today patient is here for a Medicare wellness exam..   she does not have other concerns. Being treated for sinus issue. Hyperlipidemia  On simvastatin. ROS: taking medications as instructed, no medication side effects noted  No new myalgias, no joint pains, no weakness  No TIA's, no chest pain on exertion, no dyspnea on exertion, no swelling of ankles. Lab Results   Component Value Date/Time    Cholesterol, total 144 08/08/2018 12:00 AM    HDL Cholesterol 48 08/08/2018 12:00 AM    LDL, calculated 61 08/08/2018 12:00 AM    VLDL, calculated 35 08/08/2018 12:00 AM    Triglyceride 174 (H) 08/08/2018 12:00 AM       Health maintenance hx includes:  Exercise: moderately active. Form of exercise: daily walking. Diet: generally follows a low fat low cholesterol diet, nonsmoker, alcohol intake none  Social: Taking care of grandchildren. Screening:    Colon cancer screening:  Last Colonoscopy: 2015 and was normal   Breast cancer screening: last mammogram 2016 and was normal   Cervical cancer screening: last PAP/Pelvic exam: Sees GYN. Osteoporosis screening:  Last BMD:  Many years ago. Immunizations:     Immunization History   Administered Date(s) Administered    Influenza High Dose Vaccine PF 08/14/2018    Influenza Vaccine (Quad) 11/03/2016    Influenza Vaccine (Quad) PF 11/03/2016, 10/10/2017    MMR 10/19/2006    Pneumococcal Conjugate (PCV-13) 10/03/2018    Pneumococcal Polysaccharide (PPSV-23) 09/19/2019    Tdap 10/01/2006    Zoster Recombinant 05/17/2019, 09/04/2019    Zoster Vaccine, Live 09/23/2014      Immunization status: up to date and documented.       ROS  Review of Systems   Constitutional: Negative for chills, fever, malaise/fatigue and weight loss. HENT: Negative for congestion and sore throat. Eyes: Negative for blurred vision, double vision, photophobia and pain. Respiratory: Negative for cough and shortness of breath. Cardiovascular: Negative for chest pain, palpitations, orthopnea and leg swelling. Gastrointestinal: Negative for abdominal pain, constipation, diarrhea, heartburn, nausea and vomiting. Genitourinary: Negative for dysuria, frequency and urgency. Musculoskeletal: Negative for back pain, joint pain, myalgias and neck pain. Occasional calf cramping of left leg. Always at rest   Skin: Negative for rash. Neurological: Negative. Negative for headaches. Endo/Heme/Allergies: Does not bruise/bleed easily. Psychiatric/Behavioral: Negative for depression, memory loss and suicidal ideas. The patient is nervous/anxious. Visit Vitals  /80 (BP 1 Location: Left arm, BP Patient Position: Sitting)   Pulse 70   Temp 98 °F (36.7 °C) (Oral)   Resp 16   Ht 5' 4\" (1.626 m)   Wt 166 lb (75.3 kg)   SpO2 99%   BMI 28.49 kg/m²       Physical Exam      Current Outpatient Medications   Medication Sig    azithromycin (ZITHROMAX Z-SHYAM) 250 mg tablet Take 250 mg by mouth daily.  vitamin e (E GEMS) 100 unit capsule Take  by mouth daily.  b complex vitamins tablet Take 1 Tab by mouth daily.  simvastatin (ZOCOR) 20 mg tablet TAKE 1 TABLET BY MOUTH EVERY DAY AT NIGHT    butalbital-acetaminophen-caff (FIORICET) -40 mg per capsule TAKE 1 CAPSULE BY MOUTH EVERY 6 HOURS AS NEEDED FOR PAIN    ALPRAZolam (XANAX) 0.5 mg tablet TAKE 1 TABLET BY MOUTH TWICE A DAY AS NEEDED FOR ANXIETY    fluticasone (FLONASE) 50 mcg/actuation nasal spray 2 Sprays by Both Nostrils route daily.  cholecalciferol, VITAMIN D3, (VITAMIN D3) 5,000 unit tab tablet Take 1 Tab by mouth two (2) times a day.     fexofenadine-pseudoephedrine (ALLEGRA-D 24 HOUR) 180-240 mg per tablet Take 1 Tab by mouth daily.  raNITIdine (ZANTAC) 150 mg tablet Take 1 Tab by mouth nightly.  OTHER Juice plus    OTHER Stool softener     No current facility-administered medications for this visit. Past Medical History:   Diagnosis Date    Anxiety     Gluten intolerance     Sinus problem       Past Surgical History:   Procedure Laterality Date    HX SHOULDER ARTHROSCOPY      REPR VAGINAL PROLAPSE,UTEROSACRAL        Social History     Tobacco Use    Smoking status: Never Smoker    Smokeless tobacco: Never Used   Substance Use Topics    Alcohol use: Yes     Comment: wine      Family History   Problem Relation Age of Onset    Diabetes Mother     Hypertension Mother     Celiac Disease Father     Celiac Disease Brother     Diabetes Maternal Grandmother         Allergies   Allergen Reactions    Aspirin Not Reported This Time    Bupropion Nausea and Vomiting    Floxin [Ofloxacin] Rash    Ibuprofen Swelling    Maxalt [Rizatriptan] Palpitations    Prevacid [Lansoprazole] Rash    Wellbutrin [Bupropion Hcl] Rash        Assessment/Plan  Diagnoses and all orders for this visit:    1. Medicare annual wellness visit, subsequent -health maintenance is up-to-date. We did discuss continuing mammograms. Johnny Glover was counseled on age-appropriate/ guideline-based risk prevention behaviors and screening for a 75y.o. year old   female . We also discussed adjustments in screening based on family history if necessary. Printed instructions for preventative screening guidelines and healthy behaviors given to patient with after visit summary. 2. Advanced directives, counseling/discussion    3. Screening for depression  -     DEPRESSION SCREEN ANNUAL    4. Anxiety -only uses PRN  -     ALPRAZolam (XANAX) 0.5 mg tablet; TAKE 1 TABLET BY MOUTH TWICE A DAY AS NEEDED FOR ANXIETY    5.  Hyperlipidemia, unspecified hyperlipidemia type -stable  -     METABOLIC PANEL, COMPREHENSIVE  -     LIPID PANEL  -     CBC WITH AUTOMATED DIFF    6. Vitamin D deficiency  -     VITAMIN D, 25 HYDROXY    7. Postmenopausal -we will get a bone density scan. -     DEXA BONE DENSITY STUDY AXIAL; Future            Niles Dewitt MD  10/3/2019    This note was created with the help of speech recognition software Lori Hawley) and may contain some 'sound alike' errors. This is the Subsequent Medicare Annual Wellness Exam, performed 12 months or more after the Initial AWV or the last Subsequent AWV    I have reviewed the patient's medical history in detail and updated the computerized patient record. History     Past Medical History:   Diagnosis Date    Anxiety     Gluten intolerance     Sinus problem       Past Surgical History:   Procedure Laterality Date    HX SHOULDER ARTHROSCOPY      REPR VAGINAL PROLAPSE,UTEROSACRAL       Current Outpatient Medications   Medication Sig Dispense Refill    azithromycin (ZITHROMAX Z-SHYAM) 250 mg tablet Take 250 mg by mouth daily.  vitamin e (E GEMS) 100 unit capsule Take  by mouth daily.  b complex vitamins tablet Take 1 Tab by mouth daily.  simvastatin (ZOCOR) 20 mg tablet TAKE 1 TABLET BY MOUTH EVERY DAY AT NIGHT 90 Tab 1    butalbital-acetaminophen-caff (FIORICET) -40 mg per capsule TAKE 1 CAPSULE BY MOUTH EVERY 6 HOURS AS NEEDED FOR PAIN 20 Cap 1    ALPRAZolam (XANAX) 0.5 mg tablet TAKE 1 TABLET BY MOUTH TWICE A DAY AS NEEDED FOR ANXIETY 30 Tab 1    fluticasone (FLONASE) 50 mcg/actuation nasal spray 2 Sprays by Both Nostrils route daily. 3 Bottle 3    cholecalciferol, VITAMIN D3, (VITAMIN D3) 5,000 unit tab tablet Take 1 Tab by mouth two (2) times a day. 180 Tab 1    fexofenadine-pseudoephedrine (ALLEGRA-D 24 HOUR) 180-240 mg per tablet Take 1 Tab by mouth daily.  raNITIdine (ZANTAC) 150 mg tablet Take 1 Tab by mouth nightly.  90 Tab 3    OTHER Juice plus      OTHER Stool softener       Allergies   Allergen Reactions    Aspirin Not Reported This Time    Bupropion Nausea and Vomiting    Floxin [Ofloxacin] Rash    Ibuprofen Swelling    Maxalt [Rizatriptan] Palpitations    Prevacid [Lansoprazole] Rash    Wellbutrin [Bupropion Hcl] Rash     Family History   Problem Relation Age of Onset    Diabetes Mother     Hypertension Mother     Celiac Disease Father     Celiac Disease Brother     Diabetes Maternal Grandmother      Social History     Tobacco Use    Smoking status: Never Smoker    Smokeless tobacco: Never Used   Substance Use Topics    Alcohol use: Yes     Comment: wine     Patient Active Problem List   Diagnosis Code    Hyperlipidemia E78.5    IFG (impaired fasting glucose) R73.01    Gastroesophageal reflux disease K21.9    Allergic rhinitis J30.9    Anxiety F41.9       Depression Risk Factor Screening:     3 most recent PHQ Screens 10/3/2019   Little interest or pleasure in doing things Not at all   Feeling down, depressed, irritable, or hopeless Not at all   Total Score PHQ 2 0   Trouble falling or staying asleep, or sleeping too much -   Feeling tired or having little energy -   Poor appetite, weight loss, or overeating -   Feeling bad about yourself - or that you are a failure or have let yourself or your family down -   Trouble concentrating on things such as school, work, reading, or watching TV -   Moving or speaking so slowly that other people could have noticed; or the opposite being so fidgety that others notice -   Thoughts of being better off dead, or hurting yourself in some way -   PHQ 9 Score -   How difficult have these problems made it for you to do your work, take care of your home and get along with others -     Alcohol Risk Factor Screening: You do not drink alcohol or very rarely. Functional Ability and Level of Safety:   Hearing Loss  Hearing is good. Activities of Daily Living  The home contains: no safety equipment.   Patient does total self care    Fall Risk  Fall Risk Assessment, last 12 mths 10/3/2019   Able to walk? Yes   Fall in past 12 months? No       Abuse Screen  Patient is not abused    Cognitive Screening   Evaluation of Cognitive Function:  Has your family/caregiver stated any concerns about your memory: no  Normal    Patient Care Team   Patient Care Team:  Jinny eKndall MD as PCP - General (Internal Medicine)    Assessment/Plan   Education and counseling provided:  Are appropriate based on today's review and evaluation  End-of-Life planning (with patient's consent)  Pneumococcal Vaccine  Bone mass measurement (DEXA)    Diagnoses and all orders for this visit:    1. Medicare annual wellness visit, subsequent    2. Advanced directives, counseling/discussion    3. Screening for depression  -     DEPRESSION SCREEN ANNUAL    4. Anxiety    5. Hyperlipidemia, unspecified hyperlipidemia type    6.  Vitamin D deficiency        Health Maintenance Due   Topic Date Due    DTaP/Tdap/Td series (2 - Td) 10/01/2016    GLAUCOMA SCREENING Q2Y  04/05/2018    Bone Densitometry (Dexa) Screening  04/05/2018    BREAST CANCER SCRN MAMMOGRAM  08/31/2018    Influenza Age 9 to Adult  08/01/2019

## 2019-10-03 NOTE — PROGRESS NOTES
Eye exams: Elpidio Ariza  eye [place], fax [phone]    Dexa: Pt last bone density was about 5/6 years ago. Mammogram: Pt last image was 8/13/16, and states she was told she doesn't need anymore mammograms. Pneumonia vaccines: Pt has had Prevnar 13 10/3/18 (age 70), had pneu 23 done about 2 weeks ago with [facility]. Flu vaccine: Pt is not ready for the flu vaccine yet.

## 2019-10-03 NOTE — PATIENT INSTRUCTIONS
Medicare Wellness Visit, Female     The best way to live healthy is to have a lifestyle where you eat a well-balanced diet, exercise regularly, limit alcohol use, and quit all forms of tobacco/nicotine, if applicable. Regular preventive services are another way to keep healthy. Preventive services (vaccines, screening tests, monitoring & exams) can help personalize your care plan, which helps you manage your own care. Screening tests can find health problems at the earliest stages, when they are easiest to treat. [facility] follows the current, evidence-based guidelines published by the [facility] Ma[facility] Jimenez ([facility]) when recommending preventive services for our patients. Because we follow these guidelines, sometimes recommendations change over time as research supports it. (For example, mammograms used to be recommended annually. Even though Medicare will still pay for an annual mammogram, the newer guidelines recommend a mammogram every two years for women of average risk.)  Of course, you and your doctor may decide to screen more often for some diseases, based on your risk and your health status. Preventive services for you include:  - Medicare offers their members a free annual wellness visit, which is time for you and your primary care provider to discuss and plan for your preventive service needs. Take advantage of this benefit every year!  -All adults over the age of 72 should receive the recommended pneumonia vaccines. Current USPSTF guidelines recommend a series of two vaccines for the best pneumonia protection.   -All adults should have a flu vaccine yearly and a tetanus vaccine every 10 years. All adults age 61 and older should receive a shingles vaccine once in their lifetime.    -A bone mass density test is recommended when a woman turns 65 to screen for osteoporosis. This test is only recommended one time, as a screening.  Some providers will use this same test as a disease monitoring tool if you already have osteoporosis. -All adults age 38-68 who are overweight should have a diabetes screening test once every three years.   -Other screening tests and preventive services for persons with diabetes include: an eye exam to screen for diabetic retinopathy, a kidney function test, a foot exam, and stricter control over your cholesterol.   -Cardiovascular screening for adults with routine risk involves an electrocardiogram (ECG) at intervals determined by your doctor.   -Colorectal cancer screenings should be done for adults age 54-65 with no increased risk factors for colorectal cancer. There are a number of acceptable methods of screening for this type of cancer. Each test has its own benefits and drawbacks. Discuss with your doctor what is most appropriate for you during your annual wellness visit. The different tests include: colonoscopy (considered the best screening method), a fecal occult blood test, a fecal DNA test, and sigmoidoscopy. -Breast cancer screenings are recommended every other year for women of normal risk, age 54-69.  -Cervical cancer screenings for women over age 72 are only recommended with certain risk factors.   -All adults born between [facility] should be screened once for Hepatitis C.      Here is a list of your current Health Maintenance items (your personalized list of preventive services) with a due date:  Health Maintenance Due   Topic Date Due    DTaP/Tdap/Td  (2 - Td) 10/01/2016    Glaucoma Screening   04/05/2018    Bone Mineral Density   04/05/2018    Mammogram  08/31/2018    Flu Vaccine  08/01/2019    Pneumococcal Vaccine (2 of 2 - PPSV23) 10/03/2019

## 2019-10-04 LAB
25(OH)D3+25(OH)D2 SERPL-MCNC: 53.3 NG/ML (ref 30–100)
ALBUMIN SERPL-MCNC: 4.9 G/DL (ref 3.6–4.8)
ALBUMIN/GLOB SERPL: 1.9 {RATIO} (ref 1.2–2.2)
ALP SERPL-CCNC: 91 IU/L (ref 39–117)
ALT SERPL-CCNC: 27 IU/L (ref 0–32)
AST SERPL-CCNC: 38 IU/L (ref 0–40)
BASOPHILS # BLD AUTO: 0 X10E3/UL (ref 0–0.2)
BASOPHILS NFR BLD AUTO: 1 %
BILIRUB SERPL-MCNC: 0.6 MG/DL (ref 0–1.2)
BUN SERPL-MCNC: 9 MG/DL (ref 8–27)
BUN/CREAT SERPL: 9 (ref 12–28)
CALCIUM SERPL-MCNC: 9.8 MG/DL (ref 8.7–10.3)
CHLORIDE SERPL-SCNC: 104 MMOL/L (ref 96–106)
CHOLEST SERPL-MCNC: 174 MG/DL (ref 100–199)
CO2 SERPL-SCNC: 25 MMOL/L (ref 20–29)
CREAT SERPL-MCNC: 1 MG/DL (ref 0.57–1)
EOSINOPHIL # BLD AUTO: 0 X10E3/UL (ref 0–0.4)
EOSINOPHIL NFR BLD AUTO: 1 %
ERYTHROCYTE [DISTWIDTH] IN BLOOD BY AUTOMATED COUNT: 13.3 % (ref 12.3–15.4)
GLOBULIN SER CALC-MCNC: 2.6 G/DL (ref 1.5–4.5)
GLUCOSE SERPL-MCNC: 97 MG/DL (ref 65–99)
HCT VFR BLD AUTO: 45.6 % (ref 34–46.6)
HDLC SERPL-MCNC: 59 MG/DL
HGB BLD-MCNC: 15.3 G/DL (ref 11.1–15.9)
IMM GRANULOCYTES # BLD AUTO: 0 X10E3/UL (ref 0–0.1)
IMM GRANULOCYTES NFR BLD AUTO: 0 %
INTERPRETATION, 910389: NORMAL
INTERPRETATION: NORMAL
LDLC SERPL CALC-MCNC: 88 MG/DL (ref 0–99)
LYMPHOCYTES # BLD AUTO: 1.6 X10E3/UL (ref 0.7–3.1)
LYMPHOCYTES NFR BLD AUTO: 26 %
MCH RBC QN AUTO: 29.4 PG (ref 26.6–33)
MCHC RBC AUTO-ENTMCNC: 33.6 G/DL (ref 31.5–35.7)
MCV RBC AUTO: 88 FL (ref 79–97)
MONOCYTES # BLD AUTO: 0.4 X10E3/UL (ref 0.1–0.9)
MONOCYTES NFR BLD AUTO: 7 %
NEUTROPHILS # BLD AUTO: 4 X10E3/UL (ref 1.4–7)
NEUTROPHILS NFR BLD AUTO: 65 %
PDF IMAGE, 910387: NORMAL
PLATELET # BLD AUTO: 296 X10E3/UL (ref 150–450)
POTASSIUM SERPL-SCNC: 4.6 MMOL/L (ref 3.5–5.2)
PROT SERPL-MCNC: 7.5 G/DL (ref 6–8.5)
RBC # BLD AUTO: 5.21 X10E6/UL (ref 3.77–5.28)
SODIUM SERPL-SCNC: 144 MMOL/L (ref 134–144)
TRIGL SERPL-MCNC: 134 MG/DL (ref 0–149)
VLDLC SERPL CALC-MCNC: 27 MG/DL (ref 5–40)
WBC # BLD AUTO: 6.1 X10E3/UL (ref 3.4–10.8)

## 2019-11-27 DIAGNOSIS — G43.809 OTHER MIGRAINE WITHOUT STATUS MIGRAINOSUS, NOT INTRACTABLE: ICD-10-CM

## 2019-11-27 RX ORDER — BUTALBITAL, ACETAMINOPHEN AND CAFFEINE 300; 40; 50 MG/1; MG/1; MG/1
CAPSULE ORAL
Qty: 20 CAP | Refills: 1 | Status: SHIPPED | OUTPATIENT
Start: 2019-11-27 | End: 2020-01-27

## 2019-12-18 DIAGNOSIS — K21.9 GASTROESOPHAGEAL REFLUX DISEASE, ESOPHAGITIS PRESENCE NOT SPECIFIED: ICD-10-CM

## 2019-12-18 RX ORDER — RANITIDINE 150 MG/1
150 TABLET, FILM COATED ORAL
Qty: 90 TAB | Refills: 3 | Status: SHIPPED | OUTPATIENT
Start: 2019-12-18 | End: 2020-04-07

## 2020-01-27 DIAGNOSIS — G43.809 OTHER MIGRAINE WITHOUT STATUS MIGRAINOSUS, NOT INTRACTABLE: ICD-10-CM

## 2020-01-27 RX ORDER — BUTALBITAL, ACETAMINOPHEN AND CAFFEINE 300; 40; 50 MG/1; MG/1; MG/1
CAPSULE ORAL
Qty: 20 CAP | Refills: 1 | Status: SHIPPED | OUTPATIENT
Start: 2020-01-27 | End: 2020-03-26

## 2020-02-06 ENCOUNTER — HOSPITAL ENCOUNTER (OUTPATIENT)
Dept: MAMMOGRAPHY | Age: 65
Discharge: HOME | End: 2020-02-06
Attending: INTERNAL MEDICINE
Payer: MEDICARE

## 2020-02-06 DIAGNOSIS — Z78.0 POSTMENOPAUSAL: ICD-10-CM

## 2020-02-06 DIAGNOSIS — Z12.31 VISIT FOR SCREENING MAMMOGRAM: ICD-10-CM

## 2020-02-06 PROCEDURE — 77067 SCR MAMMO BI INCL CAD: CPT

## 2020-02-06 PROCEDURE — 77080 DXA BONE DENSITY AXIAL: CPT

## 2020-02-26 DIAGNOSIS — E78.5 HYPERLIPIDEMIA, UNSPECIFIED HYPERLIPIDEMIA TYPE: ICD-10-CM

## 2020-02-26 RX ORDER — SIMVASTATIN 20 MG/1
TABLET, FILM COATED ORAL
Qty: 90 TAB | Refills: 1 | Status: SHIPPED | OUTPATIENT
Start: 2020-02-26 | End: 2021-02-24 | Stop reason: ALTCHOICE

## 2020-03-10 DIAGNOSIS — F41.9 ANXIETY: ICD-10-CM

## 2020-03-10 RX ORDER — ALPRAZOLAM 0.5 MG/1
TABLET ORAL
Qty: 30 TAB | Refills: 0 | Status: SHIPPED | OUTPATIENT
Start: 2020-03-10 | End: 2020-04-14

## 2020-03-25 DIAGNOSIS — G43.809 OTHER MIGRAINE WITHOUT STATUS MIGRAINOSUS, NOT INTRACTABLE: ICD-10-CM

## 2020-03-26 RX ORDER — BUTALBITAL, ACETAMINOPHEN AND CAFFEINE 300; 40; 50 MG/1; MG/1; MG/1
CAPSULE ORAL
Qty: 20 CAP | Refills: 1 | Status: SHIPPED | OUTPATIENT
Start: 2020-03-26 | End: 2020-04-28

## 2020-04-07 ENCOUNTER — VIRTUAL VISIT (OUTPATIENT)
Dept: INTERNAL MEDICINE CLINIC | Age: 65
End: 2020-04-07

## 2020-04-07 ENCOUNTER — TELEPHONE (OUTPATIENT)
Dept: INTERNAL MEDICINE CLINIC | Age: 65
End: 2020-04-07

## 2020-04-07 VITALS
DIASTOLIC BLOOD PRESSURE: 82 MMHG | HEART RATE: 64 BPM | BODY MASS INDEX: 27.83 KG/M2 | HEIGHT: 64 IN | WEIGHT: 163 LBS | SYSTOLIC BLOOD PRESSURE: 145 MMHG | TEMPERATURE: 98.2 F

## 2020-04-07 DIAGNOSIS — J32.9 SINUSITIS, UNSPECIFIED CHRONICITY, UNSPECIFIED LOCATION: Primary | ICD-10-CM

## 2020-04-07 DIAGNOSIS — R03.0 ELEVATED BP WITHOUT DIAGNOSIS OF HYPERTENSION: ICD-10-CM

## 2020-04-07 RX ORDER — FAMOTIDINE 20 MG/1
20 TABLET, FILM COATED ORAL
Qty: 90 TAB | Refills: 1 | Status: SHIPPED | OUTPATIENT
Start: 2020-04-07 | End: 2020-05-13 | Stop reason: SDUPTHER

## 2020-04-07 RX ORDER — AMOXICILLIN AND CLAVULANATE POTASSIUM 875; 125 MG/1; MG/1
1 TABLET, FILM COATED ORAL EVERY 12 HOURS
Qty: 20 TAB | Refills: 0 | Status: SHIPPED | OUTPATIENT
Start: 2020-04-07 | End: 2020-04-17

## 2020-04-07 NOTE — TELEPHONE ENCOUNTER
Received request from [facility] for an alternative Rx for ranitidine as it has been removed from the market. Pt last seen on 10/3/20 for [address]

## 2020-04-07 NOTE — PROGRESS NOTES
Kelvin Zapata was seen on 4/7/2020 using synchronous (real-time) audio-video technology; doxy. me. Consent:  Patient and/or healthcare decision maker is aware that this patient-initiated Telehealth encounter is a billable service, with coverage as determined by their insurance carrier. Patient is aware that they may receive a bill and has provided verbal consent to proceed: Yes     I was in the office while conducting this encounter. Kelvin Zapata is a 77 y.o. female who presents today for Pressure Behind the Eyes and Nasal Congestion  . She has a history of   Patient Active Problem List   Diagnosis Code    Hyperlipidemia E78.5    IFG (impaired fasting glucose) R73.01    Gastroesophageal reflux disease K21.9    Allergic rhinitis J30.9    Anxiety F41.9   . Today patient is being seen for an acute visit. Upper respiratory illness:  Kelvin Zapata presents with complaints of congestion, post nasal drip and hoarseness for 2 weeks. no nausea and no vomiting . she has not had  dry cough, productive cough, fever and chills. Symptoms are moderate. Over-the-counter remedies including   Drinking plenty of fluids: yes  Asthma?:  no  non-smoker      ROS  Review of Systems   Constitutional: Positive for malaise/fatigue. Negative for chills, fever and weight loss. HENT: Positive for congestion, sinus pain and sore throat. Negative for ear discharge, ear pain, hearing loss and tinnitus. Eyes: Negative for blurred vision, double vision and photophobia. Respiratory: Negative for cough, hemoptysis, sputum production, shortness of breath, wheezing and stridor. Cardiovascular: Negative for chest pain, palpitations, orthopnea, claudication and leg swelling. Gastrointestinal: Negative for abdominal pain, constipation, diarrhea, heartburn, nausea and vomiting. Genitourinary: Negative for dysuria, frequency and urgency. Musculoskeletal: Negative for joint pain and myalgias.    Skin: Negative for rash.   Neurological: Negative. Negative for headaches. Endo/Heme/Allergies: Does not bruise/bleed easily. Psychiatric/Behavioral: Negative for memory loss and suicidal ideas. Visit Vitals  /82 (BP 1 Location: Left arm, BP Patient Position: Sitting)   Pulse 64   Temp 98.2 °F (36.8 °C) (Oral)   Ht 5' 4\" (1.626 m)   Wt 163 lb (73.9 kg)   BMI 27.98 kg/m²       Physical Exam  Constitutional:       Appearance: Normal appearance. Pulmonary:      Effort: Pulmonary effort is normal. No respiratory distress. Neurological:      Mental Status: She is alert. Current Outpatient Medications   Medication Sig    famotidine (PEPCID) 20 mg tablet Take 1 Tab by mouth nightly.  butalbital-acetaminophen-caff (FIORICET) -40 mg per capsule TAKE 1 CAPSULE BY MOUTH EVERY 6 HOURS AS NEEDED FOR PAIN    ALPRAZolam (XANAX) 0.5 mg tablet TAKE 1 TABLET BY MOUTH TWICE A DAY AS NEEDED FOR ANXIETY    simvastatin (ZOCOR) 20 mg tablet TAKE 1 TABLET BY MOUTH EVERY DAY AT NIGHT    vitamin e (E GEMS) 100 unit capsule Take  by mouth daily.  fluticasone (FLONASE) 50 mcg/actuation nasal spray 2 Sprays by Both Nostrils route daily.  cholecalciferol, VITAMIN D3, (VITAMIN D3) 5,000 unit tab tablet Take 1 Tab by mouth two (2) times a day.  fexofenadine-pseudoephedrine (ALLEGRA-D 24 HOUR) 180-240 mg per tablet Take 1 Tab by mouth daily.  OTHER Juice plus    OTHER Stool softener    azithromycin (ZITHROMAX Z-SHYAM) 250 mg tablet Take 250 mg by mouth daily.  b complex vitamins tablet Take 1 Tab by mouth daily. No current facility-administered medications for this visit.          Past Medical History:   Diagnosis Date    Anxiety     Gluten intolerance     Sinus problem       Past Surgical History:   Procedure Laterality Date    HX SHOULDER ARTHROSCOPY      REPR VAGINAL PROLAPSE,UTEROSACRAL        Social History     Tobacco Use    Smoking status: Never Smoker    Smokeless tobacco: Never Used Substance Use Topics    Alcohol use: Yes     Comment: wine      Family History   Problem Relation Age of Onset    Diabetes Mother     Hypertension Mother     Celiac Disease Father     Celiac Disease Brother     Diabetes Maternal Grandmother         Allergies   Allergen Reactions    Aspirin Not Reported This Time    Bupropion Nausea and Vomiting    Floxin [Ofloxacin] Rash    Ibuprofen Swelling    Maxalt [Rizatriptan] Palpitations    Prevacid [Lansoprazole] Rash    Wellbutrin [Bupropion Hcl] Rash        Assessment/Plan  Diagnoses and all orders for this visit:    1. Sinusitis, unspecified chronicity, unspecified location-in duration of symptoms will place on antibiotic. Patient does have a history of these in the spring. She normally takes antihistamines as needed. We discussed taking Allegra daily into June to see if we can prevent some sinus infections. Patient to take Mucinex for coming several days. -     amoxicillin-clavulanate (AUGMENTIN) 875-125 mg per tablet; Take 1 Tab by mouth every twelve (12) hours for 10 days. 2. Elevated BP without diagnosis of hypertension-normally stable at home. Patient is taking decongestants. Young Lujna is a 77 y.o. female being evaluated by a video visit encounter for concerns as above. A caregiver was present when appropriate. Due to this being a TeleHealth encounter (During [facility]- public health emergency), evaluation of the following organ systems was limited: Vitals/Constitutional/EENT/Resp/CV/GI//MS/Neuro/Skin/Heme-Lymph-Imm. Pursuant to the emergency declaration under the [facility], 1135 waiver authority and the [employer] and [employer] Act, this Virtual  Visit was conducted, with patient's (and/or legal guardian's) consent, to reduce the patient's risk of exposure to COVID-19 and provide necessary medical care.      Services were provided through a video synchronous discussion virtually to substitute for in-person clinic visit. Patient and provider were located at their individual homes. Som Quintanilla MD  4/7/2020    This note was created with the help of speech recognition software Lion Arreguin) and may contain some 'sound alike' errors.

## 2020-04-08 ENCOUNTER — TELEPHONE (OUTPATIENT)
Dept: INTERNAL MEDICINE CLINIC | Age: 65
End: 2020-04-08

## 2020-04-08 NOTE — TELEPHONE ENCOUNTER
Pt is requesting nasal yonathan phoned into the pharmacy    Per pt she spoke to the nurse and thought she had some at home.      I verified her phamacy    Thank you

## 2020-04-09 RX ORDER — FLUTICASONE PROPIONATE 50 MCG
2 SPRAY, SUSPENSION (ML) NASAL DAILY
Qty: 3 BOTTLE | Refills: 3 | Status: SHIPPED | OUTPATIENT
Start: 2020-04-09 | End: 2020-05-13 | Stop reason: SDUPTHER

## 2020-04-14 DIAGNOSIS — F41.9 ANXIETY: ICD-10-CM

## 2020-04-14 RX ORDER — ALPRAZOLAM 0.5 MG/1
TABLET ORAL
Qty: 30 TAB | Refills: 0 | Status: SHIPPED | OUTPATIENT
Start: 2020-04-14 | End: 2020-05-13 | Stop reason: SDUPTHER

## 2020-04-28 DIAGNOSIS — G43.809 OTHER MIGRAINE WITHOUT STATUS MIGRAINOSUS, NOT INTRACTABLE: ICD-10-CM

## 2020-04-28 RX ORDER — BUTALBITAL, ACETAMINOPHEN AND CAFFEINE 300; 40; 50 MG/1; MG/1; MG/1
CAPSULE ORAL
Qty: 20 CAP | Refills: 1 | Status: SHIPPED | OUTPATIENT
Start: 2020-04-28 | End: 2020-06-29

## 2020-04-30 ENCOUNTER — VIRTUAL VISIT (OUTPATIENT)
Dept: INTERNAL MEDICINE CLINIC | Age: 65
End: 2020-04-30

## 2020-04-30 VITALS
BODY MASS INDEX: 27.14 KG/M2 | TEMPERATURE: 98.2 F | SYSTOLIC BLOOD PRESSURE: 108 MMHG | HEART RATE: 76 BPM | WEIGHT: 159 LBS | DIASTOLIC BLOOD PRESSURE: 66 MMHG | HEIGHT: 64 IN

## 2020-04-30 DIAGNOSIS — J32.9 SINUSITIS, UNSPECIFIED CHRONICITY, UNSPECIFIED LOCATION: Primary | ICD-10-CM

## 2020-04-30 RX ORDER — CEFDINIR 300 MG/1
300 CAPSULE ORAL 2 TIMES DAILY
Qty: 20 CAP | Refills: 0 | Status: SHIPPED | OUTPATIENT
Start: 2020-04-30 | End: 2020-05-10

## 2020-04-30 RX ORDER — PREDNISONE 20 MG/1
TABLET ORAL
Qty: 9 TAB | Refills: 0 | Status: SHIPPED | OUTPATIENT
Start: 2020-04-30 | End: 2020-10-14 | Stop reason: ALTCHOICE

## 2020-04-30 NOTE — PROGRESS NOTES
Chetna Napier was seen on 4/30/2020 using synchronous (real-time) audio-video technology; doxy. me. Consent: Chetna Napier, who was seen by synchronous (real-time) audio-video technology, and/or her healthcare decision maker, is aware that this patient-initiated, Telehealth encounter on 4/30/2020 is a billable service, with coverage as determined by her insurance carrier. She is aware that she may receive a bill and has provided verbal consent to proceed: Yes. I was in the office while conducting this encounter. Chetna Napier is a 77 y.o. female who presents today for Sinus Pain; Nasal Congestion; and Ear Fullness  . She has a history of   Patient Active Problem List   Diagnosis Code    Hyperlipidemia E78.5    IFG (impaired fasting glucose) R73.01    Gastroesophageal reflux disease K21.9    Allergic rhinitis J30.9    Anxiety F41.9   . Today patient is being seen for an acute visit. she does not have other concerns. Upper respiratory illness:  Chetna Napier presents with complaints of congestion, sore throat, dry cough, headache, bilateral sinus pain and hoarseness for 5 days. no nausea and no vomiting . she has not had  fever and chills. Symptoms are moderate. Over-the-counter remedies including Flonase, allegra and   Drinking plenty of fluids: yes  Asthma?:  Yes RAD  non-smoker  Contacts with similar infections: no       ROS  Review of Systems   Constitutional: Positive for malaise/fatigue. Negative for chills, fever and weight loss. HENT: Positive for congestion, sinus pain and sore throat. Negative for ear discharge, ear pain, hearing loss and tinnitus. Respiratory: Positive for cough. Negative for hemoptysis, sputum production, shortness of breath, wheezing and stridor. Cardiovascular: Negative for chest pain, palpitations and leg swelling. Gastrointestinal: Negative for abdominal pain, nausea and vomiting.    Genitourinary: Negative for dysuria, frequency, hematuria and urgency. Neurological: Negative. Endo/Heme/Allergies: Does not bruise/bleed easily. Psychiatric/Behavioral: Negative for depression. The patient is not nervous/anxious. Visit Vitals  /66 (BP 1 Location: Left arm, BP Patient Position: Sitting)   Pulse 76   Temp 98.2 °F (36.8 °C) (Oral)   Ht 5' 4\" (1.626 m)   Wt 159 lb (72.1 kg)   BMI 27.29 kg/m²       Physical Exam  Constitutional:       Appearance: Normal appearance. HENT:      Head: Normocephalic and atraumatic. Pulmonary:      Effort: Pulmonary effort is normal.      Comments: Breathing comfortably    Neurological:      General: No focal deficit present. Mental Status: She is alert. Psychiatric:         Mood and Affect: Mood normal.         Behavior: Behavior normal.           Current Outpatient Medications   Medication Sig    cefdinir (OMNICEF) 300 mg capsule Take 1 Cap by mouth two (2) times a day for 10 days.  predniSONE (DELTASONE) 20 mg tablet Take two tablets for 3 days then one for 3 days.  butalbital-acetaminophen-caff (FIORICET) -40 mg per capsule TAKE 1 CAPSULE BY MOUTH EVERY 6 HOURS AS NEEDED FOR PAIN    ALPRAZolam (XANAX) 0.5 mg tablet TAKE 1 TABLET BY MOUTH TWICE A DAY AS NEEDED FOR ANXIETY    fluticasone propionate (Flonase) 50 mcg/actuation nasal spray 2 Sprays by Both Nostrils route daily.  famotidine (PEPCID) 20 mg tablet Take 1 Tab by mouth nightly.  simvastatin (ZOCOR) 20 mg tablet TAKE 1 TABLET BY MOUTH EVERY DAY AT NIGHT    vitamin e (E GEMS) 100 unit capsule Take  by mouth daily.  cholecalciferol, VITAMIN D3, (VITAMIN D3) 5,000 unit tab tablet Take 1 Tab by mouth two (2) times a day.  fexofenadine-pseudoephedrine (ALLEGRA-D 24 HOUR) 180-240 mg per tablet Take 1 Tab by mouth daily.  OTHER Juice plus    OTHER Stool softener     No current facility-administered medications for this visit.          Past Medical History:   Diagnosis Date    Anxiety     Gluten intolerance  Sinus problem       Past Surgical History:   Procedure Laterality Date    HX SHOULDER ARTHROSCOPY      REPR VAGINAL PROLAPSE,UTEROSACRAL        Social History     Tobacco Use    Smoking status: Never Smoker    Smokeless tobacco: Never Used   Substance Use Topics    Alcohol use: Yes     Comment: wine      Family History   Problem Relation Age of Onset    Diabetes Mother     Hypertension Mother     Celiac Disease Father     Celiac Disease Brother     Diabetes Maternal Grandmother         Allergies   Allergen Reactions    Aspirin Not Reported This Time    Bupropion Nausea and Vomiting    Floxin [Ofloxacin] Rash    Ibuprofen Swelling    Maxalt [Rizatriptan] Palpitations    Prevacid [Lansoprazole] Rash    Wellbutrin [Bupropion Hcl] Rash        Assessment/Plan  Diagnoses and all orders for this visit:    1. Sinusitis, unspecified chronicity, unspecified location-patient treated for this less than 1 month ago. May have not fully resolved in between. We will place her on 10-day course of Omnicef and if by day 5 still having symptoms we will add a short taper of prednisone. If this persists consider CT of sinuses  -     cefdinir (OMNICEF) 300 mg capsule; Take 1 Cap by mouth two (2) times a day for 10 days. -     predniSONE (DELTASONE) 20 mg tablet; Take two tablets for 3 days then one for 3 days. Gasper Montoya is a 77 y.o. female being evaluated by a video visit encounter for concerns as above. A caregiver was present when appropriate. Due to this being a TeleHealth encounter (During [facility]- public health emergency), evaluation of the following organ systems was limited: Vitals/Constitutional/EENT/Resp/CV/GI//MS/Neuro/Skin/Heme-Lymph-Imm.   Pursuant to the emergency declaration under the [facility], 1135 waiver authority and the [employer] and [employer] Act, this Virtual  Visit was conducted, with patient's (and/or legal guardian's) consent, to reduce the patient's risk of exposure to COVID-19 and provide necessary medical care. Services were provided through a video synchronous discussion virtually to substitute for in-person clinic visit. Patient and provider were located at their individual homes. Maria Esther Castro MD  4/30/2020    This note was created with the help of speech recognition software Roderick Murray) and may contain some 'sound alike' errors.

## 2020-05-13 DIAGNOSIS — F41.9 ANXIETY: ICD-10-CM

## 2020-05-13 RX ORDER — ALPRAZOLAM 0.5 MG/1
TABLET ORAL
Qty: 30 TAB | Refills: 1 | Status: SHIPPED | OUTPATIENT
Start: 2020-05-13 | End: 2020-06-24

## 2020-05-13 RX ORDER — FLUTICASONE PROPIONATE 50 MCG
2 SPRAY, SUSPENSION (ML) NASAL DAILY
Qty: 3 BOTTLE | Refills: 3 | Status: SHIPPED | OUTPATIENT
Start: 2020-05-13 | End: 2021-11-24 | Stop reason: SDUPTHER

## 2020-05-13 RX ORDER — FAMOTIDINE 20 MG/1
20 TABLET, FILM COATED ORAL
Qty: 90 TAB | Refills: 1 | Status: SHIPPED | OUTPATIENT
Start: 2020-05-13

## 2020-06-24 DIAGNOSIS — F41.9 ANXIETY: ICD-10-CM

## 2020-06-24 RX ORDER — ALPRAZOLAM 0.5 MG/1
TABLET ORAL
Qty: 30 TAB | Refills: 0 | Status: SHIPPED | OUTPATIENT
Start: 2020-06-24 | End: 2020-07-24

## 2020-06-29 DIAGNOSIS — G43.809 OTHER MIGRAINE WITHOUT STATUS MIGRAINOSUS, NOT INTRACTABLE: ICD-10-CM

## 2020-06-29 RX ORDER — BUTALBITAL, ACETAMINOPHEN AND CAFFEINE 300; 40; 50 MG/1; MG/1; MG/1
CAPSULE ORAL
Qty: 20 CAP | Refills: 1 | Status: SHIPPED | OUTPATIENT
Start: 2020-06-29 | End: 2020-08-09

## 2020-07-22 ENCOUNTER — VIRTUAL VISIT (OUTPATIENT)
Dept: INTERNAL MEDICINE CLINIC | Age: 65
End: 2020-07-22

## 2020-07-22 DIAGNOSIS — N30.00 ACUTE CYSTITIS WITHOUT HEMATURIA: Primary | ICD-10-CM

## 2020-07-22 RX ORDER — NITROFURANTOIN 25; 75 MG/1; MG/1
100 CAPSULE ORAL 2 TIMES DAILY
Qty: 10 CAP | Refills: 0 | Status: SHIPPED | OUTPATIENT
Start: 2020-07-22 | End: 2020-07-27

## 2020-07-22 NOTE — PROGRESS NOTES
Consent: Myranda Mosley, who was seen by synchronous (real-time) audio-video technology, and/or her healthcare decision maker, is aware that this patient-initiated, Telehealth encounter on 7/22/2020 is a billable service, with coverage as determined by her insurance carrier. She is aware that she may receive a bill and has provided verbal consent to proceed: Yes. I was in the office while conducting this encounter. This visit was done with doxy. me    Assessment and Plan   Diagnoses and all orders for this visit:    1. Acute cystitis without hematuria  -     nitrofurantoin, macrocrystal-monohydrate, (MACROBID) 100 mg capsule; Take 1 Cap by mouth two (2) times a day for 5 days. Treat empirically based on  history      We discussed the expected course, resolution and complications of the diagnosis(es) in detail. Medication risks, benefits, costs, interactions, and alternatives were discussed as indicated. I advised her to contact the office if her condition worsens, changes or fails to improve as anticipated. She expressed understanding with the diagnosis(es) and plan. Return to clinic:  savannah Riojas MD  [facility]  7/22/2020    No future appointments. Subjective   Chief Complaint   Possible UTI    Myranda Mosley is a 77 y.o. female     Presents to clinic for 4 day history of increased dysuria, frequency, urgency. Reports that she wasn't able to void completely on Sunday but that has since resolved. Denies fever, chills, n/v.  Endorses mild back pain but moderate abdominal pain and anorexia. Feels like previous UTI    Review of Systems   Constitutional: Negative for chills and fever. Respiratory: Negative for shortness of breath. Cardiovascular: Negative for chest pain.             Objective   Vitals:       Patient-Reported Vitals 7/22/2020   Patient-Reported Weight 159lb   Patient-Reported Height 5f4   Patient-Reported Pulse 77   Patient-Reported Temperature 97.7   Patient-Reported Systolic  267   Patient-Reported Diastolic 89                 Physical Exam  Constitutional:       Appearance: Normal appearance. She is not ill-appearing. HENT:      Head: Normocephalic and atraumatic. Right Ear: External ear normal.      Left Ear: External ear normal.      Mouth/Throat:      Mouth: Mucous membranes are moist.   Eyes:      General:         Right eye: No discharge. Left eye: No discharge. Extraocular Movements: Extraocular movements intact. Conjunctiva/sclera: Conjunctivae normal.   Neck:      Musculoskeletal: Normal range of motion. Pulmonary:      Effort: Pulmonary effort is normal. No respiratory distress. Musculoskeletal:      Comments: Able to hold phone without issue   Skin:     Comments: No obvious facial rashes or abnormalities   Neurological:      Mental Status: She is alert and oriented to person, place, and time. Comments: No obvious focal deficit   Psychiatric:         Mood and Affect: Mood normal.         Thought Content: Thought content normal.         Judgment: Judgment normal.          Voice recognition software is utilized and this note may contain transcription errors     Devan Rios is a 77 y.o. female being evaluated by a video visit encounter for concerns as above. A caregiver was present when appropriate. Due to this being a TeleHealth encounter (During [facility]-36 public health emergency), evaluation of the following organ systems was limited: Vitals/Constitutional/EENT/Resp/CV/GI//MS/Neuro/Skin/Heme-Lymph-Imm. Pursuant to the emergency declaration under the [facility], 1135 waiver authority and the [employer] and [employer] Act, this Virtual  Visit was conducted, with patient's (and/or legal guardian's) consent, to reduce the patient's risk of exposure to COVID-19 and provide necessary medical care.      Services were provided through a video synchronous discussion virtually to substitute for in-person clinic visit.

## 2020-07-23 DIAGNOSIS — F41.9 ANXIETY: ICD-10-CM

## 2020-07-24 RX ORDER — ALPRAZOLAM 0.5 MG/1
TABLET ORAL
Qty: 30 TAB | Refills: 0 | Status: SHIPPED | OUTPATIENT
Start: 2020-07-24 | End: 2020-08-29

## 2020-08-04 ENCOUNTER — HOSPITAL ENCOUNTER (OUTPATIENT)
Dept: LAB | Age: 65
Discharge: HOME | End: 2020-08-04

## 2020-08-04 ENCOUNTER — OFFICE VISIT (OUTPATIENT)
Dept: INTERNAL MEDICINE CLINIC | Age: 65
End: 2020-08-04
Payer: MEDICARE

## 2020-08-04 ENCOUNTER — TELEPHONE (OUTPATIENT)
Dept: INTERNAL MEDICINE CLINIC | Age: 65
End: 2020-08-04

## 2020-08-04 VITALS
BODY MASS INDEX: 29.56 KG/M2 | HEART RATE: 68 BPM | DIASTOLIC BLOOD PRESSURE: 66 MMHG | SYSTOLIC BLOOD PRESSURE: 133 MMHG | HEIGHT: 64 IN | WEIGHT: 173.13 LBS | RESPIRATION RATE: 18 BRPM | OXYGEN SATURATION: 96 % | TEMPERATURE: 99.3 F

## 2020-08-04 DIAGNOSIS — N30.00 ACUTE CYSTITIS WITHOUT HEMATURIA: ICD-10-CM

## 2020-08-04 DIAGNOSIS — Z87.440 HISTORY OF UTI: Primary | ICD-10-CM

## 2020-08-04 LAB
BILIRUB UR QL STRIP: NEGATIVE
GLUCOSE UR-MCNC: NEGATIVE MG/DL
KETONES P FAST UR STRIP-MCNC: NORMAL MG/DL
PH UR STRIP: 6 [PH] (ref 4.6–8)
PROT UR QL STRIP: NEGATIVE
SP GR UR STRIP: 1.02 (ref 1–1.03)
UA UROBILINOGEN AMB POC: NORMAL (ref 0.2–1)
URINALYSIS CLARITY POC: CLEAR
URINALYSIS COLOR POC: NORMAL
URINE BLOOD POC: NEGATIVE
URINE LEUKOCYTES POC: NORMAL
URINE NITRITES POC: NEGATIVE

## 2020-08-04 PROCEDURE — G8419 CALC BMI OUT NRM PARAM NOF/U: HCPCS | Performed by: INTERNAL MEDICINE

## 2020-08-04 PROCEDURE — G8510 SCR DEP NEG, NO PLAN REQD: HCPCS | Performed by: INTERNAL MEDICINE

## 2020-08-04 PROCEDURE — G8536 NO DOC ELDER MAL SCRN: HCPCS | Performed by: INTERNAL MEDICINE

## 2020-08-04 PROCEDURE — 99213 OFFICE O/P EST LOW 20 MIN: CPT | Performed by: INTERNAL MEDICINE

## 2020-08-04 PROCEDURE — 3017F COLORECTAL CA SCREEN DOC REV: CPT | Performed by: INTERNAL MEDICINE

## 2020-08-04 PROCEDURE — G8399 PT W/DXA RESULTS DOCUMENT: HCPCS | Performed by: INTERNAL MEDICINE

## 2020-08-04 PROCEDURE — G9899 SCRN MAM PERF RSLTS DOC: HCPCS | Performed by: INTERNAL MEDICINE

## 2020-08-04 PROCEDURE — G8427 DOCREV CUR MEDS BY ELIG CLIN: HCPCS | Performed by: INTERNAL MEDICINE

## 2020-08-04 PROCEDURE — 81001 URINALYSIS AUTO W/SCOPE: CPT | Performed by: INTERNAL MEDICINE

## 2020-08-04 PROCEDURE — 1090F PRES/ABSN URINE INCON ASSESS: CPT | Performed by: INTERNAL MEDICINE

## 2020-08-04 PROCEDURE — 1101F PT FALLS ASSESS-DOCD LE1/YR: CPT | Performed by: INTERNAL MEDICINE

## 2020-08-04 RX ORDER — CEFDINIR 300 MG/1
300 CAPSULE ORAL 2 TIMES DAILY
Qty: 20 CAP | Refills: 0 | Status: SHIPPED | OUTPATIENT
Start: 2020-08-04 | End: 2020-10-14 | Stop reason: ALTCHOICE

## 2020-08-04 NOTE — PROGRESS NOTES
Assessment and Plan   Diagnoses and all orders for this visit:    1. History of UTI  -     AMB POC URINALYSIS DIP STICK AUTO W/ MICRO  2. Acute cystitis without hematuria  -     CULTURE, URINE; Future  -     cefdinir (OMNICEF) 300 mg capsule; Take 1 Cap by mouth two (2) times a day. Had a virtual visit with me on July 22 and was given empiric Macrobid. Reports that her symptoms have not improved and she is still having low back pain, frequency, urgency, abdominal pressure, dysuria. Will send for urine culture. Ideally would have used a fluoroquinolone but she is allergic. Therefore, will do cefdinir for 10 days. Advised to call us on Friday morning if symptoms are not on the way to getting better. Consider IM ceftriaxone in that case assuming her culture is positive      Benefits, risks, possible drug interactions, and side effects of all new medications were reviewed with the patient. Pt verbalized understanding. Return to clinic: As needed    Vi Connor MD  [facility]  8/4/2020    No future appointments. Subjective   Chief Complaint   Persistent UTI    Elier Weiner is a 77 y.o. female     Still having UTI symptoms    Review of Systems   Constitutional: Negative for chills and fever. Respiratory: Negative for shortness of breath. Cardiovascular: Negative for chest pain. Objective   Vitals:       Visit Vitals  /66 (BP 1 Location: Left arm, BP Patient Position: Sitting)   Pulse 68   Temp 99.3 °F (37.4 °C) (Oral)   Resp 18   Ht 5' 4\" (1.626 m)   Wt 173 lb 2 oz (78.5 kg)   SpO2 96%   BMI 29.72 kg/m²        Physical Exam  Constitutional:       Appearance: Normal appearance. She is not ill-appearing. Cardiovascular:      Rate and Rhythm: Normal rate and regular rhythm. Heart sounds: No murmur. No friction rub. No gallop. Pulmonary:      Effort: No respiratory distress. Breath sounds: No wheezing, rhonchi or rales.    Abdominal: General: Bowel sounds are normal. There is no distension. Palpations: Abdomen is soft. There is no mass. Tenderness: There is abdominal tenderness (Mild suprapubic tenderness). There is right CVA tenderness and left CVA tenderness. There is no guarding or rebound. Neurological:      Mental Status: She is alert. Gait: Gait normal.   Psychiatric:         Mood and Affect: Mood normal.         Thought Content: Thought content normal.         Judgment: Judgment normal.          Current Outpatient Medications   Medication Sig    cefdinir (OMNICEF) 300 mg capsule Take 1 Cap by mouth two (2) times a day.  ALPRAZolam (XANAX) 0.5 mg tablet TAKE 1 TABLET BY MOUTH TWICE A DAY AS NEEDED FOR ANXIETY    butalbital-acetaminophen-caff (FIORICET) -40 mg per capsule TAKE 1 CAPSULE BY MOUTH EVERY 6 HOURS AS NEEDED FOR PAIN    famotidine (PEPCID) 20 mg tablet Take 1 Tab by mouth nightly.  fluticasone propionate (Flonase) 50 mcg/actuation nasal spray 2 Sprays by Both Nostrils route daily.  simvastatin (ZOCOR) 20 mg tablet TAKE 1 TABLET BY MOUTH EVERY DAY AT NIGHT    vitamin e (E GEMS) 100 unit capsule Take  by mouth daily.  cholecalciferol, VITAMIN D3, (VITAMIN D3) 5,000 unit tab tablet Take 1 Tab by mouth two (2) times a day.  fexofenadine-pseudoephedrine (ALLEGRA-D 24 HOUR) 180-240 mg per tablet Take 1 Tab by mouth daily.  OTHER Juice plus    OTHER Stool softener    predniSONE (DELTASONE) 20 mg tablet Take two tablets for 3 days then one for 3 days. No current facility-administered medications for this visit.         Voice recognition software is utilized and this note may contain transcription errors

## 2020-08-04 NOTE — TELEPHONE ENCOUNTER
Pt requesting refill on Macrobid as it was only for 3 days and UTI has not cleared up completely. Please send to [facility]. Thanks.

## 2020-08-05 ENCOUNTER — TELEPHONE (OUTPATIENT)
Dept: INTERNAL MEDICINE CLINIC | Age: 65
End: 2020-08-05

## 2020-08-05 NOTE — TELEPHONE ENCOUNTER
Patient is calling to obtain her urine  results from yesterday  , she can be reached at [phone]

## 2020-08-06 NOTE — TELEPHONE ENCOUNTER
Returned call to pt. She has been advised that the culture results are still pending. She has been reminded to increase water intake and can take OTC Tylenol prn pain.  She voices understanding and thanks

## 2020-08-07 LAB
BACTERIA SPEC CULT: ABNORMAL
BACTERIA SPEC CULT: ABNORMAL
CC UR VC: ABNORMAL
SERVICE CMNT-IMP: ABNORMAL

## 2020-08-08 DIAGNOSIS — G43.809 OTHER MIGRAINE WITHOUT STATUS MIGRAINOSUS, NOT INTRACTABLE: ICD-10-CM

## 2020-08-09 RX ORDER — BUTALBITAL, ACETAMINOPHEN AND CAFFEINE 300; 40; 50 MG/1; MG/1; MG/1
CAPSULE ORAL
Qty: 20 CAP | Refills: 1 | Status: SHIPPED | OUTPATIENT
Start: 2020-08-09 | End: 2020-09-09

## 2020-08-10 NOTE — TELEPHONE ENCOUNTER
----- Message from Elaina Beth Page sent at 8/8/2020  9:43 AM EDT -----  Regarding: Dr. Yahaira Medina first and last name:      Reason for call:  Regarding results of culture screening    Callback required yes/no and why:  Yes    Best contact number(s):  [phone]    Details to clarify the request:  Pt states they are still having symptom    Elaina Junior

## 2020-08-10 NOTE — TELEPHONE ENCOUNTER
Spoke with patient and she said that she is actually starting to feel better. She has three days left of the Omnicef. Advised pt of urine culture results and recommended she complete the antibiotic course and if she is not better after completion to contact the office again. Pt understood and was thankful for the call.

## 2020-08-29 DIAGNOSIS — F41.9 ANXIETY: ICD-10-CM

## 2020-08-29 RX ORDER — ALPRAZOLAM 0.5 MG/1
TABLET ORAL
Qty: 30 TAB | Refills: 0 | Status: SHIPPED | OUTPATIENT
Start: 2020-08-29 | End: 2020-09-27

## 2020-09-09 DIAGNOSIS — G43.809 OTHER MIGRAINE WITHOUT STATUS MIGRAINOSUS, NOT INTRACTABLE: ICD-10-CM

## 2020-09-09 RX ORDER — BUTALBITAL, ACETAMINOPHEN AND CAFFEINE 300; 40; 50 MG/1; MG/1; MG/1
CAPSULE ORAL
Qty: 20 CAP | Refills: 1 | Status: SHIPPED | OUTPATIENT
Start: 2020-09-09 | End: 2020-10-14

## 2020-09-27 DIAGNOSIS — F41.9 ANXIETY: ICD-10-CM

## 2020-09-27 RX ORDER — ALPRAZOLAM 0.5 MG/1
TABLET ORAL
Qty: 30 TAB | Refills: 0 | Status: SHIPPED | OUTPATIENT
Start: 2020-09-27 | End: 2020-10-28

## 2020-10-12 ENCOUNTER — TELEPHONE (OUTPATIENT)
Dept: INTERNAL MEDICINE CLINIC | Age: 65
End: 2020-10-12

## 2020-10-12 RX ORDER — NITROFURANTOIN 25; 75 MG/1; MG/1
100 CAPSULE ORAL 2 TIMES DAILY
Qty: 14 CAP | Refills: 0 | Status: SHIPPED | OUTPATIENT
Start: 2020-10-12 | End: 2021-02-24 | Stop reason: ALTCHOICE

## 2020-10-12 NOTE — TELEPHONE ENCOUNTER
----- Message from Twyla BILLINGSLEY Kathy Neves sent at 10/12/2020 12:05 PM EDT -----  Regarding: ASHANTI Mullen/ Telephone  Reason for call: Requested a call back  Callback required yes/no and why: Yes   Best contact number(s): [phone]  Details to clarify the request: Pt stated she believes she has a UTI and is having some burning with light blood and would like a medication prescribe she would like to speak with Gabriele Gao or a Nurse regarding this matter ASAP.

## 2020-10-12 NOTE — TELEPHONE ENCOUNTER
Per patient she called this mroning with a UTI concern , PSR unable to locate a msge , states she's having severe burning , can not wait until her pre-op apt on the 14th , patient doesn't want to be seen requesting meds only, if possible.  Aware she may need to be seen ,     She can be reached at [phone]

## 2020-10-14 ENCOUNTER — TELEPHONE (OUTPATIENT)
Dept: INTERNAL MEDICINE CLINIC | Age: 65
End: 2020-10-14

## 2020-10-14 ENCOUNTER — OFFICE VISIT (OUTPATIENT)
Dept: INTERNAL MEDICINE CLINIC | Age: 65
End: 2020-10-14
Payer: MEDICARE

## 2020-10-14 VITALS
OXYGEN SATURATION: 97 % | DIASTOLIC BLOOD PRESSURE: 82 MMHG | SYSTOLIC BLOOD PRESSURE: 132 MMHG | HEART RATE: 73 BPM | HEIGHT: 64 IN | TEMPERATURE: 98.8 F | WEIGHT: 165.6 LBS | BODY MASS INDEX: 28.27 KG/M2 | RESPIRATION RATE: 12 BRPM

## 2020-10-14 DIAGNOSIS — Z01.818 PREOP GENERAL PHYSICAL EXAM: ICD-10-CM

## 2020-10-14 DIAGNOSIS — E78.2 MIXED HYPERLIPIDEMIA: ICD-10-CM

## 2020-10-14 DIAGNOSIS — N30.01 ACUTE CYSTITIS WITH HEMATURIA: ICD-10-CM

## 2020-10-14 DIAGNOSIS — H25.9 AGE-RELATED CATARACT OF BOTH EYES, UNSPECIFIED AGE-RELATED CATARACT TYPE: ICD-10-CM

## 2020-10-14 DIAGNOSIS — N30.00 ACUTE CYSTITIS WITHOUT HEMATURIA: ICD-10-CM

## 2020-10-14 DIAGNOSIS — G43.809 OTHER MIGRAINE WITHOUT STATUS MIGRAINOSUS, NOT INTRACTABLE: ICD-10-CM

## 2020-10-14 DIAGNOSIS — E55.9 VITAMIN D DEFICIENCY: ICD-10-CM

## 2020-10-14 LAB
25(OH)D3 SERPL-MCNC: 38.1 NG/ML (ref 30–100)
ALBUMIN SERPL-MCNC: 3.8 G/DL (ref 3.5–5)
ALBUMIN/GLOB SERPL: 1 {RATIO} (ref 1.1–2.2)
ALP SERPL-CCNC: 83 U/L (ref 45–117)
ALT SERPL-CCNC: 34 U/L (ref 12–78)
ANION GAP SERPL CALC-SCNC: 8 MMOL/L (ref 5–15)
AST SERPL-CCNC: 46 U/L (ref 15–37)
BILIRUB SERPL-MCNC: 0.8 MG/DL (ref 0.2–1)
BUN SERPL-MCNC: 10 MG/DL (ref 6–20)
BUN/CREAT SERPL: 9 (ref 12–20)
CALCIUM SERPL-MCNC: 8.9 MG/DL (ref 8.5–10.1)
CHLORIDE SERPL-SCNC: 106 MMOL/L (ref 97–108)
CHOLEST SERPL-MCNC: 215 MG/DL
CO2 SERPL-SCNC: 27 MMOL/L (ref 21–32)
CREAT SERPL-MCNC: 1.07 MG/DL (ref 0.55–1.02)
GLOBULIN SER CALC-MCNC: 3.8 G/DL (ref 2–4)
GLUCOSE SERPL-MCNC: 85 MG/DL (ref 65–100)
HDLC SERPL-MCNC: 45 MG/DL
HDLC SERPL: 4.8 {RATIO} (ref 0–5)
LDLC SERPL CALC-MCNC: 131.2 MG/DL (ref 0–100)
LIPID PROFILE,FLP: ABNORMAL
POTASSIUM SERPL-SCNC: 4.4 MMOL/L (ref 3.5–5.1)
PROT SERPL-MCNC: 7.6 G/DL (ref 6.4–8.2)
SODIUM SERPL-SCNC: 141 MMOL/L (ref 136–145)
TRIGL SERPL-MCNC: 194 MG/DL (ref ?–150)
VLDLC SERPL CALC-MCNC: 38.8 MG/DL

## 2020-10-14 PROCEDURE — G8399 PT W/DXA RESULTS DOCUMENT: HCPCS | Performed by: NURSE PRACTITIONER

## 2020-10-14 PROCEDURE — G8427 DOCREV CUR MEDS BY ELIG CLIN: HCPCS | Performed by: NURSE PRACTITIONER

## 2020-10-14 PROCEDURE — 3017F COLORECTAL CA SCREEN DOC REV: CPT | Performed by: NURSE PRACTITIONER

## 2020-10-14 PROCEDURE — 99214 OFFICE O/P EST MOD 30 MIN: CPT | Performed by: NURSE PRACTITIONER

## 2020-10-14 PROCEDURE — G8432 DEP SCR NOT DOC, RNG: HCPCS | Performed by: NURSE PRACTITIONER

## 2020-10-14 PROCEDURE — 1101F PT FALLS ASSESS-DOCD LE1/YR: CPT | Performed by: NURSE PRACTITIONER

## 2020-10-14 PROCEDURE — G8536 NO DOC ELDER MAL SCRN: HCPCS | Performed by: NURSE PRACTITIONER

## 2020-10-14 PROCEDURE — 1090F PRES/ABSN URINE INCON ASSESS: CPT | Performed by: NURSE PRACTITIONER

## 2020-10-14 PROCEDURE — G9899 SCRN MAM PERF RSLTS DOC: HCPCS | Performed by: NURSE PRACTITIONER

## 2020-10-14 PROCEDURE — G8419 CALC BMI OUT NRM PARAM NOF/U: HCPCS | Performed by: NURSE PRACTITIONER

## 2020-10-14 RX ORDER — BUTALBITAL, ACETAMINOPHEN AND CAFFEINE 300; 40; 50 MG/1; MG/1; MG/1
CAPSULE ORAL
Qty: 20 CAP | Refills: 1 | Status: SHIPPED | OUTPATIENT
Start: 2020-10-14 | End: 2020-11-28

## 2020-10-14 RX ORDER — CEFDINIR 300 MG/1
300 CAPSULE ORAL 2 TIMES DAILY
Qty: 20 CAP | Refills: 0 | Status: SHIPPED | OUTPATIENT
Start: 2020-10-14 | End: 2021-02-24 | Stop reason: ALTCHOICE

## 2020-10-14 NOTE — PROGRESS NOTES
HISTORY OF PRESENT ILLNESS  Trung Manuel is a 77 y.o. female. HPI  Trung Manuel was referred for evaluation by:Dr. Kumar Wang for Pre- Op Evaluation. Please see encounter details and orders for consultative summary. Type of surgery : Cataract extraction with implantation of intraocular lens  Surgery site : Right eye 11/5/2020; Left eye 11//12/2020  Anesthesia type: Regional  Date of procedure:  As above    Allergies: Allergies   Allergen Reactions    Aspirin Not Reported This Time    Bupropion Nausea and Vomiting    Floxin [Ofloxacin] Rash    Ibuprofen Swelling    Maxalt [Rizatriptan] Palpitations    Prevacid [Lansoprazole] Rash    Wellbutrin [Bupropion Hcl] Rash     Latex allergy: no  Prior reactions to anesthesia:  None    Functional status:  she is able to ambulate up 2 flights of step without shortness of breath, chest pain  Prior cardiac evaluation:   None    Subjective:   Trung Manuel is a 77 y.o. female with hyperlipidemia. Cardiovascular risk analysis - 77 y.o. female LDL goal is under 130. ROS: taking medications as instructed, no medication side effects noted, no TIA's, no chest pain on exertion, no dyspnea on exertion, no swelling of ankles. Tolerating meds, no myalgias or other side effects noted  New concerns: she is fasting today for labs. Has some component of white coat hypertension and blood pressure decreased when taken later with manual cuff. Has been taking Vitamin D 5000 units twice daily for the past several years. Reports she has been having some burning with voiding and urinary frequency for the past several days; she called the office on 10/12 and was prescribed Macrobid by Dr Kurt Augustine who was covering for Dr Nadia Mcdowell. She has taken 3 doses thus far along with some AZO and reports symptoms are improving.     Current Outpatient Medications   Medication Sig    nitrofurantoin, macrocrystal-monohydrate, (MACROBID) 100 mg capsule Take 1 Cap by mouth two (2) times a day.  ALPRAZolam (XANAX) 0.5 mg tablet TAKE 1 TABLET BY MOUTH TWICE A DAY AS NEEDED FOR ANXIETY    famotidine (PEPCID) 20 mg tablet Take 1 Tab by mouth nightly.  fluticasone propionate (Flonase) 50 mcg/actuation nasal spray 2 Sprays by Both Nostrils route daily.  simvastatin (ZOCOR) 20 mg tablet TAKE 1 TABLET BY MOUTH EVERY DAY AT NIGHT    vitamin e (E GEMS) 100 unit capsule Take  by mouth daily.  cholecalciferol, VITAMIN D3, (VITAMIN D3) 5,000 unit tab tablet Take 1 Tab by mouth two (2) times a day.  fexofenadine-pseudoephedrine (ALLEGRA-D 24 HOUR) 180-240 mg per tablet Take 1 Tab by mouth daily.  OTHER Juice plus    OTHER Stool softener    butalbital-acetaminophen-caff (FIORICET) -40 mg per capsule TAKE 1 CAPSULE BY MOUTH EVERY 6 HOURS AS NEEDED FOR PAIN (Patient taking differently: TAKE 1 CAPSULE BY MOUTH EVERY 6 HOURS AS NEEDED FOR PAIN  Indications: not taking)     No current facility-administered medications for this visit. Past Medical History:   Diagnosis Date    Anxiety     Gluten intolerance     Hypercholesterolemia     Sinus problem      Past Surgical History:   Procedure Laterality Date    HX COLONOSCOPY      HX GYN      endometrial ablation    HX HEENT      wisdom teeth extraction    HX ORTHOPAEDIC Left     left wrist ulnar nerve transposition    HX SHOULDER ARTHROSCOPY Bilateral     HX TONSILLECTOMY      REPR VAGINAL PROLAPSE,UTEROSACRAL       Social History     Tobacco Use    Smoking status: Never Smoker    Smokeless tobacco: Never Used   Substance Use Topics    Alcohol use: Not Currently     Comment: wine    Drug use: No       Blood pressure 132/82, pulse 73, temperature 98.8 °F (37.1 °C), temperature source Oral, resp. rate 12, height 5' 4\" (1.626 m), weight 165 lb 9.6 oz (75.1 kg), SpO2 97 %. Review of Systems   Constitutional: Negative for chills, fever and malaise/fatigue. HENT: Negative for congestion, sinus pain and sore throat. Eyes: Positive for blurred vision. Respiratory: Negative for cough and shortness of breath. Cardiovascular: Negative for chest pain, palpitations and leg swelling. Gastrointestinal: Positive for abdominal pain. Negative for constipation, diarrhea, nausea and vomiting. Genitourinary: Positive for dysuria, frequency and urgency. Negative for flank pain and hematuria. Musculoskeletal: Negative for back pain. Skin: Negative for rash. Neurological: Negative for dizziness, tingling and headaches. Psychiatric/Behavioral: The patient is nervous/anxious. Physical Exam  Vitals signs and nursing note reviewed. Constitutional:       Appearance: Normal appearance. HENT:      Head: Normocephalic and atraumatic. Right Ear: Tympanic membrane, ear canal and external ear normal.      Left Ear: Tympanic membrane, ear canal and external ear normal.      Nose: Nose normal.      Mouth/Throat:      Mouth: Mucous membranes are moist.      Pharynx: Oropharynx is clear. Eyes:      Extraocular Movements: Extraocular movements intact. Conjunctiva/sclera: Conjunctivae normal.   Neck:      Musculoskeletal: Normal range of motion and neck supple. Cardiovascular:      Rate and Rhythm: Normal rate and regular rhythm. Pulmonary:      Effort: Pulmonary effort is normal.      Breath sounds: Normal breath sounds. No wheezing or rhonchi. Abdominal:      General: Bowel sounds are normal.      Palpations: Abdomen is soft. Tenderness: There is abdominal tenderness in the suprapubic area. Musculoskeletal: Normal range of motion. Lymphadenopathy:      Cervical: No cervical adenopathy. Skin:     General: Skin is warm and dry. Neurological:      General: No focal deficit present. Mental Status: She is alert and oriented to person, place, and time. ASSESSMENT and PLAN  Diagnoses and all orders for this visit:    1.  Age-related cataract of both eyes, unspecified age-related cataract type    2. Preop general physical exam -- considered medically stable for upcoming Cataract extraction surgeries with implantation of intraocular lens under regional anesthesia. Low risk for cardiopulmonary complications. 3. Mixed hyperlipidemia  -     METABOLIC PANEL, COMPREHENSIVE; Future  -     LIPID PANEL; Future    4. Vitamin D deficiency  -     VITAMIN D, 25 HYDROXY; Future    5. Acute cystitis with hematuria  -     CULTURE, URINE;  Future      lab results and schedule of future lab studies reviewed with patient  reviewed diet, exercise and weight control  cardiovascular risk and specific lipid/LDL goals reviewed  reviewed medications and side effects in detail

## 2020-10-14 NOTE — PATIENT INSTRUCTIONS
Cataract Surgery: Before Your Surgery What is cataract surgery? Cataracts are cloudy areas in the lens of your eye. Your lens is behind the colored part of your eye (iris). Its job is to focus light onto the back of your eye. In some people, cataracts prevent light from reaching the back of the eye. This can cause vision problems. Cataract surgery helps you see better. It replaces your natural lens, which has become cloudy, with a clear artificial one. There are two types of cataract surgery. Phacoemulsification (say \"wlxi-vq-br-bil-xxy-ilq-LUIS CARLOS-shun\") is the most common type. The doctor makes a small cut in your eye. This cut is called an incision. The doctor uses a special ultrasound tool to break your cloudy lens apart. Sometimes a laser is used too. Then he or she removes the small pieces of the lens through the incision. In most cases, the doctor then inserts an artificial lens through the incision. Most people do not need stitches, because the incision is so small. If the doctor is not able to put in an artificial lens, you can wear a contact lens or thick glasses in place of your natural lens. Extracapsular extraction is a less common type of cataract surgery. The doctor makes a larger incision to remove the whole lens at once. After the doctor removes the lens, he or she stitches up the incision. Recovery from this type of surgery takes longer. Before either surgery, the doctor puts numbing drops in your eye. Some doctors use a shot instead. You may also get medicine to make you feel relaxed. You probably will not feel much pain. The surgery takes about 20 to 40 minutes. After surgery, you may have a bandage or shield on your eye. You will probably go home from surgery after 1 hour in the recovery room. Most people see better in 1 to 3 days. You may be able to go back to work or your normal routine in a few days.  It could take 3 to 10 weeks for your eye to completely heal. After your eye heals, you may still need to wear glasses, especially for reading. Follow-up care is a key part of your treatment and safety. Be sure to make and go to all appointments, and call your doctor if you are having problems. It's also a good idea to know your test results and keep a list of the medicines you take. How do you prepare for surgery? Surgery can be stressful. This information will help you understand what you can expect. And it will help you safely prepare for surgery. Preparing for surgery 
  · Be sure you have someone to take you home. Anesthesia and pain medicine will make it unsafe for you to drive or get home on your own.  
  · Understand exactly what surgery is planned, along with the risks, benefits, and other options.  
  · If you take aspirin or some other blood thinner, ask your doctor if you should stop taking it before your surgery. Make sure that you understand exactly what your doctor wants you to do. These medicines increase the risk of bleeding.  
  · Tell your doctor ALL the medicines, vitamins, supplements, and herbal remedies you take. Some may increase the risk of problems during your surgery. Your doctor will tell you if you should stop taking any of them before the surgery and how soon to do it.  
  · Make sure your doctor and the hospital have a copy of your advance directive. If you don't have one, you may want to prepare one. It lets others know your health care wishes. It's a good thing to have before any type of surgery or procedure. What happens on the day of surgery? · Follow the instructions exactly about when to stop eating and drinking. If you don't, your surgery may be canceled. If your doctor told you to take your medicines on the day of surgery, take them with only a sip of water.  
  · Take a bath or shower before you come in for your surgery. Do not apply lotions, perfumes, deodorants, or nail polish.   · Take off all jewelry and piercings. And take out contact lenses, if you wear them. At the hospital or surgery center · Bring a picture ID.  
  · The area for surgery is often marked to make sure there are no errors.  
  · You will be kept comfortable and safe by your anesthesia provider. The anesthesia may make you sleep. Or it may just numb the area being worked on.  
  · The surgery will take about 20 to 40 minutes. When should you call your doctor? · You have questions or concerns.  
  · You don't understand how to prepare for your surgery.  
  · You become ill before the surgery (such as fever, flu, or a cold).  
  · You need to reschedule or have changed your mind about having the surgery. Where can you learn more? Go to http://www.gray.com/ Enter K474 in the search box to learn more about \"Cataract Surgery: Before Your Surgery. \" Current as of: December 18, 2019               Content Version: 12.6 © 5057-9531 [address]. Care instructions adapted under license by [employer] (which disclaims liability or warranty for this information). If you have questions about a medical condition or this instruction, always ask your healthcare professional. [place] [address] any warranty or liability for your use of this information. Urinary Tract Infection in Women: Care Instructions Your Care Instructions A urinary tract infection, or UTI, is a general term for an infection anywhere between the kidneys and the urethra (where urine comes out). Most UTIs are bladder infections. They often cause pain or burning when you urinate. UTIs are caused by bacteria and can be cured with antibiotics. Be sure to complete your treatment so that the infection goes away. Follow-up care is a key part of your treatment and safety.  Be sure to make and go to all appointments, and call your doctor if you are having problems. It's also a good idea to know your test results and keep a list of the medicines you take. How can you care for yourself at home? · Take your antibiotics as directed. Do not stop taking them just because you feel better. You need to take the full course of antibiotics. · Drink extra water and other fluids for the next day or two. This may help wash out the bacteria that are causing the infection. (If you have kidney, heart, or liver disease and have to limit fluids, talk with your doctor before you increase your fluid intake.) · Avoid drinks that are carbonated or have caffeine. They can irritate the bladder. · Urinate often. Try to empty your bladder each time. · To relieve pain, take a hot bath or lay a heating pad set on low over your lower belly or genital area. Never go to sleep with a heating pad in place. To prevent UTIs · Drink plenty of water each day. This helps you urinate often, which clears bacteria from your system. (If you have kidney, heart, or liver disease and have to limit fluids, talk with your doctor before you increase your fluid intake.) · Urinate when you need to. · Urinate right after you have sex. · Change sanitary pads often. · Avoid douches, bubble baths, feminine hygiene sprays, and other feminine hygiene products that have deodorants. · After going to the bathroom, wipe from front to back. When should you call for help? Call your doctor now or seek immediate medical care if: 
  · Symptoms such as fever, chills, nausea, or vomiting get worse or appear for the first time.  
  · You have new pain in your back just below your rib cage. This is called flank pain.  
  · There is new blood or pus in your urine.  
  · You have any problems with your antibiotic medicine. Watch closely for changes in your health, and be sure to contact your doctor if: 
  · You are not getting better after taking an antibiotic for 2 days.   · Your symptoms go away but then come back. Where can you learn more? Go to http://www.gray.com/ Enter W606 in the search box to learn more about \"Urinary Tract Infection in Women: Care Instructions. \" Current as of: June 29, 2020               Content Version: 12.6 © 4514-4830 [employer]. Care instructions adapted under license by [employer] (which disclaims liability or warranty for this information). If you have questions about a medical condition or this instruction, always ask your healthcare professional. [address] any warranty or liability for your use of this information.

## 2020-10-15 LAB
BACTERIA SPEC CULT: NORMAL
SERVICE CMNT-IMP: NORMAL

## 2020-10-28 DIAGNOSIS — F41.9 ANXIETY: ICD-10-CM

## 2020-10-28 RX ORDER — ALPRAZOLAM 0.5 MG/1
TABLET ORAL
Qty: 30 TAB | Refills: 0 | Status: SHIPPED | OUTPATIENT
Start: 2020-10-28 | End: 2020-11-29

## 2020-11-27 DIAGNOSIS — G43.809 OTHER MIGRAINE WITHOUT STATUS MIGRAINOSUS, NOT INTRACTABLE: ICD-10-CM

## 2020-11-28 DIAGNOSIS — F41.9 ANXIETY: ICD-10-CM

## 2020-11-28 RX ORDER — BUTALBITAL, ACETAMINOPHEN AND CAFFEINE 300; 40; 50 MG/1; MG/1; MG/1
CAPSULE ORAL
Qty: 20 CAP | Refills: 1 | Status: SHIPPED | OUTPATIENT
Start: 2020-11-28 | End: 2021-01-23

## 2020-11-29 RX ORDER — ALPRAZOLAM 0.5 MG/1
TABLET ORAL
Qty: 30 TAB | Refills: 0 | Status: SHIPPED | OUTPATIENT
Start: 2020-11-29 | End: 2021-01-14

## 2021-01-14 DIAGNOSIS — F41.9 ANXIETY: ICD-10-CM

## 2021-01-14 RX ORDER — ALPRAZOLAM 0.5 MG/1
TABLET ORAL
Qty: 30 TAB | Refills: 0 | Status: SHIPPED | OUTPATIENT
Start: 2021-01-14 | End: 2021-02-16

## 2021-01-23 DIAGNOSIS — G43.809 OTHER MIGRAINE WITHOUT STATUS MIGRAINOSUS, NOT INTRACTABLE: ICD-10-CM

## 2021-01-23 RX ORDER — BUTALBITAL, ACETAMINOPHEN AND CAFFEINE 300; 40; 50 MG/1; MG/1; MG/1
CAPSULE ORAL
Qty: 20 CAP | Refills: 1 | Status: SHIPPED | OUTPATIENT
Start: 2021-01-23 | End: 2021-03-11

## 2021-02-15 DIAGNOSIS — F41.9 ANXIETY: ICD-10-CM

## 2021-02-16 RX ORDER — ALPRAZOLAM 0.5 MG/1
TABLET ORAL
Qty: 30 TAB | Refills: 0 | Status: SHIPPED | OUTPATIENT
Start: 2021-02-16 | End: 2021-03-17

## 2021-02-23 PROBLEM — R73.01 IFG (IMPAIRED FASTING GLUCOSE): Status: RESOLVED | Noted: 2017-10-10 | Resolved: 2021-02-23

## 2021-02-24 ENCOUNTER — OFFICE VISIT (OUTPATIENT)
Dept: INTERNAL MEDICINE CLINIC | Age: 66
End: 2021-02-24
Payer: MEDICARE

## 2021-02-24 VITALS
SYSTOLIC BLOOD PRESSURE: 120 MMHG | TEMPERATURE: 98.1 F | DIASTOLIC BLOOD PRESSURE: 72 MMHG | HEART RATE: 74 BPM | BODY MASS INDEX: 26.98 KG/M2 | HEIGHT: 64 IN | RESPIRATION RATE: 18 BRPM | WEIGHT: 158 LBS | OXYGEN SATURATION: 97 %

## 2021-02-24 DIAGNOSIS — Z12.31 ENCOUNTER FOR SCREENING MAMMOGRAM FOR BREAST CANCER: ICD-10-CM

## 2021-02-24 DIAGNOSIS — E78.2 MIXED HYPERLIPIDEMIA: ICD-10-CM

## 2021-02-24 DIAGNOSIS — Z00.00 MEDICARE ANNUAL WELLNESS VISIT, SUBSEQUENT: Primary | ICD-10-CM

## 2021-02-24 DIAGNOSIS — Z71.89 ADVANCED DIRECTIVES, COUNSELING/DISCUSSION: ICD-10-CM

## 2021-02-24 DIAGNOSIS — F41.9 ANXIETY: ICD-10-CM

## 2021-02-24 LAB
ALBUMIN SERPL-MCNC: 4.1 G/DL (ref 3.5–5)
ALBUMIN/GLOB SERPL: 1.2 {RATIO} (ref 1.1–2.2)
ALP SERPL-CCNC: 101 U/L (ref 45–117)
ALT SERPL-CCNC: 32 U/L (ref 12–78)
ANION GAP SERPL CALC-SCNC: 9 MMOL/L (ref 5–15)
AST SERPL-CCNC: 35 U/L (ref 15–37)
BILIRUB SERPL-MCNC: 0.7 MG/DL (ref 0.2–1)
BUN SERPL-MCNC: 13 MG/DL (ref 6–20)
BUN/CREAT SERPL: 14 (ref 12–20)
CALCIUM SERPL-MCNC: 9.4 MG/DL (ref 8.5–10.1)
CHLORIDE SERPL-SCNC: 107 MMOL/L (ref 97–108)
CHOLEST SERPL-MCNC: 226 MG/DL
CO2 SERPL-SCNC: 25 MMOL/L (ref 21–32)
CREAT SERPL-MCNC: 0.95 MG/DL (ref 0.55–1.02)
GLOBULIN SER CALC-MCNC: 3.4 G/DL (ref 2–4)
GLUCOSE SERPL-MCNC: 101 MG/DL (ref 65–100)
HDLC SERPL-MCNC: 51 MG/DL
HDLC SERPL: 4.4 {RATIO} (ref 0–5)
LDLC SERPL CALC-MCNC: 137.4 MG/DL (ref 0–100)
LIPID PROFILE,FLP: ABNORMAL
POTASSIUM SERPL-SCNC: 4.2 MMOL/L (ref 3.5–5.1)
PROT SERPL-MCNC: 7.5 G/DL (ref 6.4–8.2)
SODIUM SERPL-SCNC: 141 MMOL/L (ref 136–145)
TRIGL SERPL-MCNC: 188 MG/DL (ref ?–150)
VLDLC SERPL CALC-MCNC: 37.6 MG/DL

## 2021-02-24 PROCEDURE — G8419 CALC BMI OUT NRM PARAM NOF/U: HCPCS | Performed by: INTERNAL MEDICINE

## 2021-02-24 PROCEDURE — G9899 SCRN MAM PERF RSLTS DOC: HCPCS | Performed by: INTERNAL MEDICINE

## 2021-02-24 PROCEDURE — 1101F PT FALLS ASSESS-DOCD LE1/YR: CPT | Performed by: INTERNAL MEDICINE

## 2021-02-24 PROCEDURE — G0439 PPPS, SUBSEQ VISIT: HCPCS | Performed by: INTERNAL MEDICINE

## 2021-02-24 PROCEDURE — 3017F COLORECTAL CA SCREEN DOC REV: CPT | Performed by: INTERNAL MEDICINE

## 2021-02-24 PROCEDURE — G8427 DOCREV CUR MEDS BY ELIG CLIN: HCPCS | Performed by: INTERNAL MEDICINE

## 2021-02-24 PROCEDURE — G8399 PT W/DXA RESULTS DOCUMENT: HCPCS | Performed by: INTERNAL MEDICINE

## 2021-02-24 PROCEDURE — G8536 NO DOC ELDER MAL SCRN: HCPCS | Performed by: INTERNAL MEDICINE

## 2021-02-24 PROCEDURE — G8510 SCR DEP NEG, NO PLAN REQD: HCPCS | Performed by: INTERNAL MEDICINE

## 2021-02-24 RX ORDER — ACETAMINOPHEN 325 MG/1
TABLET ORAL
COMMUNITY

## 2021-02-24 RX ORDER — OFLOXACIN 3 MG/ML
SOLUTION/ DROPS OPHTHALMIC
COMMUNITY
Start: 2020-11-12 | End: 2021-02-24 | Stop reason: ALTCHOICE

## 2021-02-24 RX ORDER — KETOROLAC TROMETHAMINE 5 MG/ML
SOLUTION OPHTHALMIC
COMMUNITY
Start: 2020-11-13 | End: 2021-08-19 | Stop reason: ALTCHOICE

## 2021-02-24 NOTE — PROGRESS NOTES
Farnaz Kaur is a 77 y.o. female who presents today for Complete Physical  .      She has a history of   Patient Active Problem List   Diagnosis Code    Hyperlipidemia E78.5    Gastroesophageal reflux disease K21.9    Allergic rhinitis J30.9    Anxiety F41.9   . Today patient is here for complete physical exam..   she does have other concerns. COVID-19: :had this in December. Was quite sick, but did not require hospitalization. Was very sick. Did loose a family member to [place]    Hyperlipidemia  Off zocor. .   Lab Results   Component Value Date/Time    Cholesterol, total 215 (H) 10/14/2020 10:22 AM    HDL Cholesterol 45 10/14/2020 10:22 AM    LDL, calculated 131.2 (H) 10/14/2020 10:22 AM    VLDL, calculated 38.8 10/14/2020 10:22 AM    Triglyceride 194 (H) 10/14/2020 10:22 AM    CHOL/HDL Ratio 4.8 10/14/2020 10:22 AM     Anxiety- Using cognium for mind. Patient is seen for anxiety disorder. Current treatment includes Xanax and no other therapies. Ongoing symptoms include: none. Patient denies: sweating, chest pain, dizziness, paresthesias, racing thoughts, feelings of losing control, difficulty concentrating, suicidal ideation. Denies substance or alcohol abuse. Reported side effects from the treatment: none. Health maintenance hx includes:  Exercise: moderately active.  Form of exercise: daily walking.    Diet: generally follows a low fat low cholesterol diet, nonsmoker, alcohol intake none  Social: Taking care of grandchildren.     Screening:    Colon cancer screening:  Last Colonoscopy: 2015 and   was normal   Breast cancer screening: last mammogram 2020 and   was normal   Cervical cancer screening: last PAP/Pelvic exam: N/A   Osteoporosis screening:  Last BMD:  2020 and revealed    - Osteopenia      Immunizations:     Immunization History   Administered Date(s) Administered    Influenza High Dose Vaccine PF 08/14/2018, 10/25/2019    Influenza Vaccine (>6 mo Afluria QUAD Vial 80049 (0.25 mL) / 35669 (0.5 mL)) 11/03/2016    Influenza Vaccine [employer]) PF (>6 Mo Flulaval, Fluarix, and >3 Yrs Afluria, Fluzone 51298) 11/03/2016, 10/10/2017    Influenza, High-dose, Quadrivalent (>65 Yrs Fluzone High Dose Quad 91157) 09/20/2020    MMR 10/19/2006    Pneumococcal Conjugate (PCV-13) 10/03/2018    Pneumococcal Polysaccharide (PPSV-23) 09/19/2019, 10/20/2019    Tdap 10/01/2006    Zoster Recombinant 05/17/2019, 09/04/2019    Zoster Vaccine, Live 09/23/2014      Immunization status: up to date and documented. ROS  Review of Systems   Constitutional: Negative for chills, fever and weight loss. HENT: Negative for congestion and sore throat. Eyes: Negative for blurred vision, double vision and photophobia. Respiratory: Negative for cough and shortness of breath. Cardiovascular: Negative for chest pain, palpitations and leg swelling. Gastrointestinal: Negative for abdominal pain, constipation, diarrhea, heartburn, nausea and vomiting. Genitourinary: Negative for dysuria, frequency and urgency. Musculoskeletal: Negative for joint pain and myalgias. Skin: Negative for rash. Neurological: Negative. Negative for headaches. Endo/Heme/Allergies: Does not bruise/bleed easily. Psychiatric/Behavioral: Negative for memory loss and suicidal ideas. Visit Vitals  /72   Pulse 74   Temp 98.1 °F (36.7 °C) (Oral)   Resp 18   Ht 5' 4\" (1.626 m)   Wt 158 lb (71.7 kg)   SpO2 97%   BMI 27.12 kg/m²       Physical Exam  Constitutional:       General: She is not in acute distress. Appearance: She is well-developed. HENT:      Head: Normocephalic and atraumatic. Neck:      Musculoskeletal: Normal range of motion and neck supple. Thyroid: No thyromegaly. Cardiovascular:      Rate and Rhythm: Normal rate and regular rhythm. Heart sounds: No murmur. Pulmonary:      Effort: Pulmonary effort is normal.      Breath sounds: Normal breath sounds. No wheezing.    Abdominal:      General: Bowel sounds are normal. There is no distension. Palpations: Abdomen is soft. Skin:     General: Skin is warm and dry. Neurological:      Mental Status: She is alert and oriented to person, place, and time. Cranial Nerves: No cranial nerve deficit. Psychiatric:         Behavior: Behavior normal.           Current Outpatient Medications   Medication Sig    acetaminophen (TylenoL) 325 mg tablet Tylenol 325 mg tablet   PO in office    ketorolac (ACULAR) 0.5 % ophthalmic solution ketorolac 0.5 % eye drops   INSTILL 1 DROP IN EYE 4 TIMES EVERY DAY INTO EYE HAVING SURGERY, START 2 DAYS PRIOR TO SURGERY    ALPRAZolam (XANAX) 0.5 mg tablet TAKE 1 TABLET BY MOUTH TWICE A DAY AS NEEDED FOR ANXIETY    butalbital-acetaminophen-caff (FIORICET) -40 mg per capsule TAKE 1 CAPSULE BY MOUTH EVERY 6 HOURS AS NEEDED FOR PAIN    famotidine (PEPCID) 20 mg tablet Take 1 Tab by mouth nightly.  fluticasone propionate (Flonase) 50 mcg/actuation nasal spray 2 Sprays by Both Nostrils route daily.  vitamin e (E GEMS) 100 unit capsule Take  by mouth daily.  cholecalciferol, VITAMIN D3, (VITAMIN D3) 5,000 unit tab tablet Take 1 Tab by mouth two (2) times a day.  fexofenadine-pseudoephedrine (ALLEGRA-D 24 HOUR) 180-240 mg per tablet Take 1 Tab by mouth daily.  OTHER Juice plus    OTHER Stool softener     No current facility-administered medications for this visit.          Past Medical History:   Diagnosis Date    Anxiety     Gluten intolerance     Hypercholesterolemia     Sinus problem       Past Surgical History:   Procedure Laterality Date    HX COLONOSCOPY      HX GYN      endometrial ablation    HX HEENT      wisdom teeth extraction    HX ORTHOPAEDIC Left     left wrist ulnar nerve transposition    HX SHOULDER ARTHROSCOPY Bilateral     HX TONSILLECTOMY      MN REVAGINAL PROLAPSE,UTEROSACRAL        Social History     Tobacco Use    Smoking status: Never Smoker    Smokeless tobacco: Never Used   Substance Use Topics    Alcohol use: Not Currently     Comment: wine      Family History   Problem Relation Age of Onset    Diabetes Mother     Hypertension Mother     Celiac Disease Father     Celiac Disease Brother     Diabetes Maternal Grandmother     No Known Problems Brother     No Known Problems Brother         Allergies   Allergen Reactions    Aspirin Not Reported This Time    Bupropion Nausea and Vomiting    Floxin [Ofloxacin] Rash    Ibuprofen Swelling    Maxalt [Rizatriptan] Palpitations    Prevacid [Lansoprazole] Rash    Wellbutrin [Bupropion Hcl] Rash        Assessment/Plan  Diagnoses and all orders for this visit:    1. Medicare annual wellness visit, subsequent- Enrico Kehr was counseled on age-appropriate/ guideline-based risk prevention behaviors and screening for a 77y.o. year old   female . We also discussed adjustments in screening based on family history if necessary. Printed instructions for preventative screening guidelines and healthy behaviors given to patient with after visit summary. 2. Advanced directives, counseling/discussion-     3. Anxiety= stable . 4. Mixed hyperlipidemia- off statin, if still elevated, resume at 10mg. -     METABOLIC PANEL, COMPREHENSIVE; Future  -     LIPID PANEL; Future    5. Encounter for screening mammogram for breast cancer  -     [facility] MAMMO BI SCREENING INCL CAD; Future            Kelly Landry MD  2/24/2021    This note was created with the help of speech recognition software [employer]) and may contain some 'sound alike' errors. This is the Subsequent Medicare Annual Wellness Exam, performed 12 months or more after the Initial AWV or the last Subsequent AWV    I have reviewed the patient's medical history in detail and updated the computerized patient record.      Depression Risk Factor Screening:     3 most recent PHQ Screens 2/24/2021   Little interest or pleasure in doing things Not at all   Feeling down, depressed, irritable, or hopeless Not at all   Total Score PHQ 2 0   Trouble falling or staying asleep, or sleeping too much -   Feeling tired or having little energy -   Poor appetite, weight loss, or overeating -   Feeling bad about yourself - or that you are a failure or have let yourself or your family down -   Trouble concentrating on things such as school, work, reading, or watching TV -   Moving or speaking so slowly that other people could have noticed; or the opposite being so fidgety that others notice -   Thoughts of being better off dead, or hurting yourself in some way -   PHQ 9 Score -   How difficult have these problems made it for you to do your work, take care of your home and get along with others -       Alcohol Risk Screen    Do you average more than 1 drink per night or more than 7 drinks a week:  No    On any one occasion in the past three months have you have had more than 3 drinks containing alcohol:  No        Functional Ability and Level of Safety:    Hearing: Hearing is good. Activities of Daily Living: The home contains: no safety equipment. Patient does total self care      Ambulation: with no difficulty     Fall Risk:  Fall Risk Assessment, last 12 mths 2/24/2021   Able to walk? Yes   Fall in past 12 months? 0   Do you feel unsteady? 0   Are you worried about falling 0      Abuse Screen:  Patient is not abused       Cognitive Screening    Has your family/caregiver stated any concerns about your memory: no       Assessment/Plan   Education and counseling provided:  Are appropriate based on today's review and evaluation  End-of-Life planning (with patient's consent)  Pneumococcal Vaccine  Colorectal cancer screening tests  Bone mass measurement (DEXA)    Diagnoses and all orders for this visit:    1. Medicare annual wellness visit, subsequent    2. Advanced directives, counseling/discussion    3. Anxiety    4. Mixed hyperlipidemia  -     METABOLIC PANEL, COMPREHENSIVE;  Future  -     LIPID PANEL; Future    5. Encounter for screening mammogram for breast cancer  -     [facility] MAMMO BI SCREENING INCL CAD; Future        Health Maintenance Due     Health Maintenance Due   Topic Date Due    COVID-19 Vaccine (1 of 2) [birth year]/1969    DTaP/Tdap/Td series (2 - Td) 10/01/2016    GLAUCOMA SCREENING Q2Y  04/05/2018    Breast Cancer Screen Mammogram  02/06/2021       Patient Care Team   Patient Care Team:  Morris Castro MD as PCP - General (Internal Medicine)  Morris Castro MD as PCP - [facility] Empaneled Provider    History     Patient Active Problem List   Diagnosis Code    Hyperlipidemia E78.5    Gastroesophageal reflux disease K21.9    Allergic rhinitis J30.9    Anxiety F41.9     Past Medical History:   Diagnosis Date    Anxiety     Gluten intolerance     Hypercholesterolemia     Sinus problem       Past Surgical History:   Procedure Laterality Date    HX COLONOSCOPY      HX GYN      endometrial ablation    HX HEENT      wisdom teeth extraction    HX ORTHOPAEDIC Left     left wrist ulnar nerve transposition    HX SHOULDER ARTHROSCOPY Bilateral     HX TONSILLECTOMY      ME REVAGINAL PROLAPSE,UTEROSACRAL       Current Outpatient Medications   Medication Sig Dispense Refill    acetaminophen (TylenoL) 325 mg tablet Tylenol 325 mg tablet   PO in office      ketorolac (ACULAR) 0.5 % ophthalmic solution ketorolac 0.5 % eye drops   INSTILL 1 DROP IN EYE 4 TIMES EVERY DAY INTO EYE HAVING SURGERY, START 2 DAYS PRIOR TO SURGERY      ALPRAZolam (XANAX) 0.5 mg tablet TAKE 1 TABLET BY MOUTH TWICE A DAY AS NEEDED FOR ANXIETY 30 Tab 0    butalbital-acetaminophen-caff (FIORICET) -40 mg per capsule TAKE 1 CAPSULE BY MOUTH EVERY 6 HOURS AS NEEDED FOR PAIN 20 Cap 1    famotidine (PEPCID) 20 mg tablet Take 1 Tab by mouth nightly. 90 Tab 1    fluticasone propionate (Flonase) 50 mcg/actuation nasal spray 2 Sprays by Both Nostrils route daily.  3 Bottle 3    vitamin e (E GEMS) 100 unit capsule Take  by mouth daily.  cholecalciferol, VITAMIN D3, (VITAMIN D3) 5,000 unit tab tablet Take 1 Tab by mouth two (2) times a day. 180 Tab 1    fexofenadine-pseudoephedrine (ALLEGRA-D 24 HOUR) 180-240 mg per tablet Take 1 Tab by mouth daily.       OTHER Juice plus      OTHER Stool softener       Allergies   Allergen Reactions    Aspirin Not Reported This Time    Bupropion Nausea and Vomiting    Floxin [Ofloxacin] Rash    Ibuprofen Swelling    Maxalt [Rizatriptan] Palpitations    Prevacid [Lansoprazole] Rash    Wellbutrin [Bupropion Hcl] Rash       Family History   Problem Relation Age of Onset    Diabetes Mother     Hypertension Mother     Celiac Disease Father     Celiac Disease Brother     Diabetes Maternal Grandmother     No Known Problems Brother     No Known Problems Brother      Social History     Tobacco Use    Smoking status: Never Smoker    Smokeless tobacco: Never Used   Substance Use Topics    Alcohol use: Not Currently     Comment: wine

## 2021-02-24 NOTE — ACP (ADVANCE CARE PLANNING)
Advance Care Planning     General Advance Care Planning (ACP) Conversation      Date of Conversation: 2/24/2021  Conducted with: Patient with Decision Making Capacity    Healthcare Decision Maker:     Click here to complete [address] including selection of the Healthcare Decision Maker Relationship (ie \"Primary\")  Today we documented Decision Maker(s). The patient will provide ACP documents. Content/Action Overview:    Has ACP document(s) NOT on file - requested patient to provide      Length of Voluntary ACP Conversation in minutes:  <16 minutes (Non-Billable)    Marshall Elizabeth MD

## 2021-02-24 NOTE — PATIENT INSTRUCTIONS
Medicare Wellness Visit, Female The best way to live healthy is to have a lifestyle where you eat a well-balanced diet, exercise regularly, limit alcohol use, and quit all forms of tobacco/nicotine, if applicable. Regular preventive services are another way to keep healthy. Preventive services (vaccines, screening tests, monitoring & exams) can help personalize your care plan, which helps you manage your own care. Screening tests can find health problems at the earliest stages, when they are easiest to treat. [facility] follows the current, evidence-based guidelines published by the [facility] Mauro Gaona ([facility]) when recommending preventive services for our patients. Because we follow these guidelines, sometimes recommendations change over time as research supports it. (For example, mammograms used to be recommended annually. Even though Medicare will still pay for an annual mammogram, the newer guidelines recommend a mammogram every two years for women of average risk). Of course, you and your doctor may decide to screen more often for some diseases, based on your risk and your co-morbidities (chronic disease you are already diagnosed with). Preventive services for you include: - Medicare offers their members a free annual wellness visit, which is time for you and your primary care provider to discuss and plan for your preventive service needs. Take advantage of this benefit every year! 
-All adults over the age of 72 should receive the recommended pneumonia vaccines. Current USPSTF guidelines recommend a series of two vaccines for the best pneumonia protection.  
-All adults should have a flu vaccine yearly and a tetanus vaccine every 10 years.  
-All adults age 48 and older should receive the shingles vaccines (series of two vaccines). -All adults age 38-68 who are overweight should have a diabetes screening test once every three years. -All adults born between [birth year] and 1967 should be screened once for Hepatitis C. 
-Other screening tests and preventive services for persons with diabetes include: an eye exam to screen for diabetic retinopathy, a kidney function test, a foot exam, and stricter control over your cholesterol.  
-Cardiovascular screening for adults with routine risk involves an electrocardiogram (ECG) at intervals determined by your doctor.  
-Colorectal cancer screenings should be done for adults age 54-65 with no increased risk factors for colorectal cancer. There are a number of acceptable methods of screening for this type of cancer. Each test has its own benefits and drawbacks. Discuss with your doctor what is most appropriate for you during your annual wellness visit. The different tests include: colonoscopy (considered the best screening method), a fecal occult blood test, a fecal DNA test, and sigmoidoscopy. 
 
-A bone mass density test is recommended when a woman turns 65 to screen for osteoporosis. This test is only recommended one time, as a screening. Some providers will use this same test as a disease monitoring tool if you already have osteoporosis. -Breast cancer screenings are recommended every other year for women of normal risk, age 54-69. 
-Cervical cancer screenings for women over age 72 are only recommended with certain risk factors. Here is a list of your current Health Maintenance items (your personalized list of preventive services) with a due date: 
Health Maintenance Due Topic Date Due  
 COVID-19 Vaccine (1 of 2) 04[birth year]05/1969  
 DTaP/Tdap/Td  (2 - Td) 10/01/2016  Glaucoma Screening   04/05/2018  Mammogram  02/06/2021

## 2021-02-25 DIAGNOSIS — E78.5 HYPERLIPIDEMIA, UNSPECIFIED HYPERLIPIDEMIA TYPE: ICD-10-CM

## 2021-02-25 RX ORDER — SIMVASTATIN 10 MG/1
TABLET, FILM COATED ORAL
Qty: 90 TAB | Refills: 3 | Status: SHIPPED | OUTPATIENT
Start: 2021-02-25 | End: 2021-03-31 | Stop reason: SDUPTHER

## 2021-02-26 ENCOUNTER — TELEPHONE (OUTPATIENT)
Dept: INTERNAL MEDICINE CLINIC | Age: 66
End: 2021-02-26

## 2021-02-26 ENCOUNTER — PATIENT MESSAGE (OUTPATIENT)
Dept: INTERNAL MEDICINE CLINIC | Age: 66
End: 2021-02-26

## 2021-02-26 ENCOUNTER — HOSPITAL ENCOUNTER (OUTPATIENT)
Dept: MAMMOGRAPHY | Age: 66
Discharge: HOME | End: 2021-02-26
Attending: INTERNAL MEDICINE
Payer: MEDICARE

## 2021-02-26 DIAGNOSIS — Z12.31 ENCOUNTER FOR SCREENING MAMMOGRAM FOR BREAST CANCER: ICD-10-CM

## 2021-02-26 PROCEDURE — 77067 SCR MAMMO BI INCL CAD: CPT

## 2021-02-26 NOTE — PROGRESS NOTES
Please inform that lipids are high still, resume simvastatin at lower dose 10mg. See me in 6 months for repeat lipids.

## 2021-02-26 NOTE — TELEPHONE ENCOUNTER
----- Message from Helen Hammans sent at 2/26/2021  8:40 AM EST -----  Regarding: Shelly Mckeon/Telephone  Patient return call    Caller's first and last name and relationship (if not the patient):      Best contact number(s): [phone]      Whose call is being returned: nurse      Details to clarify the request: N/A       Helen Hammans

## 2021-03-01 ENCOUNTER — TELEPHONE (OUTPATIENT)
Dept: INTERNAL MEDICINE CLINIC | Age: 66
End: 2021-03-01

## 2021-03-01 NOTE — TELEPHONE ENCOUNTER
----- Message from Kia Wallis sent at 3/1/2021  9:26 AM EST -----  Regarding: Dr. Shruthi Marr Message/Vendor Calls    Caller's first and last name:      Reason for call: lab results       Callback required yes/no and why: yes       Best contact number(s): [phone]      Details to clarify the request: patient would like lab results mailed to her       Kia Wallis

## 2021-03-11 DIAGNOSIS — G43.809 OTHER MIGRAINE WITHOUT STATUS MIGRAINOSUS, NOT INTRACTABLE: ICD-10-CM

## 2021-03-11 RX ORDER — BUTALBITAL, ACETAMINOPHEN AND CAFFEINE 300; 40; 50 MG/1; MG/1; MG/1
CAPSULE ORAL
Qty: 20 CAP | Refills: 1 | Status: SHIPPED | OUTPATIENT
Start: 2021-03-11 | End: 2021-04-11

## 2021-03-17 DIAGNOSIS — F41.9 ANXIETY: ICD-10-CM

## 2021-03-17 RX ORDER — ALPRAZOLAM 0.5 MG/1
TABLET ORAL
Qty: 30 TAB | Refills: 0 | Status: SHIPPED | OUTPATIENT
Start: 2021-03-17 | End: 2021-05-04

## 2021-03-31 DIAGNOSIS — E78.5 HYPERLIPIDEMIA, UNSPECIFIED HYPERLIPIDEMIA TYPE: ICD-10-CM

## 2021-03-31 RX ORDER — SIMVASTATIN 10 MG/1
TABLET, FILM COATED ORAL
Qty: 90 TAB | Refills: 3 | Status: SHIPPED | OUTPATIENT
Start: 2021-03-31 | End: 2022-03-17

## 2021-04-11 DIAGNOSIS — G43.809 OTHER MIGRAINE WITHOUT STATUS MIGRAINOSUS, NOT INTRACTABLE: ICD-10-CM

## 2021-04-11 RX ORDER — BUTALBITAL, ACETAMINOPHEN AND CAFFEINE 300; 40; 50 MG/1; MG/1; MG/1
CAPSULE ORAL
Qty: 20 CAP | Refills: 1 | Status: SHIPPED | OUTPATIENT
Start: 2021-04-11 | End: 2021-05-02

## 2021-04-27 ENCOUNTER — VIRTUAL VISIT (OUTPATIENT)
Dept: INTERNAL MEDICINE CLINIC | Age: 66
End: 2021-04-27
Payer: MEDICARE

## 2021-04-27 DIAGNOSIS — R03.0 ELEVATED BP WITHOUT DIAGNOSIS OF HYPERTENSION: ICD-10-CM

## 2021-04-27 DIAGNOSIS — J32.9 SINUSITIS, UNSPECIFIED CHRONICITY, UNSPECIFIED LOCATION: Primary | ICD-10-CM

## 2021-04-27 PROCEDURE — 1101F PT FALLS ASSESS-DOCD LE1/YR: CPT | Performed by: INTERNAL MEDICINE

## 2021-04-27 PROCEDURE — G8427 DOCREV CUR MEDS BY ELIG CLIN: HCPCS | Performed by: INTERNAL MEDICINE

## 2021-04-27 PROCEDURE — G8510 SCR DEP NEG, NO PLAN REQD: HCPCS | Performed by: INTERNAL MEDICINE

## 2021-04-27 PROCEDURE — 3017F COLORECTAL CA SCREEN DOC REV: CPT | Performed by: INTERNAL MEDICINE

## 2021-04-27 PROCEDURE — G8419 CALC BMI OUT NRM PARAM NOF/U: HCPCS | Performed by: INTERNAL MEDICINE

## 2021-04-27 PROCEDURE — G8399 PT W/DXA RESULTS DOCUMENT: HCPCS | Performed by: INTERNAL MEDICINE

## 2021-04-27 PROCEDURE — 99213 OFFICE O/P EST LOW 20 MIN: CPT | Performed by: INTERNAL MEDICINE

## 2021-04-27 PROCEDURE — 1090F PRES/ABSN URINE INCON ASSESS: CPT | Performed by: INTERNAL MEDICINE

## 2021-04-27 PROCEDURE — G9899 SCRN MAM PERF RSLTS DOC: HCPCS | Performed by: INTERNAL MEDICINE

## 2021-04-27 PROCEDURE — G8536 NO DOC ELDER MAL SCRN: HCPCS | Performed by: INTERNAL MEDICINE

## 2021-04-27 RX ORDER — ALBUTEROL SULFATE 90 UG/1
AEROSOL, METERED RESPIRATORY (INHALATION)
COMMUNITY
End: 2021-11-26 | Stop reason: SDUPTHER

## 2021-04-27 RX ORDER — AMOXICILLIN AND CLAVULANATE POTASSIUM 875; 125 MG/1; MG/1
1 TABLET, FILM COATED ORAL EVERY 12 HOURS
Qty: 14 TAB | Refills: 0 | Status: SHIPPED | OUTPATIENT
Start: 2021-04-27 | End: 2021-05-04

## 2021-04-27 NOTE — PROGRESS NOTES
Shamar Saab was seen on 4/27/2021 using synchronous (real-time) audio-video technology; doxy. me. Consent: Shamar Saab, who was seen by synchronous (real-time) audio-video technology, and/or her healthcare decision maker, is aware that this patient-initiated, Telehealth encounter on 4/27/2021 is a billable service, with coverage as determined by her insurance carrier. She is aware that she may receive a bill and has provided verbal consent to proceed: Yes. I was in the office while conducting this encounter. Shamar Saab is a 74 y.o. female who presents today for Nasal Congestion, Nausea, and Headache  . She has a history of   Patient Active Problem List   Diagnosis Code    Hyperlipidemia E78.5    Gastroesophageal reflux disease K21.9    Allergic rhinitis J30.9    Anxiety F41.9   . Today patient is being seen for an acute visit. she does not have other concerns. Upper respiratory illness:  Shamar Saab presents with complaints of congestion, bilateral sinus pain and hoarseness for 3 days. nausea and no vomiting . she has not had  Fever but some chills. Symptoms are moderate. Over-the-counter remedies including Flonase. Rest.   Drinking plenty of fluids: yes  Asthma?:  no  non-smoker  Contacts with similar infections: no       ROS  Review of Systems   Constitutional: Positive for malaise/fatigue. Negative for chills, fever and weight loss. HENT: Positive for congestion, sinus pain and sore throat. Negative for ear discharge, ear pain, hearing loss, nosebleeds and tinnitus. Eyes: Negative for blurred vision, double vision and photophobia. Respiratory: Negative for cough, hemoptysis, sputum production, shortness of breath and stridor. Cardiovascular: Negative for chest pain, palpitations, orthopnea and leg swelling. Gastrointestinal: Negative for abdominal pain, constipation, diarrhea, heartburn, nausea and vomiting.    Genitourinary: Negative for dysuria, frequency and urgency. Musculoskeletal: Negative for back pain, joint pain, myalgias and neck pain. Skin: Negative for rash. Neurological: Positive for headaches. Negative for dizziness, tingling, tremors, sensory change and speech change. Endo/Heme/Allergies: Does not bruise/bleed easily. Psychiatric/Behavioral: Negative for memory loss and suicidal ideas. There were no vitals taken for this visit. Patient-Reported Vitals 4/27/2021   Patient-Reported Weight 165lbs   Patient-Reported Height -   Patient-Reported Pulse 73   Patient-Reported Temperature 97.8   Patient-Reported Systolic  328   Patient-Reported Diastolic 73        Physical Exam  Constitutional:       Comments: Appears ill   HENT:      Head: Normocephalic and atraumatic. Pulmonary:      Effort: Pulmonary effort is normal.   Neurological:      General: No focal deficit present. Mental Status: She is alert. Psychiatric:         Mood and Affect: Mood normal.         Behavior: Behavior normal.           Current Outpatient Medications   Medication Sig    albuterol (Ventolin HFA) 90 mcg/actuation inhaler Take  by inhalation.  amoxicillin-clavulanate (AUGMENTIN) 875-125 mg per tablet Take 1 Tab by mouth every twelve (12) hours for 7 days.  butalbital-acetaminophen-caff (FIORICET) -40 mg per capsule TAKE 1 CAPSULE BY MOUTH EVERY 6 HOURS AS NEEDED FOR PAIN    simvastatin (ZOCOR) 10 mg tablet TAKE 1 TABLET BY MOUTH EVERY DAY AT NIGHT    ALPRAZolam (XANAX) 0.5 mg tablet TAKE 1 TABLET BY MOUTH TWICE A DAY AS NEEDED FOR ANXIETY    acetaminophen (TylenoL) 325 mg tablet Tylenol 325 mg tablet   PO in office    ketorolac (ACULAR) 0.5 % ophthalmic solution ketorolac 0.5 % eye drops   INSTILL 1 DROP IN EYE 4 TIMES EVERY DAY INTO EYE HAVING SURGERY, START 2 DAYS PRIOR TO SURGERY    famotidine (PEPCID) 20 mg tablet Take 1 Tab by mouth nightly.     fluticasone propionate (Flonase) 50 mcg/actuation nasal spray 2 Sprays by Both Nostrils route daily.  vitamin e (E GEMS) 100 unit capsule Take  by mouth daily.  cholecalciferol, VITAMIN D3, (VITAMIN D3) 5,000 unit tab tablet Take 1 Tab by mouth two (2) times a day.  fexofenadine-pseudoephedrine (ALLEGRA-D 24 HOUR) 180-240 mg per tablet Take 1 Tab by mouth daily.  OTHER Stool softener    OTHER Juice plus     No current facility-administered medications for this visit. Past Medical History:   Diagnosis Date    Anxiety     Gluten intolerance     Hypercholesterolemia     Sinus problem       Past Surgical History:   Procedure Laterality Date    HX COLONOSCOPY      HX GYN      endometrial ablation    HX HEENT      wisdom teeth extraction    HX ORTHOPAEDIC Left     left wrist ulnar nerve transposition    HX SHOULDER ARTHROSCOPY Bilateral     HX TONSILLECTOMY      [place] REVAGINAL PROLAPSE,UTEROSACRAL        Social History     Tobacco Use    Smoking status: Never Smoker    Smokeless tobacco: Never Used   Substance Use Topics    Alcohol use: Not Currently     Comment: wine      Family History   Problem Relation Age of Onset    Diabetes Mother     Hypertension Mother     Celiac Disease Father     Celiac Disease Brother     Diabetes Maternal Grandmother     No Known Problems Brother     No Known Problems Brother         Allergies   Allergen Reactions    Aspirin Not Reported This Time    Bupropion Nausea and Vomiting    Floxin [Ofloxacin] Rash    Ibuprofen Swelling    Maxalt [Rizatriptan] Palpitations    Prevacid [Lansoprazole] Rash    Wellbutrin [Bupropion Hcl] Rash        Assessment/Plan  Diagnoses and all orders for this visit:    1. Sinusitis, unspecified chronicity, unspecified location-patient to continue symptomatic treatment. -     amoxicillin-clavulanate (AUGMENTIN) 875-125 mg per tablet; Take 1 Tab by mouth every twelve (12) hours for 7 days.     2. Elevated BP without diagnosis of hypertension-blood pressure is relatively well controlled while not feeling poorly. Ramirez Westfall is a 74 y.o. female being evaluated by a video visit encounter for concerns as above. A caregiver was present when appropriate. Due to this being a TeleHealth encounter (During VVLOS-94 public health emergency), evaluation of the following organ systems was limited: Vitals/Constitutional/EENT/Resp/CV/GI//MS/Neuro/Skin/Heme-Lymph-Imm. Pursuant to the emergency declaration under the [address] [place] [address], [facility] waiver authority and the [employer] Act, this Virtual  Visit was conducted, with patient's (and/or legal guardian's) consent, to reduce the patient's risk of exposure to COVID-19 and provide necessary medical care. Services were provided through a video synchronous discussion virtually to substitute for in-person clinic visit. Patient and provider were located at their individual homes. Kallie Boas, MD  4/27/2021    This note was created with the help of speech recognition software Barron Daly) and may contain some 'sound alike' errors.

## 2021-05-02 ENCOUNTER — TELEPHONE (OUTPATIENT)
Dept: INTERNAL MEDICINE CLINIC | Age: 66
End: 2021-05-02

## 2021-05-02 DIAGNOSIS — G43.809 OTHER MIGRAINE WITHOUT STATUS MIGRAINOSUS, NOT INTRACTABLE: ICD-10-CM

## 2021-05-02 RX ORDER — BUTALBITAL, ACETAMINOPHEN AND CAFFEINE 300; 40; 50 MG/1; MG/1; MG/1
CAPSULE ORAL
Qty: 20 CAP | Refills: 1 | Status: SHIPPED | OUTPATIENT
Start: 2021-05-02 | End: 2021-05-05

## 2021-05-02 NOTE — TELEPHONE ENCOUNTER
She had an appt with  - had augmentin and feels better but has burning in rectal area and so I told her tos top it as she only has one more day left and seems like she is starting a yeast like infection.  She doesn't want any meds and wants to watch it

## 2021-05-04 DIAGNOSIS — F41.9 ANXIETY: ICD-10-CM

## 2021-05-04 RX ORDER — ALPRAZOLAM 0.5 MG/1
TABLET ORAL
Qty: 30 TAB | Refills: 0 | Status: SHIPPED | OUTPATIENT
Start: 2021-05-04 | End: 2021-07-10

## 2021-05-05 DIAGNOSIS — G43.809 OTHER MIGRAINE WITHOUT STATUS MIGRAINOSUS, NOT INTRACTABLE: ICD-10-CM

## 2021-05-05 RX ORDER — BUTALBITAL, ACETAMINOPHEN AND CAFFEINE 300; 40; 50 MG/1; MG/1; MG/1
CAPSULE ORAL
Qty: 20 CAP | Refills: 1 | Status: SHIPPED | OUTPATIENT
Start: 2021-05-05 | End: 2021-05-06

## 2021-05-06 DIAGNOSIS — G43.809 OTHER MIGRAINE WITHOUT STATUS MIGRAINOSUS, NOT INTRACTABLE: ICD-10-CM

## 2021-05-06 RX ORDER — BUTALBITAL, ACETAMINOPHEN AND CAFFEINE 300; 40; 50 MG/1; MG/1; MG/1
CAPSULE ORAL
Qty: 20 CAP | Refills: 1 | Status: SHIPPED | OUTPATIENT
Start: 2021-05-06 | End: 2021-06-01

## 2021-06-01 DIAGNOSIS — G43.809 OTHER MIGRAINE WITHOUT STATUS MIGRAINOSUS, NOT INTRACTABLE: ICD-10-CM

## 2021-06-01 RX ORDER — BUTALBITAL, ACETAMINOPHEN AND CAFFEINE 300; 40; 50 MG/1; MG/1; MG/1
CAPSULE ORAL
Qty: 20 CAPSULE | Refills: 1 | Status: SHIPPED | OUTPATIENT
Start: 2021-06-01 | End: 2021-06-24

## 2021-06-24 DIAGNOSIS — G43.809 OTHER MIGRAINE WITHOUT STATUS MIGRAINOSUS, NOT INTRACTABLE: ICD-10-CM

## 2021-06-24 RX ORDER — BUTALBITAL, ACETAMINOPHEN AND CAFFEINE 300; 40; 50 MG/1; MG/1; MG/1
CAPSULE ORAL
Qty: 20 CAPSULE | Refills: 1 | Status: SHIPPED | OUTPATIENT
Start: 2021-06-24 | End: 2021-07-17

## 2021-07-10 DIAGNOSIS — F41.9 ANXIETY: ICD-10-CM

## 2021-07-10 RX ORDER — ALPRAZOLAM 0.5 MG/1
TABLET ORAL
Qty: 30 TABLET | Refills: 0 | Status: SHIPPED | OUTPATIENT
Start: 2021-07-10 | End: 2021-09-24 | Stop reason: SDUPTHER

## 2021-07-17 DIAGNOSIS — G43.809 OTHER MIGRAINE WITHOUT STATUS MIGRAINOSUS, NOT INTRACTABLE: ICD-10-CM

## 2021-07-17 RX ORDER — BUTALBITAL, ACETAMINOPHEN AND CAFFEINE 300; 40; 50 MG/1; MG/1; MG/1
CAPSULE ORAL
Qty: 20 CAPSULE | Refills: 1 | Status: SHIPPED | OUTPATIENT
Start: 2021-07-17 | End: 2021-08-12

## 2021-08-09 RX ORDER — CEPHRADINE 500 MG
1 CAPSULE ORAL DAILY
Qty: 90 CAPSULE | Refills: 1 | Status: CANCELLED | OUTPATIENT
Start: 2021-08-09

## 2021-08-12 DIAGNOSIS — G43.809 OTHER MIGRAINE WITHOUT STATUS MIGRAINOSUS, NOT INTRACTABLE: ICD-10-CM

## 2021-08-12 RX ORDER — BUTALBITAL, ACETAMINOPHEN AND CAFFEINE 300; 40; 50 MG/1; MG/1; MG/1
CAPSULE ORAL
Qty: 20 CAPSULE | Refills: 1 | Status: SHIPPED | OUTPATIENT
Start: 2021-08-12 | End: 2021-09-14

## 2021-08-18 ENCOUNTER — TELEPHONE (OUTPATIENT)
Dept: INTERNAL MEDICINE CLINIC | Age: 66
End: 2021-08-18

## 2021-08-18 NOTE — TELEPHONE ENCOUNTER
OLIVER:  ----- Message from Merlyn St sent at 8/17/2021  5:00 PM EDT -----  Regarding: Dr. Coker Snowball  Appointment not available    Caller's first and last name and relationship to patient (if not the patient): Pt       Best contact number: [phone]      Preferred date and time: 08/17/21      Scheduled appointment date and time: N/A       Reason for appointment: sinus infection      Details to clarify the request: Tested negative for Covid.        Merlyn St

## 2021-08-19 ENCOUNTER — OFFICE VISIT (OUTPATIENT)
Dept: INTERNAL MEDICINE CLINIC | Age: 66
End: 2021-08-19
Payer: MEDICARE

## 2021-08-19 VITALS
SYSTOLIC BLOOD PRESSURE: 172 MMHG | HEIGHT: 64 IN | DIASTOLIC BLOOD PRESSURE: 76 MMHG | BODY MASS INDEX: 27.96 KG/M2 | HEART RATE: 69 BPM | OXYGEN SATURATION: 97 % | RESPIRATION RATE: 16 BRPM | TEMPERATURE: 98.4 F | WEIGHT: 163.8 LBS

## 2021-08-19 DIAGNOSIS — R51.9 NONINTRACTABLE HEADACHE, UNSPECIFIED CHRONICITY PATTERN, UNSPECIFIED HEADACHE TYPE: ICD-10-CM

## 2021-08-19 DIAGNOSIS — J32.9 SINUSITIS, UNSPECIFIED CHRONICITY, UNSPECIFIED LOCATION: Primary | ICD-10-CM

## 2021-08-19 PROCEDURE — G8419 CALC BMI OUT NRM PARAM NOF/U: HCPCS | Performed by: INTERNAL MEDICINE

## 2021-08-19 PROCEDURE — 3017F COLORECTAL CA SCREEN DOC REV: CPT | Performed by: INTERNAL MEDICINE

## 2021-08-19 PROCEDURE — G8536 NO DOC ELDER MAL SCRN: HCPCS | Performed by: INTERNAL MEDICINE

## 2021-08-19 PROCEDURE — 99213 OFFICE O/P EST LOW 20 MIN: CPT | Performed by: INTERNAL MEDICINE

## 2021-08-19 PROCEDURE — G8399 PT W/DXA RESULTS DOCUMENT: HCPCS | Performed by: INTERNAL MEDICINE

## 2021-08-19 PROCEDURE — G8432 DEP SCR NOT DOC, RNG: HCPCS | Performed by: INTERNAL MEDICINE

## 2021-08-19 PROCEDURE — G9899 SCRN MAM PERF RSLTS DOC: HCPCS | Performed by: INTERNAL MEDICINE

## 2021-08-19 PROCEDURE — 1090F PRES/ABSN URINE INCON ASSESS: CPT | Performed by: INTERNAL MEDICINE

## 2021-08-19 PROCEDURE — 1101F PT FALLS ASSESS-DOCD LE1/YR: CPT | Performed by: INTERNAL MEDICINE

## 2021-08-19 PROCEDURE — G8427 DOCREV CUR MEDS BY ELIG CLIN: HCPCS | Performed by: INTERNAL MEDICINE

## 2021-08-19 RX ORDER — PREDNISONE 20 MG/1
40 TABLET ORAL
Qty: 10 TABLET | Refills: 0 | Status: SHIPPED | OUTPATIENT
Start: 2021-08-19 | End: 2021-08-24

## 2021-08-19 RX ORDER — AMOXICILLIN AND CLAVULANATE POTASSIUM 875; 125 MG/1; MG/1
TABLET, FILM COATED ORAL
COMMUNITY
End: 2021-08-19

## 2021-08-19 RX ORDER — CEFDINIR 300 MG/1
300 CAPSULE ORAL 2 TIMES DAILY
Qty: 14 CAPSULE | Refills: 0 | Status: SHIPPED | OUTPATIENT
Start: 2021-08-19 | End: 2021-08-26

## 2021-08-19 RX ORDER — DOXYCYCLINE 100 MG/1
CAPSULE ORAL
COMMUNITY
Start: 2021-08-14 | End: 2021-08-19 | Stop reason: ALTCHOICE

## 2021-08-19 NOTE — PATIENT INSTRUCTIONS
Sinusitis: Care Instructions  Your Care Instructions     Sinusitis is an infection of the lining of the sinus cavities in your head. Sinusitis often follows a cold. It causes pain and pressure in your head and face. In most cases, sinusitis gets better on its own in 1 to 2 weeks. But some mild symptoms may last for several weeks. Sometimes antibiotics are needed. Follow-up care is a key part of your treatment and safety. Be sure to make and go to all appointments, and call your doctor if you are having problems. It's also a good idea to know your test results and keep a list of the medicines you take. How can you care for yourself at home? · Take an over-the-counter pain medicine, such as acetaminophen (Tylenol), ibuprofen (Advil, Motrin), or naproxen (Aleve). Read and follow all instructions on the label. · If the doctor prescribed antibiotics, take them as directed. Do not stop taking them just because you feel better. You need to take the full course of antibiotics. · Be careful when taking over-the-counter cold or flu medicines and Tylenol at the same time. Many of these medicines have acetaminophen, which is Tylenol. Read the labels to make sure that you are not taking more than the recommended dose. Too much acetaminophen (Tylenol) can be harmful. · Breathe warm, moist air from a steamy shower, a hot bath, or a sink filled with hot water. Avoid cold, dry air. Using a humidifier in your home may help. Follow the directions for cleaning the machine. · Use saline (saltwater) nasal washes. This can help keep your nasal passages open and wash out mucus and bacteria. You can buy saline nose drops at a grocery store or drugstore. Or you can make your own at home by adding 1 teaspoon of salt and 1 teaspoon of baking soda to 2 cups of distilled water. If you make your own, fill a bulb syringe with the solution, insert the tip into your nostril, and squeeze gently. Cristy Gee your nose.   · Put a hot, wet towel or a warm gel pack on your face 3 or 4 times a day for 5 to 10 minutes each time. · Try a decongestant nasal spray like oxymetazoline (Afrin). Do not use it for more than 3 days in a row. Using it for more than 3 days can make your congestion worse. When should you call for help? Call your doctor now or seek immediate medical care if:    · You have new or worse swelling or redness in your face or around your eyes.     · You have a new or higher fever. Watch closely for changes in your health, and be sure to contact your doctor if:    · You have new or worse facial pain.     · The mucus from your nose becomes thicker (like pus) or has new blood in it.     · You are not getting better as expected. Where can you learn more? Go to http://www.gray.com/  Enter G615 in the search box to learn more about \"Sinusitis: Care Instructions. \"  Current as of: December 2, 2020               Content Version: 12.8  © 2006-2021 [employer]wise[employer] Incorporated. Care instructions adapted under license by [employer] (which disclaims liability or warranty for this information). If you have questions about a medical condition or this instruction, always ask your healthcare professional. [place] [address] any warranty or liability for your use of this information.

## 2021-08-19 NOTE — PROGRESS NOTES
Latisha Hay is a 74 y.o. female who presents today for Sinus Infection (RM19// pt presents today for sinus infection was seen at [facility] last week tested for covid (-) was given meds stopped due to having headaches)  . She has a history of   Patient Active Problem List   Diagnosis Code    Hyperlipidemia E78.5    Gastroesophageal reflux disease K21.9    Allergic rhinitis J30.9    Anxiety F41.9   . Today patient is here for an acute visit. Upper respiratory illness:  Latisha Hay presents with complaints of congestion, sore throat, headache and bilateral sinus pain for 2 weeks. no nausea and no vomiting . she has not had  fever and chills. Seen at urgent care, given abx, then switched after 3 days. Stopped after 2 days due to H/A. Tested for Covid and this was negative. She has taken both Augmentin and doxycycline in the past.  Drinking plenty of fluids: yes  Asthma?:  no  non-smoker  Contacts with similar infections: no   Taking allergy medication. ROS  Review of Systems   Constitutional: Positive for malaise/fatigue. Negative for chills, fever and weight loss. HENT: Positive for congestion, sinus pain and sore throat. Eyes: Negative for blurred vision, double vision and photophobia. Respiratory: Negative for cough, hemoptysis, sputum production, shortness of breath and wheezing. Cardiovascular: Negative for chest pain, palpitations, orthopnea, claudication and leg swelling. Gastrointestinal: Negative for abdominal pain, constipation, diarrhea, heartburn, nausea and vomiting. Genitourinary: Negative for dysuria, frequency and urgency. Musculoskeletal: Negative for joint pain and myalgias. Skin: Negative for rash. Neurological: Negative. Negative for headaches. Endo/Heme/Allergies: Does not bruise/bleed easily. Psychiatric/Behavioral: Negative for memory loss and suicidal ideas.        Visit Vitals  BP (!) 172/76 (BP 1 Location: Left upper arm, BP Patient Position: Sitting, BP Cuff Size: Adult)   Pulse 69   Temp 98.4 °F (36.9 °C) (Oral)   Resp 16   Ht 5' 4\" (1.626 m)   Wt 163 lb 12.8 oz (74.3 kg)   SpO2 97%   BMI 28.12 kg/m²       Physical Exam  Constitutional:       General: She is not in acute distress. Appearance: She is well-developed. HENT:      Head: Normocephalic and atraumatic. Right Ear: Ear canal normal.      Left Ear: Ear canal normal.      Ears:      Comments: Pressure to both TM. Mouth/Throat:      Mouth: Mucous membranes are moist.   Neck:      Thyroid: No thyromegaly. Cardiovascular:      Rate and Rhythm: Normal rate and regular rhythm. Heart sounds: No murmur heard. Pulmonary:      Effort: Pulmonary effort is normal.      Breath sounds: Normal breath sounds. No wheezing. Abdominal:      General: Bowel sounds are normal. There is no distension. Palpations: Abdomen is soft. Musculoskeletal:      Cervical back: Normal range of motion and neck supple. Skin:     General: Skin is warm and dry. Neurological:      Mental Status: She is alert and oriented to person, place, and time. Cranial Nerves: No cranial nerve deficit. Psychiatric:         Behavior: Behavior normal.           Current Outpatient Medications   Medication Sig    butalbital-acetaminophen-caff (FIORICET) -40 mg per capsule TAKE 1 CAPSULE BY MOUTH EVERY 6 HOURS AS NEEDED FOR PAIN    ALPRAZolam (XANAX) 0.5 mg tablet TAKE 1 TABLET BY MOUTH TWICE A DAY AS NEEDED FOR ANXIETY    albuterol (Ventolin HFA) 90 mcg/actuation inhaler Take  by inhalation.  simvastatin (ZOCOR) 10 mg tablet TAKE 1 TABLET BY MOUTH EVERY DAY AT NIGHT    acetaminophen (TylenoL) 325 mg tablet Tylenol 325 mg tablet   PO in office    famotidine (PEPCID) 20 mg tablet Take 1 Tab by mouth nightly.  fluticasone propionate (Flonase) 50 mcg/actuation nasal spray 2 Sprays by Both Nostrils route daily.  vitamin e (E GEMS) 100 unit capsule Take  by mouth daily.     cholecalciferol, VITAMIN D3, (VITAMIN D3) 5,000 unit tab tablet Take 1 Tab by mouth two (2) times a day.  OTHER Juice plus    OTHER Stool softener    doxycycline (VIBRAMYCIN) 100 mg capsule TAKE 1 CAPSULE BY MOUTH TWICE A DAY FOR 10 DAYS (Patient not taking: Reported on 8/19/2021)    amoxicillin-clavulanate (Augmentin) 875-125 mg per tablet Augmentin 875 mg-125 mg tablet   Take 1 tablet every 12 hours by oral route for 10 days. (Patient not taking: Reported on 8/19/2021)    ketorolac (ACULAR) 0.5 % ophthalmic solution ketorolac 0.5 % eye drops   INSTILL 1 DROP IN EYE 4 TIMES EVERY DAY INTO EYE HAVING SURGERY, START 2 DAYS PRIOR TO SURGERY (Patient not taking: Reported on 8/19/2021)    fexofenadine-pseudoephedrine (ALLEGRA-D 24 HOUR) 180-240 mg per tablet Take 1 Tab by mouth daily. (Patient not taking: Reported on 8/19/2021)     No current facility-administered medications for this visit.         Past Medical History:   Diagnosis Date    Anxiety     Gluten intolerance     Hypercholesterolemia     Sinus problem       Past Surgical History:   Procedure Laterality Date    HX COLONOSCOPY      HX GYN      endometrial ablation    HX HEENT      wisdom teeth extraction    HX ORTHOPAEDIC Left     left wrist ulnar nerve transposition    HX SHOULDER ARTHROSCOPY Bilateral     HX TONSILLECTOMY      [place] REVAGINAL PROLAPSE,UTEROSACRAL        Social History     Tobacco Use    Smoking status: Never Smoker    Smokeless tobacco: Never Used   Substance Use Topics    Alcohol use: Not Currently     Comment: wine      Family History   Problem Relation Age of Onset    Diabetes Mother     Hypertension Mother     Celiac Disease Father     Celiac Disease Brother     Diabetes Maternal Grandmother     No Known Problems Brother     No Known Problems Brother         Allergies   Allergen Reactions    Aspirin Not Reported This Time    Bupropion Nausea and Vomiting    Floxin [Ofloxacin] Rash    Ibuprofen Swelling    Maxalt [Rizatriptan] Palpitations    Prevacid [Lansoprazole] Rash    Wellbutrin [Bupropion Hcl] Rash        Assessment/Plan  Diagnoses and all orders for this visit:    1. Sinusitis, unspecified chronicity, unspecified location-unclear why she developed headaches after starting these antibiotics that she has been on these before. Will place her on Omnicef for 7 days along with prednisone. If headaches persist low threshold for imaging.  -     cefdinir (OMNICEF) 300 mg capsule; Take 1 Capsule by mouth two (2) times a day for 7 days. -     predniSONE (DELTASONE) 20 mg tablet; Take 40 mg by mouth daily (with breakfast) for 5 days. 2. Nonintractable headache, unspecified chronicity pattern, unspecified headache type            Radha Lema MD  8/19/2021    This note was created with the help of speech recognition software Jose Segal) and may contain some 'sound alike' errors.

## 2021-08-19 NOTE — PROGRESS NOTES
Paarg Alvarez  Identified pt with two pt identifiers(name and ). Chief Complaint   Patient presents with    Sinus Infection     RM19// pt presents today for sinus infection was seen at [facility] last week tested for covid (-) was given meds stopped due to having headaches       1. Have you been to the ER, urgent care clinic since your last visit? Hospitalized since your last visit? NO    2. Have you seen or consulted any other health care providers outside of the [address] [place] [address] since your last visit? Include any pap smears or colon screening. NO      Provider notified of reason for visit, vitals and flowsheets obtained on patients.      Patient received paperwork for advance directive during previous visit but has not completed at this time     Reviewed record In preparation for visit, huddled with provider and have obtained necessary documentation      Health Maintenance Due   Topic    COVID-19 Vaccine (1)    DTaP/Tdap/Td series (2 - Td or Tdap)       Wt Readings from Last 3 Encounters:   21 163 lb 12.8 oz (74.3 kg)   21 158 lb (71.7 kg)   10/14/20 165 lb 9.6 oz (75.1 kg)     Temp Readings from Last 3 Encounters:   21 98.4 °F (36.9 °C) (Oral)   21 98.1 °F (36.7 °C) (Oral)   10/14/20 98.8 °F (37.1 °C) (Oral)     BP Readings from Last 3 Encounters:   21 (!) 172/76   21 120/72   10/14/20 132/82     Pulse Readings from Last 3 Encounters:   21 69   21 74   10/14/20 73     Vitals:    21 1112   BP: (!) 172/76   Pulse: 69   Resp: 16   Temp: 98.4 °F (36.9 °C)   TempSrc: Oral   SpO2: 97%   Weight: 163 lb 12.8 oz (74.3 kg)   Height: 5' 4\" (1.626 m)   PainSc:   0 - No pain         Learning Assessment:  :     Learning Assessment 10/14/2020   PRIMARY LEARNER Patient   HIGHEST LEVEL OF EDUCATION - PRIMARY LEARNER  [employer]   BARRIERS PRIMARY LEARNER NONE   CO-LEARNER CAREGIVER No   PRIMARY LANGUAGE ENGLISH   LEARNER PREFERENCE PRIMARY OTHER (COMMENT) ANSWERED BY patient    RELATIONSHIP SELF       Depression Screening:  :     3 most recent PHQ Screens 4/27/2021   Little interest or pleasure in doing things Not at all   Feeling down, depressed, irritable, or hopeless Not at all   Total Score PHQ 2 0   Trouble falling or staying asleep, or sleeping too much -   Feeling tired or having little energy -   Poor appetite, weight loss, or overeating -   Feeling bad about yourself - or that you are a failure or have let yourself or your family down -   Trouble concentrating on things such as school, work, reading, or watching TV -   Moving or speaking so slowly that other people could have noticed; or the opposite being so fidgety that others notice -   Thoughts of being better off dead, or hurting yourself in some way -   PHQ 9 Score -   How difficult have these problems made it for you to do your work, take care of your home and get along with others -       Fall Risk Assessment:  :     Fall Risk Assessment, last 12 mths 4/27/2021   Able to walk? Yes   Fall in past 12 months? 0   Do you feel unsteady? 0   Are you worried about falling 0       Abuse Screening:  :     Abuse Screening Questionnaire 4/27/2021 2/24/2021 4/30/2020 4/7/2020 10/3/2019 12/4/2018 8/8/2018   Do you ever feel afraid of your partner? N N N N N N N   Are you in a relationship with someone who physically or mentally threatens you? N N N N N N N   Is it safe for you to go home? Y Y Y Y Y Y Y       ADL Screening:  :     No flowsheet data found. Medication reconciliation up to date and corrected with patient at this time.

## 2021-08-23 DIAGNOSIS — Z20.822 ENCOUNTER FOR SCREENING LABORATORY TESTING FOR COVID-19 VIRUS: Primary | ICD-10-CM

## 2021-08-24 LAB — DIASORIN SARS-COV-2 AB, IGG, RCVG3T: POSITIVE

## 2021-08-24 NOTE — PROGRESS NOTES
Please inform patient that her Covid antibodies are positive indicating previous infection. Having said that current guidelines would still recommend vaccination with one of the 3 available vaccines. You can mail her her results if she would like to have them.

## 2021-08-25 NOTE — PROGRESS NOTES
Called pt to inform that her result is positive and we still encourage her to get the covid vaccine. Pt voiced understanding and will try her best to get vaccinated. Pt requested for the result to be mailed to her home.

## 2021-09-14 DIAGNOSIS — G43.809 OTHER MIGRAINE WITHOUT STATUS MIGRAINOSUS, NOT INTRACTABLE: ICD-10-CM

## 2021-09-14 RX ORDER — BUTALBITAL, ACETAMINOPHEN AND CAFFEINE 300; 40; 50 MG/1; MG/1; MG/1
CAPSULE ORAL
Qty: 20 CAPSULE | Refills: 1 | Status: SHIPPED | OUTPATIENT
Start: 2021-09-14 | End: 2021-10-10

## 2021-09-24 DIAGNOSIS — F41.9 ANXIETY: ICD-10-CM

## 2021-09-24 RX ORDER — ALPRAZOLAM 0.5 MG/1
TABLET ORAL
Qty: 30 TABLET | Refills: 0 | Status: SHIPPED | OUTPATIENT
Start: 2021-09-24 | End: 2021-12-05

## 2021-10-08 DIAGNOSIS — G43.809 OTHER MIGRAINE WITHOUT STATUS MIGRAINOSUS, NOT INTRACTABLE: ICD-10-CM

## 2021-10-10 RX ORDER — BUTALBITAL, ACETAMINOPHEN AND CAFFEINE 300; 40; 50 MG/1; MG/1; MG/1
CAPSULE ORAL
Qty: 20 CAPSULE | Refills: 1 | Status: SHIPPED | OUTPATIENT
Start: 2021-10-10 | End: 2021-11-05

## 2021-11-05 DIAGNOSIS — G43.809 OTHER MIGRAINE WITHOUT STATUS MIGRAINOSUS, NOT INTRACTABLE: ICD-10-CM

## 2021-11-05 RX ORDER — BUTALBITAL, ACETAMINOPHEN AND CAFFEINE 300; 40; 50 MG/1; MG/1; MG/1
CAPSULE ORAL
Qty: 20 CAPSULE | Refills: 1 | Status: SHIPPED | OUTPATIENT
Start: 2021-11-05 | End: 2021-12-06

## 2021-11-26 RX ORDER — FLUTICASONE PROPIONATE 50 MCG
2 SPRAY, SUSPENSION (ML) NASAL DAILY
Qty: 3 EACH | Refills: 1 | Status: SHIPPED | OUTPATIENT
Start: 2021-11-26 | End: 2022-05-16

## 2021-11-26 RX ORDER — ALBUTEROL SULFATE 90 UG/1
1 AEROSOL, METERED RESPIRATORY (INHALATION)
Qty: 18 G | Refills: 5 | Status: SHIPPED | OUTPATIENT
Start: 2021-11-26

## 2021-11-26 NOTE — TELEPHONE ENCOUNTER
Patient also needs albuterol refilled. She has not gotten any medications refilled yet that were requested on 11/24.

## 2021-12-04 DIAGNOSIS — F41.9 ANXIETY: ICD-10-CM

## 2021-12-05 RX ORDER — ALPRAZOLAM 0.5 MG/1
TABLET ORAL
Qty: 30 TABLET | Refills: 0 | Status: SHIPPED | OUTPATIENT
Start: 2021-12-05 | End: 2022-03-17

## 2021-12-06 DIAGNOSIS — G43.809 OTHER MIGRAINE WITHOUT STATUS MIGRAINOSUS, NOT INTRACTABLE: ICD-10-CM

## 2021-12-06 RX ORDER — BUTALBITAL, ACETAMINOPHEN AND CAFFEINE 300; 40; 50 MG/1; MG/1; MG/1
CAPSULE ORAL
Qty: 20 CAPSULE | Refills: 1 | Status: SHIPPED | OUTPATIENT
Start: 2021-12-06 | End: 2021-12-23

## 2021-12-22 DIAGNOSIS — G43.809 OTHER MIGRAINE WITHOUT STATUS MIGRAINOSUS, NOT INTRACTABLE: ICD-10-CM

## 2021-12-23 RX ORDER — BUTALBITAL, ACETAMINOPHEN AND CAFFEINE 300; 40; 50 MG/1; MG/1; MG/1
CAPSULE ORAL
Qty: 20 CAPSULE | Refills: 1 | Status: SHIPPED | OUTPATIENT
Start: 2021-12-23 | End: 2022-01-15

## 2022-01-15 DIAGNOSIS — G43.809 OTHER MIGRAINE WITHOUT STATUS MIGRAINOSUS, NOT INTRACTABLE: ICD-10-CM

## 2022-01-15 RX ORDER — BUTALBITAL, ACETAMINOPHEN AND CAFFEINE 300; 40; 50 MG/1; MG/1; MG/1
CAPSULE ORAL
Qty: 20 CAPSULE | Refills: 1 | Status: SHIPPED | OUTPATIENT
Start: 2022-01-15 | End: 2022-02-03

## 2022-02-01 ENCOUNTER — TELEPHONE (OUTPATIENT)
Dept: INTERNAL MEDICINE CLINIC | Age: 67
End: 2022-02-01

## 2022-02-01 NOTE — TELEPHONE ENCOUNTER
Patients daughter was returning nurses call - nurse has gone for day so she said she would wait for nurse to return call when possible

## 2022-02-01 NOTE — TELEPHONE ENCOUNTER
----- Message from Tre Nixon sent at 1/31/2022  4:54 PM EST -----  Subject: Message to Provider    QUESTIONS  Information for Provider? pts daughter is having a few concerns regarding   her mothers memory loss she has been experiencing and also wants to know   about scheduling an appt for care and what is expected when coming in. Please give her a call when available   ---------------------------------------------------------------------------  --------------  CALL BACK INFO  What is the best way for the office to contact you? OK to leave message on   voicemail  Preferred Call Back Phone Number? [phone]  ---------------------------------------------------------------------------  --------------  SCRIPT ANSWERS  Relationship to Patient?  Third Party  Representative Name? Isma Sherwood

## 2022-02-02 ENCOUNTER — TELEPHONE (OUTPATIENT)
Dept: INTERNAL MEDICINE CLINIC | Age: 67
End: 2022-02-02

## 2022-02-02 NOTE — TELEPHONE ENCOUNTER
Daughter reports pt's worsening memory loss, had a urinary accident a couple of weeks ago.  Pt was admitted at [facility] last month and broke her wrist. appt scheduled for ROSALINDA BOURNE and advised daughter to bring notes from pt's hand

## 2022-02-03 DIAGNOSIS — G43.809 OTHER MIGRAINE WITHOUT STATUS MIGRAINOSUS, NOT INTRACTABLE: ICD-10-CM

## 2022-02-03 RX ORDER — BUTALBITAL, ACETAMINOPHEN AND CAFFEINE 300; 40; 50 MG/1; MG/1; MG/1
CAPSULE ORAL
Qty: 20 CAPSULE | Refills: 1 | Status: SHIPPED | OUTPATIENT
Start: 2022-02-03 | End: 2022-03-04

## 2022-02-04 ENCOUNTER — TELEPHONE (OUTPATIENT)
Dept: INTERNAL MEDICINE CLINIC | Age: 67
End: 2022-02-04

## 2022-02-04 NOTE — TELEPHONE ENCOUNTER
LVM to update pt's current insurance info as we have received a fax from [employer] instead of [employer] (in our system). We will need her member id, Manny Bains, [address] and [employer] to submit a PA for her Fioricet.

## 2022-02-07 ENCOUNTER — TELEPHONE (OUTPATIENT)
Dept: INTERNAL MEDICINE CLINIC | Age: 67
End: 2022-02-07
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
## 2022-02-07 NOTE — TELEPHONE ENCOUNTER
PA for fioricet submitted and has received an approval. Pt informed and was thankful for the assistance. ----- Message from Eulogio Jose G sent at 2/5/2022 11:53 AM EST -----  Subject: Message to Provider    QUESTIONS  Information for Provider? pt called to update insurance info: member id? [id number], Leidy Armijo? [id number], rxpcn? IS or 1S, rxgroup is [id number]. please   reach out to pt once pa is done. pt will bring insurance card to next   visit.   ---------------------------------------------------------------------------  --------------  CALL BACK INFO  What is the best way for the office to contact you? OK to leave message on   voicemail  Preferred Call Back Phone Number? [phone]  ---------------------------------------------------------------------------  --------------  SCRIPT ANSWERS  Relationship to Patient?  Self

## 2022-02-08 ENCOUNTER — OFFICE VISIT (OUTPATIENT)
Dept: INTERNAL MEDICINE CLINIC | Age: 67
End: 2022-02-08
Payer: MEDICARE

## 2022-02-08 VITALS
RESPIRATION RATE: 16 BRPM | OXYGEN SATURATION: 95 % | HEIGHT: 64 IN | SYSTOLIC BLOOD PRESSURE: 125 MMHG | HEART RATE: 73 BPM | WEIGHT: 169.6 LBS | BODY MASS INDEX: 28.95 KG/M2 | DIASTOLIC BLOOD PRESSURE: 75 MMHG | TEMPERATURE: 98.2 F

## 2022-02-08 DIAGNOSIS — E55.9 VITAMIN D DEFICIENCY: ICD-10-CM

## 2022-02-08 DIAGNOSIS — F41.9 ANXIETY: Primary | ICD-10-CM

## 2022-02-08 DIAGNOSIS — R03.0 ELEVATED BP WITHOUT DIAGNOSIS OF HYPERTENSION: ICD-10-CM

## 2022-02-08 DIAGNOSIS — E78.5 HYPERLIPIDEMIA, UNSPECIFIED HYPERLIPIDEMIA TYPE: ICD-10-CM

## 2022-02-08 DIAGNOSIS — R41.3 MEMORY CHANGES: ICD-10-CM

## 2022-02-08 DIAGNOSIS — S62.339S CLOSED BOXER'S FRACTURE, SEQUELA: ICD-10-CM

## 2022-02-08 PROCEDURE — 99214 OFFICE O/P EST MOD 30 MIN: CPT | Performed by: INTERNAL MEDICINE

## 2022-02-08 RX ORDER — CHOLECALCIFEROL (VITAMIN D3) 125 MCG
CAPSULE ORAL
COMMUNITY
End: 2022-02-08

## 2022-02-08 RX ORDER — GUAIFENESIN 600 MG/1
600 TABLET, EXTENDED RELEASE ORAL 2 TIMES DAILY
COMMUNITY
End: 2022-07-06 | Stop reason: ALTCHOICE

## 2022-02-08 NOTE — PATIENT INSTRUCTIONS
[facility] at [address], [facility], [address]    [phone]
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
## 2022-02-09 LAB
25(OH)D3+25(OH)D2 SERPL-MCNC: 38.1 NG/ML (ref 30–100)
ALBUMIN SERPL-MCNC: 4.7 G/DL (ref 3.8–4.8)
ALBUMIN/GLOB SERPL: 2 {RATIO} (ref 1.2–2.2)
ALP SERPL-CCNC: 97 IU/L (ref 44–121)
ALT SERPL-CCNC: 26 IU/L (ref 0–32)
AST SERPL-CCNC: 44 IU/L (ref 0–40)
BASOPHILS # BLD AUTO: 0 X10E3/UL (ref 0–0.2)
BASOPHILS NFR BLD AUTO: 0 %
BILIRUB SERPL-MCNC: 0.5 MG/DL (ref 0–1.2)
BUN SERPL-MCNC: 11 MG/DL (ref 8–27)
BUN/CREAT SERPL: 11 (ref 12–28)
CALCIUM SERPL-MCNC: 9.6 MG/DL (ref 8.7–10.3)
CHLORIDE SERPL-SCNC: 104 MMOL/L (ref 96–106)
CHOLEST SERPL-MCNC: 188 MG/DL (ref 100–199)
CO2 SERPL-SCNC: 23 MMOL/L (ref 20–29)
CREAT SERPL-MCNC: 0.99 MG/DL (ref 0.57–1)
EOSINOPHIL # BLD AUTO: 0.1 X10E3/UL (ref 0–0.4)
EOSINOPHIL NFR BLD AUTO: 1 %
ERYTHROCYTE [DISTWIDTH] IN BLOOD BY AUTOMATED COUNT: 13.9 % (ref 11.7–15.4)
GLOBULIN SER CALC-MCNC: 2.4 G/DL (ref 1.5–4.5)
GLUCOSE SERPL-MCNC: 97 MG/DL (ref 65–99)
HCT VFR BLD AUTO: 44 % (ref 34–46.6)
HDLC SERPL-MCNC: 53 MG/DL
HGB BLD-MCNC: 14.2 G/DL (ref 11.1–15.9)
IMM GRANULOCYTES # BLD AUTO: 0 X10E3/UL (ref 0–0.1)
IMM GRANULOCYTES NFR BLD AUTO: 0 %
IMP & REVIEW OF LAB RESULTS: NORMAL
INTERPRETATION: NORMAL
LDLC SERPL CALC-MCNC: 107 MG/DL (ref 0–99)
LYMPHOCYTES # BLD AUTO: 1.3 X10E3/UL (ref 0.7–3.1)
LYMPHOCYTES NFR BLD AUTO: 23 %
MCH RBC QN AUTO: 28.9 PG (ref 26.6–33)
MCHC RBC AUTO-ENTMCNC: 32.3 G/DL (ref 31.5–35.7)
MCV RBC AUTO: 89 FL (ref 79–97)
MONOCYTES # BLD AUTO: 0.6 X10E3/UL (ref 0.1–0.9)
MONOCYTES NFR BLD AUTO: 11 %
NEUTROPHILS # BLD AUTO: 3.6 X10E3/UL (ref 1.4–7)
NEUTROPHILS NFR BLD AUTO: 65 %
PLATELET # BLD AUTO: 287 X10E3/UL (ref 150–450)
POTASSIUM SERPL-SCNC: 4.2 MMOL/L (ref 3.5–5.2)
PROT SERPL-MCNC: 7.1 G/DL (ref 6–8.5)
RBC # BLD AUTO: 4.92 X10E6/UL (ref 3.77–5.28)
SODIUM SERPL-SCNC: 142 MMOL/L (ref 134–144)
TRIGL SERPL-MCNC: 160 MG/DL (ref 0–149)
VLDLC SERPL CALC-MCNC: 28 MG/DL (ref 5–40)
WBC # BLD AUTO: 5.5 X10E3/UL (ref 3.4–10.8)

## 2022-02-21 ENCOUNTER — TELEPHONE (OUTPATIENT)
Dept: INTERNAL MEDICINE CLINIC | Age: 67
End: 2022-02-21

## 2022-02-21 DIAGNOSIS — R41.3 MEMORY CHANGES: Primary | ICD-10-CM

## 2022-02-21 NOTE — TELEPHONE ENCOUNTER
No insurance referral is required for in-network providers. Requires an order from PCP referring patient to neurology.

## 2022-02-21 NOTE — TELEPHONE ENCOUNTER
Patient is calling to request a referral to neurology at [facility]. She does not have an appointment yet and states they will not make her one without a referral. Patient is being referred here by Dr. Monae Patel. Please fax the referral to [phone].

## 2022-02-24 ENCOUNTER — OFFICE VISIT (OUTPATIENT)
Dept: INTERNAL MEDICINE CLINIC | Age: 67
End: 2022-02-24
Payer: MEDICARE

## 2022-02-24 ENCOUNTER — TELEPHONE (OUTPATIENT)
Dept: INTERNAL MEDICINE CLINIC | Age: 67
End: 2022-02-24

## 2022-02-24 VITALS
RESPIRATION RATE: 18 BRPM | WEIGHT: 173 LBS | TEMPERATURE: 98.3 F | OXYGEN SATURATION: 95 % | HEIGHT: 64 IN | DIASTOLIC BLOOD PRESSURE: 73 MMHG | BODY MASS INDEX: 29.53 KG/M2 | HEART RATE: 102 BPM | SYSTOLIC BLOOD PRESSURE: 117 MMHG

## 2022-02-24 DIAGNOSIS — R10.9 ABDOMINAL PAIN, UNSPECIFIED ABDOMINAL LOCATION: ICD-10-CM

## 2022-02-24 DIAGNOSIS — R30.0 DYSURIA: Primary | ICD-10-CM

## 2022-02-24 DIAGNOSIS — F41.9 ANXIETY: ICD-10-CM

## 2022-02-24 DIAGNOSIS — R53.83 FATIGUE, UNSPECIFIED TYPE: ICD-10-CM

## 2022-02-24 DIAGNOSIS — B37.9 YEAST INFECTION: ICD-10-CM

## 2022-02-24 PROCEDURE — 1101F PT FALLS ASSESS-DOCD LE1/YR: CPT | Performed by: INTERNAL MEDICINE

## 2022-02-24 PROCEDURE — 3017F COLORECTAL CA SCREEN DOC REV: CPT | Performed by: INTERNAL MEDICINE

## 2022-02-24 PROCEDURE — G8427 DOCREV CUR MEDS BY ELIG CLIN: HCPCS | Performed by: INTERNAL MEDICINE

## 2022-02-24 PROCEDURE — G8432 DEP SCR NOT DOC, RNG: HCPCS | Performed by: INTERNAL MEDICINE

## 2022-02-24 PROCEDURE — G8419 CALC BMI OUT NRM PARAM NOF/U: HCPCS | Performed by: INTERNAL MEDICINE

## 2022-02-24 PROCEDURE — G8536 NO DOC ELDER MAL SCRN: HCPCS | Performed by: INTERNAL MEDICINE

## 2022-02-24 PROCEDURE — 99214 OFFICE O/P EST MOD 30 MIN: CPT | Performed by: INTERNAL MEDICINE

## 2022-02-24 PROCEDURE — 1090F PRES/ABSN URINE INCON ASSESS: CPT | Performed by: INTERNAL MEDICINE

## 2022-02-24 PROCEDURE — G8399 PT W/DXA RESULTS DOCUMENT: HCPCS | Performed by: INTERNAL MEDICINE

## 2022-02-24 PROCEDURE — G9899 SCRN MAM PERF RSLTS DOC: HCPCS | Performed by: INTERNAL MEDICINE

## 2022-02-24 RX ORDER — DICYCLOMINE HYDROCHLORIDE 10 MG/1
10 CAPSULE ORAL
Qty: 30 CAPSULE | Refills: 1 | Status: SHIPPED | OUTPATIENT
Start: 2022-02-24 | End: 2022-09-26 | Stop reason: SDUPTHER

## 2022-02-24 RX ORDER — FLUCONAZOLE 150 MG/1
150 TABLET ORAL DAILY
Qty: 1 TABLET | Refills: 0 | Status: SHIPPED | OUTPATIENT
Start: 2022-02-24 | End: 2022-02-25

## 2022-02-24 NOTE — PROGRESS NOTES
Sabiah Valenzuela is a 74 y.o. female who presents today for Bladder Infection (RM21// pt presenting today for UTI hurts and burn with urination, has had a achy stomach for 3 weeks, and sinus infection? head/nasal congestion and pressure)  . She has a history of   Patient Active Problem List   Diagnosis Code    Hyperlipidemia E78.5    Gastroesophageal reflux disease K21.9    Allergic rhinitis J30.9    Anxiety F41.9   . Today patient is here for an acute visit. Having lower abd pain. No bowel changes. Cramping feeling. No stool changes. Some burning with urination. Has been much more anxious. Denies any blood or mucus in her stool. Does report that she is been dealing with a yeast infection, but unsure if she has a bladder infection. Has had a bit more frequent headaches recently. Generalized anxiety has been up a bit recently. Not sure why. She is in the process of trying to get neuropsych testing scheduled. Memory has been a bit off. ROS  Review of Systems   Constitutional: Positive for malaise/fatigue. Negative for chills, fever and weight loss. HENT: Negative for congestion and sore throat. Eyes: Negative for blurred vision, double vision and photophobia. Respiratory: Negative for cough and shortness of breath. Cardiovascular: Negative for chest pain, palpitations and leg swelling. Gastrointestinal: Positive for abdominal pain. Negative for constipation, diarrhea, heartburn, nausea and vomiting. Genitourinary: Negative for dysuria, frequency and urgency. Musculoskeletal: Negative for joint pain and myalgias. Skin: Negative for rash. Neurological: Positive for headaches. Negative for dizziness, tingling, tremors, sensory change, speech change, focal weakness, seizures and weakness. Endo/Heme/Allergies: Does not bruise/bleed easily. Psychiatric/Behavioral: Positive for memory loss. Negative for suicidal ideas. The patient is nervous/anxious.         Visit Vitals  BP 117/73 (BP 1 Location: Left upper arm, BP Patient Position: Sitting, BP Cuff Size: Large adult)   Pulse (!) 102   Temp 98.3 °F (36.8 °C) (Oral)   Resp 18   Ht 5' 4\" (1.626 m)   Wt 173 lb (78.5 kg)   SpO2 95%   BMI 29.70 kg/m²       Physical Exam  Constitutional:       Appearance: She is well-developed. HENT:      Head: Normocephalic and atraumatic. Right Ear: Tympanic membrane normal.      Left Ear: Tympanic membrane normal.   Cardiovascular:      Rate and Rhythm: Normal rate and regular rhythm. Heart sounds: No murmur heard. Pulmonary:      Effort: Pulmonary effort is normal. No respiratory distress. Abdominal:      General: Abdomen is flat. Bowel sounds are normal. There is no distension. Palpations: Abdomen is soft. Comments: Generalized discomfort but no peritoneal signs. No guarding   Skin:     General: Skin is warm and dry. Neurological:      Mental Status: She is alert and oriented to person, place, and time. Psychiatric:         Behavior: Behavior normal.           Current Outpatient Medications   Medication Sig    ascorbic acid (VITAMIN C PO) Take  by mouth.  zinc sulfate (ZINC-15 PO) Take  by mouth.  MAGNESIUM PO Take  by mouth.  guaiFENesin ER (Mucinex) 600 mg ER tablet Take 600 mg by mouth two (2) times a day.  butalbital-acetaminophen-caff (FIORICET) -40 mg per capsule TAKE 1 CAPSULE BY MOUTH EVERY 6 HOURS AS NEEDED FOR PAIN    ALPRAZolam (XANAX) 0.5 mg tablet AS NEEDED FOR ANXIETY    fluticasone propionate (Flonase) 50 mcg/actuation nasal spray 2 Sprays by Both Nostrils route daily.  albuterol (Ventolin HFA) 90 mcg/actuation inhaler Take 1 Puff by inhalation every four (4) hours as needed for Wheezing.  simvastatin (ZOCOR) 10 mg tablet TAKE 1 TABLET BY MOUTH EVERY DAY AT NIGHT    acetaminophen (TylenoL) 325 mg tablet Tylenol 325 mg tablet   PO in office    famotidine (PEPCID) 20 mg tablet Take 1 Tab by mouth nightly.     vitamin e (E GEMS) 100 unit capsule Take  by mouth daily.  cholecalciferol, VITAMIN D3, (VITAMIN D3) 5,000 unit tab tablet Take 1 Tab by mouth two (2) times a day.  OTHER Stool softener    B6/folic/B12/coffee/phosphatid (NEURIVA PLUS PO) Take  by mouth. (Patient not taking: Reported on 2/24/2022)     No current facility-administered medications for this visit. Past Medical History:   Diagnosis Date    Anxiety     Gluten intolerance     Hypercholesterolemia     Sinus problem       Past Surgical History:   Procedure Laterality Date    HX COLONOSCOPY      HX GYN      endometrial ablation    HX HEENT      wisdom teeth extraction    HX ORTHOPAEDIC Left     left wrist ulnar nerve transposition    HX SHOULDER ARTHROSCOPY Bilateral     HX TONSILLECTOMY      [place] REVAGINAL PROLAPSE,UTEROSACRAL        Social History     Tobacco Use    Smoking status: Never Smoker    Smokeless tobacco: Never Used   Substance Use Topics    Alcohol use: Not Currently     Comment: wine      Family History   Problem Relation Age of Onset    Diabetes Mother     Hypertension Mother     Celiac Disease Father     Celiac Disease Brother     Diabetes Maternal Grandmother     No Known Problems Brother     No Known Problems Brother         Allergies   Allergen Reactions    Aspirin Not Reported This Time    Bupropion Nausea and Vomiting    Floxin [Ofloxacin] Rash    Ibuprofen Swelling    Maxalt [Rizatriptan] Palpitations    Prevacid [Lansoprazole] Rash    Wellbutrin [Bupropion Hcl] Rash        Assessment/Plan  Diagnoses and all orders for this visit:    1. Dysuria-UA unobtainable. Patient symptoms sound more GI in nature. We will treat a yeast infection and see what blood work shows. We will provide with an antispasmodic in the meantime. Low threshold for imaging if this persists  -     AMB POC URINALYSIS DIP STICK AUTO W/ MICRO  -     METABOLIC PANEL, COMPREHENSIVE; Future  -     CBC WITH AUTOMATED DIFF; Future    2.  Anxiety  - METABOLIC PANEL, COMPREHENSIVE; Future  -     CBC WITH AUTOMATED DIFF; Future    3. Abdominal pain, unspecified abdominal location-strong family history of celiac disease. She does note some gluten intolerance. -     CELIAC DISEASE PROFILE (REFLEX TTG, IGG); Future  -     dicyclomine (BENTYL) 10 mg capsule; Take 1 Capsule by mouth three (3) times daily as needed for Abdominal Cramps. -     fluconazole (DIFLUCAN) 150 mg tablet; Take 1 Tablet by mouth daily for 1 day. FDA advises cautious prescribing of oral fluconazole in pregnancy. 4. Fatigue, unspecified type-generalized. Also has some memory loss recently. She is in the process of scheduling neuropsych testing.  -     METABOLIC PANEL, COMPREHENSIVE; Future  -     CBC WITH AUTOMATED DIFF; Future  -     TSH 3RD GENERATION; Future    5. Yeast infection  -     fluconazole (DIFLUCAN) 150 mg tablet; Take 1 Tablet by mouth daily for 1 day. FDA advises cautious prescribing of oral fluconazole in pregnancy. parker Jeffers MD  2/24/2022    This note was created with the help of speech recognition software [employer]) and may contain some 'sound alike' errors.

## 2022-02-24 NOTE — TELEPHONE ENCOUNTER
Quan Garcia   51    Patient stated she called earlier and spoke with Dr. Antonio Flynn nurse and they were supposed to call and get approval for an antibiotic. Patient has sinus infection symptoms and UTI. Please call her back when you're next available at [phone]. Thank you!

## 2022-02-24 NOTE — PROGRESS NOTES
Hadley Renner  Identified pt with two pt identifiers(name and ). Chief Complaint   Patient presents with    Bladder Infection     RM21// pt presenting today for UTI hurts and burn with urination, has had a achy stomach for 3 weeks, and sinus infection? head/nasal congestion and pressure       1. Have you been to the ER, urgent care clinic since your last visit? Hospitalized since your last visit? NO    2. Have you seen or consulted any other health care providers outside of the [address] [place] [address] since your last visit? Include any pap smears or colon screening. NO      Provider notified of reason for visit, vitals and flowsheets obtained on patients.      Patient received paperwork for advance directive during previous visit but has not completed at this time     Reviewed record In preparation for visit, huddled with provider and have obtained necessary documentation      Health Maintenance Due   Topic    COVID-19 Vaccine (1)    DTaP/Tdap/Td series (2 - Td or Tdap)    Medicare Yearly Exam     Breast Cancer Screen Mammogram        Wt Readings from Last 3 Encounters:   22 173 lb (78.5 kg)   22 169 lb 9.6 oz (76.9 kg)   21 163 lb 12.8 oz (74.3 kg)     Temp Readings from Last 3 Encounters:   22 98.3 °F (36.8 °C) (Oral)   22 98.2 °F (36.8 °C) (Oral)   21 98.4 °F (36.9 °C) (Oral)     BP Readings from Last 3 Encounters:   22 117/73   22 125/75   21 (!) 172/76     Pulse Readings from Last 3 Encounters:   22 (!) 102   22 73   21 69     Vitals:    22 1350   BP: 117/73   Pulse: (!) 102   Resp: 18   Temp: 98.3 °F (36.8 °C)   TempSrc: Oral   SpO2: 95%   Weight: 173 lb (78.5 kg)   Height: 5' 4\" (1.626 m)   PainSc:   7         Learning Assessment:  :     Learning Assessment 10/14/2020   PRIMARY LEARNER Patient   HIGHEST LEVEL OF EDUCATION - PRIMARY LEARNER  [employer]   BARRIERS PRIMARY LEARNER NONE   CO-LEARNER CAREGIVER No   PRIMARY LANGUAGE ENGLISH   LEARNER PREFERENCE PRIMARY OTHER (COMMENT)   ANSWERED BY patient    RELATIONSHIP SELF       Depression Screening:  :     3 most recent PHQ Screens 2/24/2022   Little interest or pleasure in doing things Not at all   Feeling down, depressed, irritable, or hopeless Not at all   Total Score PHQ 2 0   Trouble falling or staying asleep, or sleeping too much -   Feeling tired or having little energy -   Poor appetite, weight loss, or overeating -   Feeling bad about yourself - or that you are a failure or have let yourself or your family down -   Trouble concentrating on things such as school, work, reading, or watching TV -   Moving or speaking so slowly that other people could have noticed; or the opposite being so fidgety that others notice -   Thoughts of being better off dead, or hurting yourself in some way -   PHQ 9 Score -   How difficult have these problems made it for you to do your work, take care of your home and get along with others -       Fall Risk Assessment:  :     Fall Risk Assessment, last 12 mths 4/27/2021   Able to walk? Yes   Fall in past 12 months? 0   Do you feel unsteady? 0   Are you worried about falling 0       Abuse Screening:  :     Abuse Screening Questionnaire 4/27/2021 2/24/2021 4/30/2020 4/7/2020 10/3/2019 12/4/2018 8/8/2018   Do you ever feel afraid of your partner? N N N N N N N   Are you in a relationship with someone who physically or mentally threatens you? N N N N N N N   Is it safe for you to go home? Y Y Y Y Y Y Y       ADL Screening:  :     No flowsheet data found. Medication reconciliation up to date and corrected with patient at this time.

## 2022-02-25 NOTE — TELEPHONE ENCOUNTER
Paged by daughter. Patient now with a fever. Feeling a bit worse. Discussed my concerns about her based on her clinical state today. Urge for her to be evaluated ASAP tonight.    Daughter will bring her to [facility] ER>

## 2022-02-27 LAB
ALBUMIN SERPL-MCNC: 4.1 G/DL (ref 3.8–4.8)
ALBUMIN/GLOB SERPL: 1.7 {RATIO} (ref 1.2–2.2)
ALP SERPL-CCNC: 83 IU/L (ref 44–121)
ALT SERPL-CCNC: 23 IU/L (ref 0–32)
AST SERPL-CCNC: 39 IU/L (ref 0–40)
BASOPHILS # BLD AUTO: 0 X10E3/UL (ref 0–0.2)
BASOPHILS NFR BLD AUTO: 0 %
BILIRUB SERPL-MCNC: 0.7 MG/DL (ref 0–1.2)
BUN SERPL-MCNC: 13 MG/DL (ref 8–27)
BUN/CREAT SERPL: 12 (ref 12–28)
CALCIUM SERPL-MCNC: 9.2 MG/DL (ref 8.7–10.3)
CHLORIDE SERPL-SCNC: 100 MMOL/L (ref 96–106)
CO2 SERPL-SCNC: 20 MMOL/L (ref 20–29)
CREAT SERPL-MCNC: 1.06 MG/DL (ref 0.57–1)
ENDOMYSIUM IGA SER QL: NEGATIVE
EOSINOPHIL # BLD AUTO: 0.1 X10E3/UL (ref 0–0.4)
EOSINOPHIL NFR BLD AUTO: 0 %
ERYTHROCYTE [DISTWIDTH] IN BLOOD BY AUTOMATED COUNT: 13.6 % (ref 11.7–15.4)
GLOBULIN SER CALC-MCNC: 2.4 G/DL (ref 1.5–4.5)
GLUCOSE SERPL-MCNC: 106 MG/DL (ref 65–99)
HCT VFR BLD AUTO: 41.6 % (ref 34–46.6)
HGB BLD-MCNC: 14.1 G/DL (ref 11.1–15.9)
IGA SERPL-MCNC: 155 MG/DL (ref 87–352)
IMM GRANULOCYTES # BLD AUTO: 0 X10E3/UL (ref 0–0.1)
IMM GRANULOCYTES NFR BLD AUTO: 0 %
INTERPRETATION: NORMAL
LYMPHOCYTES # BLD AUTO: 0.2 X10E3/UL (ref 0.7–3.1)
LYMPHOCYTES NFR BLD AUTO: 2 %
MCH RBC QN AUTO: 29.9 PG (ref 26.6–33)
MCHC RBC AUTO-ENTMCNC: 33.9 G/DL (ref 31.5–35.7)
MCV RBC AUTO: 88 FL (ref 79–97)
MONOCYTES # BLD AUTO: 0.2 X10E3/UL (ref 0.1–0.9)
MONOCYTES NFR BLD AUTO: 1 %
NEUTROPHILS # BLD AUTO: 14.5 X10E3/UL (ref 1.4–7)
NEUTROPHILS NFR BLD AUTO: 97 %
PLATELET # BLD AUTO: 235 X10E3/UL (ref 150–450)
POTASSIUM SERPL-SCNC: 4 MMOL/L (ref 3.5–5.2)
PROT SERPL-MCNC: 6.5 G/DL (ref 6–8.5)
RBC # BLD AUTO: 4.71 X10E6/UL (ref 3.77–5.28)
SODIUM SERPL-SCNC: 139 MMOL/L (ref 134–144)
TSH SERPL DL<=0.005 MIU/L-ACNC: 1.87 UIU/ML (ref 0.45–4.5)
TTG IGA SER-ACNC: <2 U/ML (ref 0–3)
WBC # BLD AUTO: 15 X10E3/UL (ref 3.4–10.8)

## 2022-03-01 ENCOUNTER — TELEPHONE (OUTPATIENT)
Dept: INTERNAL MEDICINE CLINIC | Age: 67
End: 2022-03-01

## 2022-03-01 NOTE — TELEPHONE ENCOUNTER
[facility] to get more info on pt's condition.      ----- Message from Shane Shields sent at 3/1/2022 11:49 AM EST -----  Subject: Message to Provider    QUESTIONS  Information for Provider? Dara (daughter) called today to inform Dr. Andrea Rogers that she is concerned the pt. is still having abdominal pain. Pt.   is also taking bentyl and it isn't working. Pt. doesn't have much of an   appetite and there is still some confusion. Dara would like a call back   to discuss. Dara she can be messaged her via [employer].  ---------------------------------------------------------------------------  --------------  [address]  What is the best way for the office to contact you? OK to leave message on   voicemail  Preferred Call Back Phone Number? [phone]  ---------------------------------------------------------------------------  --------------  SCRIPT ANSWERS  Relationship to Patient? Other  Representative Name? Dara  Is the Representative on the appropriate HIPAA document in Epic?  Yes

## 2022-03-02 ENCOUNTER — TRANSCRIBE ORDER (OUTPATIENT)
Dept: SCHEDULING | Age: 67
End: 2022-03-02

## 2022-03-02 DIAGNOSIS — Z12.31 SCREENING MAMMOGRAM FOR HIGH-RISK PATIENT: Primary | ICD-10-CM

## 2022-03-03 ENCOUNTER — OFFICE VISIT (OUTPATIENT)
Dept: INTERNAL MEDICINE CLINIC | Age: 67
End: 2022-03-03
Payer: MEDICARE

## 2022-03-03 VITALS
OXYGEN SATURATION: 97 % | HEART RATE: 65 BPM | HEIGHT: 64 IN | BODY MASS INDEX: 28.65 KG/M2 | RESPIRATION RATE: 14 BRPM | WEIGHT: 167.8 LBS | DIASTOLIC BLOOD PRESSURE: 79 MMHG | TEMPERATURE: 97.8 F | SYSTOLIC BLOOD PRESSURE: 167 MMHG

## 2022-03-03 DIAGNOSIS — A41.9 SEPSIS, DUE TO UNSPECIFIED ORGANISM, UNSPECIFIED WHETHER ACUTE ORGAN DYSFUNCTION PRESENT (HCC): ICD-10-CM

## 2022-03-03 DIAGNOSIS — Z09 HOSPITAL DISCHARGE FOLLOW-UP: Primary | ICD-10-CM

## 2022-03-03 DIAGNOSIS — R30.0 DYSURIA: ICD-10-CM

## 2022-03-03 LAB
BILIRUB UR QL STRIP: NEGATIVE
GLUCOSE UR-MCNC: NEGATIVE MG/DL
KETONES P FAST UR STRIP-MCNC: NORMAL MG/DL
PH UR STRIP: 6 [PH] (ref 4.6–8)
PROT UR QL STRIP: NEGATIVE
SP GR UR STRIP: 1.02 (ref 1–1.03)
UA UROBILINOGEN AMB POC: NORMAL (ref 0.2–1)
URINALYSIS CLARITY POC: NORMAL
URINALYSIS COLOR POC: YELLOW
URINE BLOOD POC: NEGATIVE
URINE LEUKOCYTES POC: NEGATIVE
URINE NITRITES POC: NEGATIVE

## 2022-03-03 PROCEDURE — G8427 DOCREV CUR MEDS BY ELIG CLIN: HCPCS | Performed by: INTERNAL MEDICINE

## 2022-03-03 PROCEDURE — 81001 URINALYSIS AUTO W/SCOPE: CPT | Performed by: INTERNAL MEDICINE

## 2022-03-03 PROCEDURE — 99214 OFFICE O/P EST MOD 30 MIN: CPT | Performed by: INTERNAL MEDICINE

## 2022-03-03 PROCEDURE — 1111F DSCHRG MED/CURRENT MED MERGE: CPT | Performed by: INTERNAL MEDICINE

## 2022-03-03 RX ORDER — CEFDINIR 300 MG/1
CAPSULE ORAL
COMMUNITY
Start: 2022-03-02 | End: 2022-03-03 | Stop reason: ALTCHOICE

## 2022-03-03 RX ORDER — FLUCONAZOLE 150 MG/1
TABLET ORAL
COMMUNITY
Start: 2022-02-26 | End: 2022-03-03 | Stop reason: ALTCHOICE

## 2022-03-03 NOTE — PROGRESS NOTES
Daniel Zhou  Identified pt with two pt identifiers(name and ). Chief Complaint   Patient presents with   [facility] Follow Up     RM21// pt presenting today for hospital f/u for sepsis from uti, pt is still having some burning was seen in ED at [facility]       1. Have you been to the ER, urgent care clinic since your last visit? Hospitalized since your last visit? NO    2. Have you seen or consulted any other health care providers outside of the [address] [place] [address] since your last visit? Include any pap smears or colon screening. NO      Provider notified of reason for visit, vitals and flowsheets obtained on patients.      Patient received paperwork for advance directive during previous visit but has not completed at this time     Reviewed record In preparation for visit, huddled with provider and have obtained necessary documentation      Health Maintenance Due   Topic    COVID-19 Vaccine (1)    DTaP/Tdap/Td series (2 - Td or Tdap)    Medicare Yearly Exam     Breast Cancer Screen Mammogram        Wt Readings from Last 3 Encounters:   22 167 lb 12.8 oz (76.1 kg)   22 173 lb (78.5 kg)   22 169 lb 9.6 oz (76.9 kg)     Temp Readings from Last 3 Encounters:   22 97.8 °F (36.6 °C) (Oral)   22 98.3 °F (36.8 °C) (Oral)   22 98.2 °F (36.8 °C) (Oral)     BP Readings from Last 3 Encounters:   22 (!) 167/79   22 117/73   22 125/75     Pulse Readings from Last 3 Encounters:   22 65   22 (!) 102   22 73     Vitals:    22 1125   BP: (!) 167/79   Pulse: 65   Resp: 14   Temp: 97.8 °F (36.6 °C)   TempSrc: Oral   SpO2: 97%   Weight: 167 lb 12.8 oz (76.1 kg)   Height: 5' 4\" (1.626 m)         Learning Assessment:  :     Learning Assessment 10/14/2020   PRIMARY LEARNER Patient   HIGHEST LEVEL OF EDUCATION - PRIMARY LEARNER  [id number] PRIMARY LEARNER NONE   CO-LEARNER CAREGIVER No   PRIMARY LANGUAGE ENGLISH   LEARNER PREFERENCE PRIMARY OTHER (COMMENT)   ANSWERED BY patient    RELATIONSHIP SELF       Depression Screening:  :     3 most recent PHQ Screens 3/3/2022   Little interest or pleasure in doing things Not at all   Feeling down, depressed, irritable, or hopeless Not at all   Total Score PHQ 2 0   Trouble falling or staying asleep, or sleeping too much -   Feeling tired or having little energy -   Poor appetite, weight loss, or overeating -   Feeling bad about yourself - or that you are a failure or have let yourself or your family down -   Trouble concentrating on things such as school, work, reading, or watching TV -   Moving or speaking so slowly that other people could have noticed; or the opposite being so fidgety that others notice -   Thoughts of being better off dead, or hurting yourself in some way -   PHQ 9 Score -   How difficult have these problems made it for you to do your work, take care of your home and get along with others -       Fall Risk Assessment:  :     Fall Risk Assessment, last 12 mths 4/27/2021   Able to walk? Yes   Fall in past 12 months? 0   Do you feel unsteady? 0   Are you worried about falling 0       Abuse Screening:  :     Abuse Screening Questionnaire 4/27/2021 2/24/2021 4/30/2020 4/7/2020 10/3/2019 12/4/2018 8/8/2018   Do you ever feel afraid of your partner? N N N N N N N   Are you in a relationship with someone who physically or mentally threatens you? N N N N N N N   Is it safe for you to go home? Y Y Y Y Y Y Y       ADL Screening:  :     No flowsheet data found. Medication reconciliation up to date and corrected with patient at this time.

## 2022-03-03 NOTE — PROGRESS NOTES
Marisela Munoz is a 74 y.o. female who presents today for Hospital Follow Up (RM21// pt presenting today for hospital f/u for sepsis from uti, pt is still having some burning [place] [facility]  . She has a history of   Patient Active Problem List   Diagnosis Code    Hyperlipidemia E78.5    Gastroesophageal reflux disease K21.9    Allergic rhinitis J30.9    Anxiety F41.9   . Today patient is here for hospital follow-up. Hospitalization: Patient was hospitalized in [facility] facility from February 25 of 26. I did urge her to go seek urgent evaluation as she was somewhat confused and had spiked a fever. I had seen her in the office earlier that day where she was complaining of some lower GI concerns. Blood work did show a leukocytosis. She was placed on IV antibiotics. Her culture did come back with mixed urogenital giovanny. She was sent home on cefdinir 300 mg twice daily for 4 more days. I have reviewed CT scan of her abdomen pelvis which did not show any acute processes head CT was negative for any acute changes. Since being home patient reports feeling better. Infusion has resolved. Still having some mild cramping in her abdomen but this is getting better. She will be finishing antibiotics today. ROS  Review of Systems   Constitutional: Negative for chills, fever and weight loss. HENT: Negative for congestion and sore throat. Eyes: Negative for blurred vision, double vision and photophobia. Respiratory: Negative for cough and shortness of breath. Cardiovascular: Negative for chest pain, palpitations and leg swelling. Gastrointestinal: Positive for abdominal pain. Negative for constipation, diarrhea, heartburn, nausea and vomiting. Genitourinary: Negative for dysuria, frequency and urgency. Musculoskeletal: Negative for joint pain and myalgias. Skin: Negative for rash. Neurological: Negative. Negative for headaches.         Confusion resolved Endo/Heme/Allergies: Does not bruise/bleed easily. Psychiatric/Behavioral: Negative for memory loss and suicidal ideas. Visit Vitals  BP (!) 167/79 (BP 1 Location: Left upper arm, BP Patient Position: Sitting, BP Cuff Size: Large adult)   Pulse 65   Temp 97.8 °F (36.6 °C) (Oral)   Resp 14   Ht 5' 4\" (1.626 m)   Wt 167 lb 12.8 oz (76.1 kg)   SpO2 97%   BMI 28.80 kg/m²       Physical Exam  Constitutional:       Appearance: She is well-developed. HENT:      Head: Normocephalic and atraumatic. Cardiovascular:      Rate and Rhythm: Normal rate and regular rhythm. Heart sounds: No murmur heard. Pulmonary:      Effort: Pulmonary effort is normal. No respiratory distress. Skin:     General: Skin is warm and dry. Neurological:      Mental Status: She is alert and oriented to person, place, and time. Psychiatric:         Behavior: Behavior normal.           Current Outpatient Medications   Medication Sig    dicyclomine (BENTYL) 10 mg capsule Take 1 Capsule by mouth three (3) times daily as needed for Abdominal Cramps.  ascorbic acid (VITAMIN C PO) Take  by mouth.  zinc sulfate (ZINC-15 PO) Take  by mouth.  MAGNESIUM PO Take  by mouth.  guaiFENesin ER (Mucinex) 600 mg ER tablet Take 600 mg by mouth two (2) times a day.  butalbital-acetaminophen-caff (FIORICET) -40 mg per capsule TAKE 1 CAPSULE BY MOUTH EVERY 6 HOURS AS NEEDED FOR PAIN    ALPRAZolam (XANAX) 0.5 mg tablet AS NEEDED FOR ANXIETY    fluticasone propionate (Flonase) 50 mcg/actuation nasal spray 2 Sprays by Both Nostrils route daily.  albuterol (Ventolin HFA) 90 mcg/actuation inhaler Take 1 Puff by inhalation every four (4) hours as needed for Wheezing.  simvastatin (ZOCOR) 10 mg tablet TAKE 1 TABLET BY MOUTH EVERY DAY AT NIGHT    acetaminophen (TylenoL) 325 mg tablet Tylenol 325 mg tablet   PO in office    famotidine (PEPCID) 20 mg tablet Take 1 Tab by mouth nightly.     vitamin e (E GEMS) 100 unit capsule Take  by mouth daily.  cholecalciferol, VITAMIN D3, (VITAMIN D3) 5,000 unit tab tablet Take 1 Tab by mouth two (2) times a day.  OTHER Stool softener    B6/folic/B12/coffee/phosphatid (NEURIVA PLUS PO) Take  by mouth. (Patient not taking: Reported on 2/24/2022)     No current facility-administered medications for this visit. Past Medical History:   Diagnosis Date    Anxiety     Gluten intolerance     Hypercholesterolemia     Sinus problem       Past Surgical History:   Procedure Laterality Date    HX COLONOSCOPY      HX GYN      endometrial ablation    HX HEENT      wisdom teeth extraction    HX ORTHOPAEDIC Left     left wrist ulnar nerve transposition    HX SHOULDER ARTHROSCOPY Bilateral     HX TONSILLECTOMY      NE REVAGINAL PROLAPSE,UTEROSACRAL        Social History     Tobacco Use    Smoking status: Never Smoker    Smokeless tobacco: Never Used   Substance Use Topics    Alcohol use: Not Currently     Comment: wine      Family History   Problem Relation Age of Onset    Diabetes Mother     Hypertension Mother     Celiac Disease Father     Celiac Disease Brother     Diabetes Maternal Grandmother     No Known Problems Brother     No Known Problems Brother         Allergies   Allergen Reactions    Aspirin Not Reported This Time    Bupropion Nausea and Vomiting    Floxin [Ofloxacin] Rash    Ibuprofen Swelling    Maxalt [Rizatriptan] Palpitations    Prevacid [Lansoprazole] Rash    Wellbutrin [Bupropion Hcl] Rash        Assessment/Plan  Diagnoses and all orders for this visit:    1. Hospital discharge [id number] hospitalization as well as blood work and imaging findings. Has a did not identify one particular bacteria we will reculture her urine. She is finishing antibiotics today. Overall she is much better. -     NE DISCHARGE MEDS RECONCILED W/ CURRENT OUTPATIENT MED LIST    2.  Dysuria  -     AMB POC URINALYSIS DIP STICK AUTO W/ MICRO  -     CULTURE, URINE; Future    3. Sepsis, due to unspecified organism, unspecified whether acute organ dysfunction present (HCC)  -     CULTURE, URINE; Future            Niru Morales MD  3/3/2022    This note was created with the help of speech recognition software Saroj Singh) and may contain some 'sound alike' errors.

## 2022-03-04 DIAGNOSIS — G43.809 OTHER MIGRAINE WITHOUT STATUS MIGRAINOSUS, NOT INTRACTABLE: ICD-10-CM

## 2022-03-04 RX ORDER — BUTALBITAL, ACETAMINOPHEN AND CAFFEINE 300; 40; 50 MG/1; MG/1; MG/1
CAPSULE ORAL
Qty: 20 CAPSULE | Refills: 1 | Status: SHIPPED | OUTPATIENT
Start: 2022-03-04 | End: 2022-04-02

## 2022-03-10 LAB — BACTERIA UR CULT: ABNORMAL

## 2022-03-14 ENCOUNTER — TELEPHONE (OUTPATIENT)
Dept: INTERNAL MEDICINE CLINIC | Age: 67
End: 2022-03-14

## 2022-03-15 ENCOUNTER — TELEPHONE (OUTPATIENT)
Dept: INTERNAL MEDICINE CLINIC | Age: 67
End: 2022-03-15

## 2022-03-15 RX ORDER — NITROFURANTOIN 25; 75 MG/1; MG/1
100 CAPSULE ORAL 2 TIMES DAILY
Qty: 10 CAPSULE | Refills: 0 | Status: SHIPPED | OUTPATIENT
Start: 2022-03-15 | End: 2022-03-21 | Stop reason: ALTCHOICE

## 2022-03-15 NOTE — TELEPHONE ENCOUNTER
5-day course of antibiotics sent out. Patient to see me if symptoms do not resolve after antibiotics.

## 2022-03-15 NOTE — TELEPHONE ENCOUNTER
Pt called back this morning to inform that she is actually still having symptoms - some lower abdominal fullness, burning while urinating. Pt would like to start on another course of abx.

## 2022-03-17 ENCOUNTER — TELEPHONE (OUTPATIENT)
Dept: INTERNAL MEDICINE CLINIC | Age: 67
End: 2022-03-17

## 2022-03-17 DIAGNOSIS — E78.5 HYPERLIPIDEMIA, UNSPECIFIED HYPERLIPIDEMIA TYPE: ICD-10-CM

## 2022-03-17 DIAGNOSIS — F41.9 ANXIETY: ICD-10-CM

## 2022-03-17 RX ORDER — SIMVASTATIN 10 MG/1
TABLET, FILM COATED ORAL
Qty: 90 TABLET | Refills: 2 | Status: SHIPPED | OUTPATIENT
Start: 2022-03-17

## 2022-03-17 RX ORDER — ALPRAZOLAM 0.5 MG/1
TABLET ORAL
Qty: 30 TABLET | Refills: 0 | Status: SHIPPED | OUTPATIENT
Start: 2022-03-17 | End: 2022-06-04

## 2022-03-17 NOTE — TELEPHONE ENCOUNTER
----- Message from Jin Garner sent at 3/16/2022  4:55 PM EDT -----  Subject: Appointment Request    Reason for Call: Routine Existing Condition Follow Up    QUESTIONS  Type of Appointment? Established Patient  Reason for appointment request? No appointments available during search  Additional Information for Provider? Dara calling in for follow up UTI   appointment, screened green. Needs to be seen after finishing medication.   ---------------------------------------------------------------------------  --------------  CALL BACK INFO  What is the best way for the office to contact you? OK to leave message on   voicemail  Preferred Call Back Phone Number? [phone]  ---------------------------------------------------------------------------  --------------  SCRIPT ANSWERS  Relationship to Patient? Other  Representative Name? Oralia Bustamante  Additional information verified (besides Name and Date of Birth)? Phone   Number  Is this follow up request related to routine Diabetes Management? No  Have you been diagnosed with, awaiting test results for, or told that you   are suspected of having COVID-19 (Coronavirus)? (If patient has tested   negative or was tested as a requirement for work, school, or travel and   not based on symptoms, answer no)? No  Within the past 10 days have you developed any of the following symptoms   (answer no if symptoms have been present longer than 10 days or began   more than 10 days ago)? Fever or Chills, Cough, Shortness of breath or   difficulty breathing, Loss of taste or smell, Sore throat, Nasal   congestion, Sneezing or runny nose, Fatigue or generalized body aches   (answer no if pain is specific to a body part e.g. back pain), Diarrhea,   Headache? No  Have you had close contact with someone with COVID-19 in the last 7 days? No  (Service Expert  click yes below to proceed with [employer] As Usual   Scheduling)?  Yes

## 2022-03-17 NOTE — TELEPHONE ENCOUNTER
Pt and daughter are very anxious regarding her current UTI, states that symptoms are slowly improving and worried might end up at the hospital again. Daughter informed that pt was really traumatized by the hospital admission. They would like to see PCP to discuss pt's memory issues that became worse with the hospital admission.

## 2022-03-19 PROBLEM — K21.9 GASTROESOPHAGEAL REFLUX DISEASE: Status: ACTIVE | Noted: 2017-10-10

## 2022-03-19 PROBLEM — F41.9 ANXIETY: Status: ACTIVE | Noted: 2018-04-10

## 2022-03-20 PROBLEM — J30.9 ALLERGIC RHINITIS: Status: ACTIVE | Noted: 2017-10-12

## 2022-03-20 PROBLEM — E78.5 HYPERLIPIDEMIA: Status: ACTIVE | Noted: 2017-10-10

## 2022-03-21 ENCOUNTER — OFFICE VISIT (OUTPATIENT)
Dept: INTERNAL MEDICINE CLINIC | Age: 67
End: 2022-03-21
Payer: MEDICARE

## 2022-03-21 VITALS
RESPIRATION RATE: 18 BRPM | HEIGHT: 64 IN | TEMPERATURE: 97.7 F | WEIGHT: 168.4 LBS | SYSTOLIC BLOOD PRESSURE: 152 MMHG | HEART RATE: 68 BPM | OXYGEN SATURATION: 98 % | DIASTOLIC BLOOD PRESSURE: 77 MMHG | BODY MASS INDEX: 28.75 KG/M2

## 2022-03-21 DIAGNOSIS — R41.3 MEMORY CHANGES: ICD-10-CM

## 2022-03-21 DIAGNOSIS — N39.0 RECURRENT UTI: Primary | ICD-10-CM

## 2022-03-21 DIAGNOSIS — R30.0 DYSURIA: ICD-10-CM

## 2022-03-21 LAB
BILIRUB UR QL STRIP: NEGATIVE
GLUCOSE UR-MCNC: NEGATIVE MG/DL
KETONES P FAST UR STRIP-MCNC: NEGATIVE MG/DL
PH UR STRIP: 7 [PH] (ref 4.6–8)
PROT UR QL STRIP: NEGATIVE
SP GR UR STRIP: 1.01 (ref 1–1.03)
UA UROBILINOGEN AMB POC: ABNORMAL (ref 0.2–1)
URINALYSIS CLARITY POC: CLEAR
URINALYSIS COLOR POC: YELLOW
URINE BLOOD POC: NEGATIVE
URINE LEUKOCYTES POC: ABNORMAL
URINE NITRITES POC: NEGATIVE

## 2022-03-21 PROCEDURE — G8536 NO DOC ELDER MAL SCRN: HCPCS | Performed by: INTERNAL MEDICINE

## 2022-03-21 PROCEDURE — G8427 DOCREV CUR MEDS BY ELIG CLIN: HCPCS | Performed by: INTERNAL MEDICINE

## 2022-03-21 PROCEDURE — G8399 PT W/DXA RESULTS DOCUMENT: HCPCS | Performed by: INTERNAL MEDICINE

## 2022-03-21 PROCEDURE — 3017F COLORECTAL CA SCREEN DOC REV: CPT | Performed by: INTERNAL MEDICINE

## 2022-03-21 PROCEDURE — 1090F PRES/ABSN URINE INCON ASSESS: CPT | Performed by: INTERNAL MEDICINE

## 2022-03-21 PROCEDURE — G8419 CALC BMI OUT NRM PARAM NOF/U: HCPCS | Performed by: INTERNAL MEDICINE

## 2022-03-21 PROCEDURE — 1101F PT FALLS ASSESS-DOCD LE1/YR: CPT | Performed by: INTERNAL MEDICINE

## 2022-03-21 PROCEDURE — G9899 SCRN MAM PERF RSLTS DOC: HCPCS | Performed by: INTERNAL MEDICINE

## 2022-03-21 PROCEDURE — G8510 SCR DEP NEG, NO PLAN REQD: HCPCS | Performed by: INTERNAL MEDICINE

## 2022-03-21 PROCEDURE — 99213 OFFICE O/P EST LOW 20 MIN: CPT | Performed by: INTERNAL MEDICINE

## 2022-03-21 PROCEDURE — 81001 URINALYSIS AUTO W/SCOPE: CPT | Performed by: INTERNAL MEDICINE

## 2022-03-21 RX ORDER — CEFDINIR 300 MG/1
CAPSULE ORAL
COMMUNITY
Start: 2022-03-14 | End: 2022-03-21 | Stop reason: ALTCHOICE

## 2022-03-21 NOTE — PROGRESS NOTES
Marisela Munoz  Identified pt with two pt identifiers(name and ). Chief Complaint   Patient presents with    Follow-up     RM18// pt presents today for uti f/u       1. Have you been to the ER, urgent care clinic since your last visit? Hospitalized since your last visit? NO    2. Have you seen or consulted any other health care providers outside of the [address] [place] [address] since your last visit? Include any pap smears or colon screening. NO      Provider notified of reason for visit, vitals and flowsheets obtained on patients.      Patient received paperwork for advance directive during previous visit but has not completed at this time     Reviewed record In preparation for visit, huddled with provider and have obtained necessary documentation      Health Maintenance Due   Topic    COVID-19 Vaccine (1)    DTaP/Tdap/Td series (2 - Td or Tdap)    Medicare Yearly Exam     Breast Cancer Screen Mammogram        Wt Readings from Last 3 Encounters:   22 168 lb 6.4 oz (76.4 kg)   22 167 lb 12.8 oz (76.1 kg)   22 173 lb (78.5 kg)     Temp Readings from Last 3 Encounters:   22 97.7 °F (36.5 °C) (Oral)   22 97.8 °F (36.6 °C) (Oral)   22 98.3 °F (36.8 °C) (Oral)     BP Readings from Last 3 Encounters:   22 (!) 152/77   22 (!) 167/79   22 117/73     Pulse Readings from Last 3 Encounters:   22 68   22 65   22 (!) 102     Vitals:    22 0921   BP: (!) 152/77   Pulse: 68   Resp: 18   Temp: 97.7 °F (36.5 °C)   TempSrc: Oral   SpO2: 98%   Weight: 168 lb 6.4 oz (76.4 kg)   Height: 5' 4\" (1.626 m)   PainSc:   0 - No pain         Learning Assessment:  :     Learning Assessment 10/14/2020   PRIMARY LEARNER Patient   HIGHEST LEVEL OF EDUCATION - PRIMARY LEARNER  [employer]   [employer] PRIMARY LEARNER NONE   CO-LEARNER CAREGIVER No   PRIMARY LANGUAGE ENGLISH   LEARNER PREFERENCE PRIMARY OTHER (COMMENT)   ANSWERED BY patient    RELATIONSHIP SELF Depression Screening:  :     3 most recent PHQ Screens 3/21/2022   Little interest or pleasure in doing things Not at all   Feeling down, depressed, irritable, or hopeless Not at all   Total Score PHQ 2 0   Trouble falling or staying asleep, or sleeping too much -   Feeling tired or having little energy -   Poor appetite, weight loss, or overeating -   Feeling bad about yourself - or that you are a failure or have let yourself or your family down -   Trouble concentrating on things such as school, work, reading, or watching TV -   Moving or speaking so slowly that other people could have noticed; or the opposite being so fidgety that others notice -   Thoughts of being better off dead, or hurting yourself in some way -   PHQ 9 Score -   How difficult have these problems made it for you to do your work, take care of your home and get along with others -       Fall Risk Assessment:  :     Fall Risk Assessment, last 12 mths 4/27/2021   Able to walk? Yes   Fall in past 12 months? 0   Do you feel unsteady? 0   Are you worried about falling 0       Abuse Screening:  :     Abuse Screening Questionnaire 4/27/2021 2/24/2021 4/30/2020 4/7/2020 10/3/2019 12/4/2018 8/8/2018   Do you ever feel afraid of your partner? N N N N N N N   Are you in a relationship with someone who physically or mentally threatens you? N N N N N N N   Is it safe for you to go home? Y Y Y Y Y Y Y       ADL Screening:  :     No flowsheet data found. Medication reconciliation up to date and corrected with patient at this time.

## 2022-03-21 NOTE — PROGRESS NOTES
Renetta Rojas is a 74 y.o. female who presents today for Follow-up (RM18// pt presents today for uti f/u)  . She has a history of   Patient Active Problem List   Diagnosis Code    Hyperlipidemia E78.5    Gastroesophageal reflux disease K21.9    Allergic rhinitis J30.9    Anxiety F41.9   . Today patient is here for follow-up. Liliana Mtz Hospitalization: Patient was hospitalized last month due to sepsis from urinary source. Repeat urine cultures were positive and we have retreated her for UTI. Since he reports that her symptoms have virtually resolved. Mental status is back to her baseline. Has Neuropsych testing scheduled with [facility]. ROS  Review of Systems   Constitutional: Negative for chills, fever and weight loss. HENT: Negative for congestion and sore throat. Eyes: Negative for blurred vision, double vision and photophobia. Respiratory: Negative for cough and shortness of breath. Cardiovascular: Negative for chest pain, palpitations and leg swelling. Gastrointestinal: Negative for abdominal pain, constipation, diarrhea, heartburn, nausea and vomiting. Some pressure to pelvis   Genitourinary: Negative for dysuria, frequency, hematuria and urgency. Musculoskeletal: Negative for back pain, joint pain, myalgias and neck pain. Skin: Negative for rash. Neurological: Negative. Negative for headaches. Endo/Heme/Allergies: Does not bruise/bleed easily. Psychiatric/Behavioral: Negative for depression, memory loss and suicidal ideas. Visit Vitals  BP (!) 152/77 (BP 1 Location: Left upper arm, BP Patient Position: Sitting, BP Cuff Size: Large adult)   Pulse 68   Temp 97.7 °F (36.5 °C) (Oral)   Resp 18   Ht 5' 4\" (1.626 m)   Wt 168 lb 6.4 oz (76.4 kg)   SpO2 98%   BMI 28.91 kg/m²       Physical Exam  Constitutional:       Appearance: She is well-developed. HENT:      Head: Normocephalic and atraumatic.    Cardiovascular:      Rate and Rhythm: Normal rate and regular rhythm. Heart sounds: No murmur heard. Pulmonary:      Effort: Pulmonary effort is normal. No respiratory distress. Abdominal:      Comments: No cvat   Skin:     General: Skin is warm and dry. Neurological:      Mental Status: She is alert and oriented to person, place, and time. Psychiatric:         Behavior: Behavior normal.           Current Outpatient Medications   Medication Sig    ALPRAZolam (XANAX) 0.5 mg tablet TAKE 1 TABLET DAILY AS NEEDED FOR ANXIETY    simvastatin (ZOCOR) 10 mg tablet TAKE 1 TABLET BY MOUTH EVERY DAY AT NIGHT    butalbital-acetaminophen-caff (FIORICET) -40 mg per capsule TAKE 1 CAPSULE BY MOUTH EVERY 6 HOURS AS NEEDED FOR PAIN    dicyclomine (BENTYL) 10 mg capsule Take 1 Capsule by mouth three (3) times daily as needed for Abdominal Cramps.  B6/folic/B12/coffee/phosphatid (NEURIVA PLUS PO) Take  by mouth.  ascorbic acid (VITAMIN C PO) Take  by mouth.  zinc sulfate (ZINC-15 PO) Take  by mouth.  MAGNESIUM PO Take  by mouth.  guaiFENesin ER (Mucinex) 600 mg ER tablet Take 600 mg by mouth two (2) times a day.  fluticasone propionate (Flonase) 50 mcg/actuation nasal spray 2 Sprays by Both Nostrils route daily.  albuterol (Ventolin HFA) 90 mcg/actuation inhaler Take 1 Puff by inhalation every four (4) hours as needed for Wheezing.  acetaminophen (TylenoL) 325 mg tablet Tylenol 325 mg tablet   PO in office    famotidine (PEPCID) 20 mg tablet Take 1 Tab by mouth nightly.  vitamin e (E GEMS) 100 unit capsule Take  by mouth daily.  cholecalciferol, VITAMIN D3, (VITAMIN D3) 5,000 unit tab tablet Take 1 Tab by mouth two (2) times a day.  OTHER Stool softener     No current facility-administered medications for this visit.         Past Medical History:   Diagnosis Date    Anxiety     Gluten intolerance     Hypercholesterolemia     Sinus problem       Past Surgical History:   Procedure Laterality Date    HX COLONOSCOPY      HX GYN endometrial ablation    HX HEENT      wisdom teeth extraction    HX ORTHOPAEDIC Left     left wrist ulnar nerve transposition    HX SHOULDER ARTHROSCOPY Bilateral     HX TONSILLECTOMY      [place] REVAGINAL PROLAPSE,UTEROSACRAL        Social History     Tobacco Use    Smoking status: Never Smoker    Smokeless tobacco: Never Used   Substance Use Topics    Alcohol use: Not Currently     Comment: wine      Family History   Problem Relation Age of Onset    Diabetes Mother     Hypertension Mother     Celiac Disease Father     Celiac Disease Brother     Diabetes Maternal Grandmother     No Known Problems Brother     No Known Problems Brother         Allergies   Allergen Reactions    Aspirin Not Reported This Time    Bupropion Nausea and Vomiting    Floxin [Ofloxacin] Rash    Ibuprofen Swelling    Maxalt [Rizatriptan] Palpitations    Prevacid [Lansoprazole] Rash    Wellbutrin [Bupropion Hcl] Rash        Assessment/Plan  Diagnoses and all orders for this visit:    1. Recurrent UTI-if urine culture is positive will need urological referral.  Has completed second round of antibiotics  -     CULTURE, URINE; Future    2. Dysuria  -     AMB POC URINALYSIS DIP STICK AUTO W/ MICRO  -     CULTURE, URINE; Future    3. Memory changes-has neuropsych testing scheduled            David Melendez MD  3/21/2022    This note was created with the help of speech recognition software (Dragon) and may contain some 'sound alike' errors.

## 2022-03-23 LAB — BACTERIA UR CULT: NORMAL

## 2022-04-01 DIAGNOSIS — G43.809 OTHER MIGRAINE WITHOUT STATUS MIGRAINOSUS, NOT INTRACTABLE: ICD-10-CM

## 2022-04-02 RX ORDER — BUTALBITAL, ACETAMINOPHEN AND CAFFEINE 300; 40; 50 MG/1; MG/1; MG/1
CAPSULE ORAL
Qty: 20 CAPSULE | Refills: 1 | Status: SHIPPED | OUTPATIENT
Start: 2022-04-02 | End: 2022-04-25 | Stop reason: SDUPTHER

## 2022-04-04 ENCOUNTER — HOSPITAL ENCOUNTER (OUTPATIENT)
Dept: MAMMOGRAPHY | Age: 67
Discharge: HOME | End: 2022-04-04
Attending: INTERNAL MEDICINE
Payer: MEDICARE

## 2022-04-04 DIAGNOSIS — Z12.31 SCREENING MAMMOGRAM FOR HIGH-RISK PATIENT: ICD-10-CM

## 2022-04-04 PROCEDURE — 77067 SCR MAMMO BI INCL CAD: CPT

## 2022-04-06 ENCOUNTER — OFFICE VISIT (OUTPATIENT)
Dept: NEUROLOGY | Age: 67
End: 2022-04-06
Payer: MEDICARE

## 2022-04-06 DIAGNOSIS — Z63.9 FAMILY DYNAMICS PROBLEM: ICD-10-CM

## 2022-04-06 DIAGNOSIS — R41.3 SHORT-TERM MEMORY LOSS: ICD-10-CM

## 2022-04-06 DIAGNOSIS — R41.89 COGNITIVE DECLINE: ICD-10-CM

## 2022-04-06 DIAGNOSIS — F41.1 GENERALIZED ANXIETY DISORDER: ICD-10-CM

## 2022-04-06 DIAGNOSIS — G31.84 MILD COGNITIVE IMPAIRMENT: Primary | ICD-10-CM

## 2022-04-06 PROCEDURE — 90791 PSYCH DIAGNOSTIC EVALUATION: CPT | Performed by: CLINICAL NEUROPSYCHOLOGIST

## 2022-04-06 SDOH — SOCIAL STABILITY - SOCIAL INSECURITY: PROBLEM RELATED TO PRIMARY SUPPORT GROUP, UNSPECIFIED: Z63.9

## 2022-04-06 NOTE — PROGRESS NOTES
[address] [facility],5Th [address]  [address]   [place] [address] [place] [facility]    [phone] Office   [phone] Fax      Neuropsychology    Initial Diagnostic Interview Note      Referral:  Catherine Clinton MD    Manjinder Crespo is a 69 y.o. right handed (in a relationship)  female who was accompanied by her daughter to the initial clinical interview on 4/6/22/  Please refer to her medical records for details pertaining to her history. At the start of the appointment, I reviewed the patient's [facility] Epic Chart (including Media scanned in from previous providers) for the active Problem List, all pertinent Past Medical Hx, medications, recent radiologic and laboratory findings. In addition, I reviewed pt's documented Immunization Record and Encounter History. 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 Her daughter lives with her, as does her SO. The patient has no history of stroke, meningitis/encephalitis, FELECIA Fever, Lupus, Lyme, TBI, sz. She has a past history of UTIs, but three weeks ago was so bad, got septic, and was in hospital. That was the worst.  She does not feel as though she has a memory issues. She drives without issue, and has no problems with medications, finances, day-to-day chores, etc.  She has chronic anxiety- at least ten years. She takes Xanax once in awhile. She tries not to. Family has noticed progressive changes in short term memory. The biggest concern is repeating herself. It is a lot. She forgets the content of conversations. Loses train of thought. She will tell daughter the same stories over and over. Watches her granddaughter. Will ask a question and a few minutes later will ask the same. This has been going on two years and much worse of late.    Stress can be a tad high, and certainly anxiety can be a factor, though concern is for organic versus functional versus both. Patient watches her other daughter's children, and she can have some cognitive fogginess at times. The daughter questions often what the patient is doing, feels like she is very controlling. Patient wants to help but then nothing ever helps. I did ask her why she continues to place herself in this situation. [place] issues, perhaps? Patient not sure. Not able to follow recipes she has previously made for years, like a quiche, soup, things like that. Her mother had dementia. Age of onset unknown. There were a lot of issues then. Does not do much for relaxation. She cares for her grandchildren two days a week now, it was more before. No previous neuropsych.        Neuropsychological Mental Status Exam (NMSE):      Historian: Good  Praxis: No UE apraxia  R/L Orientation: Intact to self and to other  Dress: within normal limits   Weight: overweight  Appearance/Hygiene: within normal limits   Gait: within normal limits   Assistive Devices: None  Mood: within normal limits   Affect: within normal limits   Comprehension: within normal limits   Thought Process: within normal limits   Expressive Language: within normal limits   Receptive Language: within normal limits   Motor:  No cognitive or motor perseveration  ETOH: Denied  Tobacco: Denied  Marijuana: Denied  Illicit: Denied  SI/HI: Denied  Psychosis: Denied  Insight: Within normal limits  Judgment: Within normal limits  Other Psych:      Past Medical History:   Diagnosis Date    Anxiety     Gluten intolerance     Hypercholesterolemia     Sinus problem        Past Surgical History:   Procedure Laterality Date    HX COLONOSCOPY      HX GYN      endometrial ablation    HX HEENT      wisdom teeth extraction    HX ORTHOPAEDIC Left     left wrist ulnar nerve transposition    HX SHOULDER ARTHROSCOPY Bilateral     HX TONSILLECTOMY      MO REVAGINAL PROLAPSE,UTEROSACRAL Allergies   Allergen Reactions    Aspirin Not Reported This Time    Bupropion Nausea and Vomiting    Floxin [Ofloxacin] Rash    Ibuprofen Swelling    Maxalt [Rizatriptan] Palpitations    Prevacid [Lansoprazole] Rash    Wellbutrin [Bupropion Hcl] Rash       Family History   Problem Relation Age of Onset    Diabetes Mother     Hypertension Mother     Celiac Disease Father     Celiac Disease Brother     Diabetes Maternal Grandmother     No Known Problems Brother     No Known Problems Brother        Social History     Tobacco Use    Smoking status: Never Smoker    Smokeless tobacco: Never Used   Vaping Use    Vaping Use: Never used   Substance Use Topics    Alcohol use: Not Currently     Comment: wine    Drug use: No       Current Outpatient Medications   Medication Sig Dispense Refill    butalbital-acetaminophen-caff (FIORICET) -40 mg per capsule TAKE 1 CAPSULE BY MOUTH EVERY 6 HOURS AS NEEDED FOR PAIN 20 Capsule 1    ALPRAZolam (XANAX) 0.5 mg tablet TAKE 1 TABLET DAILY AS NEEDED FOR ANXIETY 30 Tablet 0    simvastatin (ZOCOR) 10 mg tablet TAKE 1 TABLET BY MOUTH EVERY DAY AT NIGHT 90 Tablet 2    dicyclomine (BENTYL) 10 mg capsule Take 1 Capsule by mouth three (3) times daily as needed for Abdominal Cramps. 30 Capsule 1    B6/folic/B12/coffee/phosphatid (NEURIVA PLUS PO) Take  by mouth.  ascorbic acid (VITAMIN C PO) Take  by mouth.  zinc sulfate (ZINC-15 PO) Take  by mouth.  MAGNESIUM PO Take  by mouth.  guaiFENesin ER (Mucinex) 600 mg ER tablet Take 600 mg by mouth two (2) times a day.  fluticasone propionate (Flonase) 50 mcg/actuation nasal spray 2 Sprays by Both Nostrils route daily. 3 Each 1    albuterol (Ventolin HFA) 90 mcg/actuation inhaler Take 1 Puff by inhalation every four (4) hours as needed for Wheezing.  18 g 5    acetaminophen (TylenoL) 325 mg tablet Tylenol 325 mg tablet   PO in office      famotidine (PEPCID) 20 mg tablet Take 1 Tab by mouth nightly. 90 Tab 1    vitamin e (E GEMS) 100 unit capsule Take  by mouth daily.  cholecalciferol, VITAMIN D3, (VITAMIN D3) 5,000 unit tab tablet Take 1 Tab by mouth two (2) times a day. 180 Tab 1    OTHER Stool softener           Plan:  Obtain authorization for testing from insurance company. Report to follow once testing, scoring, and interpretation completed. ? Organic based neurocognitive issues versus mood disorder or combination of same. ? Problems organic, functional, or both? This note will not be viewable in [address]

## 2022-04-25 DIAGNOSIS — G43.809 OTHER MIGRAINE WITHOUT STATUS MIGRAINOSUS, NOT INTRACTABLE: ICD-10-CM

## 2022-04-25 RX ORDER — BUTALBITAL, ACETAMINOPHEN AND CAFFEINE 300; 40; 50 MG/1; MG/1; MG/1
CAPSULE ORAL
Qty: 20 CAPSULE | Refills: 1 | Status: SHIPPED | OUTPATIENT
Start: 2022-04-25 | End: 2022-05-24

## 2022-05-11 ENCOUNTER — DOCUMENTATION ONLY (OUTPATIENT)
Dept: NEUROLOGY | Age: 67
End: 2022-05-11

## 2022-05-12 ENCOUNTER — OFFICE VISIT (OUTPATIENT)
Dept: NEUROLOGY | Age: 67
End: 2022-05-12
Payer: MEDICARE

## 2022-05-12 DIAGNOSIS — G30.0 EARLY ONSET ALZHEIMER'S DEMENTIA WITHOUT BEHAVIORAL DISTURBANCE (HCC): Primary | ICD-10-CM

## 2022-05-12 DIAGNOSIS — F02.80 EARLY ONSET ALZHEIMER'S DEMENTIA WITHOUT BEHAVIORAL DISTURBANCE (HCC): Primary | ICD-10-CM

## 2022-05-12 DIAGNOSIS — Z63.9 FAMILY DYNAMICS PROBLEM: ICD-10-CM

## 2022-05-12 DIAGNOSIS — F32.A ANXIETY AND DEPRESSION: ICD-10-CM

## 2022-05-12 DIAGNOSIS — F41.9 ANXIETY AND DEPRESSION: ICD-10-CM

## 2022-05-12 PROCEDURE — 96136 PSYCL/NRPSYC TST PHY/QHP 1ST: CPT | Performed by: CLINICAL NEUROPSYCHOLOGIST

## 2022-05-12 PROCEDURE — 96133 NRPSYC TST EVAL PHYS/QHP EA: CPT | Performed by: CLINICAL NEUROPSYCHOLOGIST

## 2022-05-12 PROCEDURE — 96132 NRPSYC TST EVAL PHYS/QHP 1ST: CPT | Performed by: CLINICAL NEUROPSYCHOLOGIST

## 2022-05-12 PROCEDURE — 96137 PSYCL/NRPSYC TST PHY/QHP EA: CPT | Performed by: CLINICAL NEUROPSYCHOLOGIST

## 2022-05-12 PROCEDURE — 96139 PSYCL/NRPSYC TST TECH EA: CPT | Performed by: CLINICAL NEUROPSYCHOLOGIST

## 2022-05-12 PROCEDURE — 96138 PSYCL/NRPSYC TECH 1ST: CPT | Performed by: CLINICAL NEUROPSYCHOLOGIST

## 2022-05-12 SDOH — SOCIAL STABILITY - SOCIAL INSECURITY: PROBLEM RELATED TO PRIMARY SUPPORT GROUP, UNSPECIFIED: Z63.9

## 2022-05-12 NOTE — LETTER
5/17/2022    Patient: Fran Mcarthur   YOB: 1955   Date of Visit: 5/12/2022     Kathi Kelly MD  [address] [facility]  [address]  [address] [facility]  [address]    Dear Kathi Kelly MD,      Thank you for referring Ms. Faustino Briseno to [facility] for evaluation. My notes for this consultation are attached. If you have questions, please do not hesitate to call me. I look forward to following your patient along with you.       Sincerely,    Keanu Alvarez PsyD

## 2022-05-16 RX ORDER — FLUTICASONE PROPIONATE 50 MCG
SPRAY, SUSPENSION (ML) NASAL
Qty: 48 G | Refills: 3 | Status: SHIPPED | OUTPATIENT
Start: 2022-05-16

## 2022-05-17 NOTE — PROGRESS NOTES
[address] [facility],5[address]  [address]   [address] [place] [address] [facility]   [address]   [phone] Office   [phone] Fax      Neuropsychological Evaluation Report    Referral:  Chioma Duval MD    Zabrina Sands is a 69 y.o. right handed (in a relationship)  female who was accompanied by her daughter to the initial clinical interview on 4/6/22/  Please refer to her medical records for details pertaining to her history. At the start of the appointment, I reviewed the patient's [facility] Epic Chart (including Media scanned in from previous providers) for the active Problem List, all pertinent Past Medical Hx, medications, recent radiologic and laboratory findings. In addition, I reviewed pt's documented Immunization Record and Encounter History. 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 Her daughter lives with her, as does her SO. The patient has no history of stroke, meningitis/encephalitis, FELECIA Fever, Lupus, Lyme, TBI, sz. She has a past history of UTIs, but three weeks ago was so bad, got septic, and was in hospital. That was the worst.  She does not feel as though she has a memory issues. She drives without issue, and has no problems with medications, finances, day-to-day chores, etc.  She has chronic anxiety- at least ten years. She takes Xanax once in awhile. She tries not to. Family has noticed progressive changes in short term memory. The biggest concern is repeating herself. It is a lot. She forgets the content of conversations. Loses train of thought. She will tell daughter the same stories over and over. Watches her granddaughter. Will ask a question and a few minutes later will ask the same. This has been going on two years and much worse of late.    Stress can be a tad high, and certainly anxiety can be a factor, though concern is for organic versus functional versus both. Patient watches her other daughter's children, and she can have some cognitive fogginess at times. The daughter questions often what the patient is doing, feels like she is very controlling. Patient wants to help but then nothing ever helps. I did ask her why she continues to place herself in this situation. [place] issues, perhaps? Patient not sure. Not able to follow recipes she has previously made for years, like a quiche, soup, things like that. Her mother had dementia. Age of onset unknown. There were a lot of issues then. Does not do much for relaxation. She cares for her grandchildren two days a week now, it was more before. No previous neuropsych.        Neuropsychological Mental Status Exam (NMSE):      Historian: Good  Praxis: No UE apraxia  R/L Orientation: Intact to self and to other  Dress: within normal limits   Weight: overweight  Appearance/Hygiene: within normal limits   Gait: within normal limits   Assistive Devices: None  Mood: within normal limits   Affect: within normal limits   Comprehension: within normal limits   Thought Process: within normal limits   Expressive Language: within normal limits   Receptive Language: within normal limits   Motor:  No cognitive or motor perseveration  ETOH: Denied  Tobacco: Denied  Marijuana: Denied  Illicit: Denied  SI/HI: Denied  Psychosis: Denied  Insight: Within normal limits  Judgment: Within normal limits  Other Psych:      Past Medical History:   Diagnosis Date    Anxiety     Gluten intolerance     Hypercholesterolemia     Sinus problem        Past Surgical History:   Procedure Laterality Date    HX COLONOSCOPY      HX GYN      endometrial ablation    HX HEENT      wisdom teeth extraction    HX ORTHOPAEDIC Left     left wrist ulnar nerve transposition    HX SHOULDER ARTHROSCOPY Bilateral     HX TONSILLECTOMY      OK REVAGINAL PROLAPSE,UTEROSACRAL         Allergies   Allergen Reactions    Aspirin Not Reported This Time    Bupropion Nausea and Vomiting    Floxin [Ofloxacin] Rash    Ibuprofen Swelling    Maxalt [Rizatriptan] Palpitations    Prevacid [Lansoprazole] Rash    Wellbutrin [Bupropion Hcl] Rash       Family History   Problem Relation Age of Onset    Diabetes Mother     Hypertension Mother     Celiac Disease Father     Celiac Disease Brother     Diabetes Maternal Grandmother     No Known Problems Brother     No Known Problems Brother        Social History     Tobacco Use    Smoking status: Never Smoker    Smokeless tobacco: Never Used   Vaping Use    Vaping Use: Never used   Substance Use Topics    Alcohol use: Not Currently     Comment: wine    Drug use: No       Current Outpatient Medications   Medication Sig Dispense Refill    fluticasone propionate (FLONASE) 50 mcg/actuation nasal spray SPRAY 2 SPRAYS INTO EACH NOSTRIL EVERY DAY 48 g 3    butalbital-acetaminophen-caff (FIORICET) -40 mg per capsule TAKE 1 CAPSULE BY MOUTH EVERY 6 HOURS AS NEEDED FOR PAIN 20 Capsule 1    ALPRAZolam (XANAX) 0.5 mg tablet TAKE 1 TABLET DAILY AS NEEDED FOR ANXIETY 30 Tablet 0    simvastatin (ZOCOR) 10 mg tablet TAKE 1 TABLET BY MOUTH EVERY DAY AT NIGHT 90 Tablet 2    dicyclomine (BENTYL) 10 mg capsule Take 1 Capsule by mouth three (3) times daily as needed for Abdominal Cramps. 30 Capsule 1    B6/folic/B12/coffee/phosphatid (NEURIVA PLUS PO) Take  by mouth.  ascorbic acid (VITAMIN C PO) Take  by mouth.  zinc sulfate (ZINC-15 PO) Take  by mouth.  MAGNESIUM PO Take  by mouth.  guaiFENesin ER (Mucinex) 600 mg ER tablet Take 600 mg by mouth two (2) times a day.  albuterol (Ventolin HFA) 90 mcg/actuation inhaler Take 1 Puff by inhalation every four (4) hours as needed for Wheezing. 18 g 5    acetaminophen (TylenoL) 325 mg tablet Tylenol 325 mg tablet   PO in office      famotidine (PEPCID) 20 mg tablet Take 1 Tab by mouth nightly.  90 Tab 1    vitamin e (E GEMS) 100 unit capsule Take  by mouth daily.  cholecalciferol, VITAMIN D3, (VITAMIN D3) 5,000 unit tab tablet Take 1 Tab by mouth two (2) times a day. 180 Tab 1    OTHER Stool softener           Plan:  Obtain authorization for testing from insurance company. Report to follow once testing, scoring, and interpretation completed. ? Organic based neurocognitive issues versus mood disorder or combination of same. ? Problems organic, functional, or both? This note will not be viewable in [address] Neuropsychological Test Results  Patient Testing 5/2/22 Report Completed 5/17/22  A Psychometrist Assisted w/ portions of this evaluation while under my direct  supervision    The following evaluation procedures/tests were administered:      Neuropsychologist Performed, Interpreted, & Reported:  Neuropsychological Mental Status Exam, Revised Memory & Behavior Checklist,  Mini Mental Status Exam, Clock Drawing Test, Manjit-Melzack Pain Questionnaire, Test Of Premorbid Functioning, History Taking  & Clinical Interview With The Patient, Additional History Taking w/ the Patient's Daughter, NIRMAL, CPT-III, Review Of Available Records. Psychometrist Administered under Neuropsychologist Supervision & Neuropsychologist Interpreted & Neuropsychologist Reported:  Verbal Fluency Tests, Js & Js  Revised, Trailmaking Test Parts A & B, Wechsler Adult Intelligence Scale - IV, [place] [employer]  3, Grooved Pegboard, Barney Depression Inventory  II, Barney Anxiety Inventory. Test Findings:  Test Findings:  Note:  The patients raw data have been compared with currently available norms which include demographic corrections for age, gender, and/or education. Sometimes, the patients scores are compared to demographically similar individuals as close to the patients age, education level, etc., as possible.   \"Average\" is viewed as being +/- 1 standard deviation (SD) from the stated mean for a particular test score. \"Low average\" is viewed as being between 1 and 2 SD below the mean, and above average is viewed as being 1 and 2 SD above the mean. Scores falling in the borderline range (between 1-1/2 and 2 SD below the mean) are viewed with particular attention as to whether they are normal or abnormal neurocognitive test scores. Other methods of inference in analyzing the test data are also utilized, including the pattern and range of scores in the profile, bilateral motor functions, and the presence, if any, of pathognomonic signs. Behaviorally, the patient was friendly and cooperative and appeared motivated to perform well during this examination. Within this context, the results of this evaluation are viewed as a valid reflection of the patients actual neurocognitive and emotional status. The patient's score of 25/30 on the Mini-Mental Status Exam was impaired. In this regard, she was not oriented to year or date. Recall for three words after a brief delay was 1/3 correct. Visual construction was impaired. Naming was 1/2 correct. Three step command was 2/3 correct. Clock drawing was within normal limits. Her structured word list fluency, as assessed by the FAS Test, was within the moderately impaired range with a T score of 27. Category fluency was within the severely impaired range with a T score of 417  Confrontation naming ability, as assessed by the [facility] Revised, was within the mildly to moderately impaired range at 40/60 correct (T = 32). This pattern of performance is indicative of a patient who is at increased risk for day-to-day problems with verbal fluency and confrontation naming. The patient was administered the [facility] Continuous Performance Test  III and review of the subscales within this instrument revealed numerous concerns for inattentiveness without impulsivity.   This pattern of performance is indicative of a patient who is at increased risk for day-to-day problems with sustained visual attention/concentration. The patient is showing problems with working memory capacity (5th %ile) and especially processing speed (1st %ile) on the WAIS-IV. Her Verbal Comprehension Index score of 80 was low average. Her Perceptual Reasoning Index score of 75 was borderline. These scores reflect a decline in functioning based on an assessment of premorbid functioning. The patient was administered the [place] Verbal Learning Test  - 3 and generated an impaired range (and positive) learning curve over five repeated auditory word list learning trials. An interference trial was impaired. Free and cued, short and long delayed recall were all impaired. Recognition recall was impaired. Forced choice recall was normal, suggesting good effort. This pattern of performance is indicative of a patient who is at increased risk for day-to-day problems with auditory learning and/or memory. Simple timed visual motor sequencing (Trailmaking Test Part A) was within the below average range with a T score of 43. Her performance on a similar, but more complex task of timed visual motor sequencing (Trailmaking Test Part B) was within the severely impaired range with a T score of 14. This latter test was discontinued. This pattern of performance is indicative of a patient who is at increased risk for day-to-day problems with executive functioning. Fine motor dexterity was within the normal range bilaterally. This does not raise concern for a particularly lateralized brain dysfunction. The patient rated her current level of pain as \"0/5- No Pain\" on the Manjit-Melzack Pain Questionnaire. She did report periodic pain in her left knee. Her Barney Depression Inventory II score of 11 was within the minimally depressed range. Her Barney Anxiety Inventory score of 14 reflected mild anxiety.   The patient's responses on the Personality Assessment Inventory were deemed valid for interpretation overall. Within this context, she reports anxiety and depression that she is plagued by worry to the degree that her ability to focus and concentrate are significantly compromised. Maladaptive behavior patterns aimed at controlling anxiety are present. Self-concept is harsh and negative. Interpersonally, she is warm, friendly, and sympathetic. Some day-to-day stressors and relationship difficulties are reported. Her self-reported level of treatment motivation is somewhat low. Impressions & Recommendations: This is an abnormal range Neuropsychological Evaluation with respect to neurocognitive functioning. In this regard, she is showing impairments with mental status, verbal fluency, confrontation naming, visual attention, auditory learning, auditory memory, processing speed, working memory, and executive functioning. Casual language skills are an important strength but may serve to mask underlying cognitive deficits at times. Emotionally, there is concern for mild to moderate depression and anxiety. In my opinion, this appears to be a case of moderate dementia exacerbated by mild depression, anxiety, and stress/family dynamics. The profile is inconsistent with pseudodementia, though certainly anxiety/stress/depression will enhance underlying memory problems. Patient lacks insight and struggles with executive functioning, raising concerns about her capacity to independently care for her young grandchildren without some supervision. I suggest consideration for medication for memory, attention, and depression/anxiety. Counseling may prove helpful as well. She should be encouraged to remain as mentally, socially, and physically active as possible. I do not find her competent to make medical decisions, financial decisions, to vote, to , or to own a firearm. A POA should be established if this has not been done so already.  She also likely requires supervision for those domains pertaining to memory. This includes medication management supervision and supervision of financial dealings. I would recommend she hold off on driving, and see if attention medications help with her reaction time and processing speed, at which point I suggest a formal evaluation of driving safety. The family is supportive and so long as they are able to assist with the supervision as noted, I agree with her current living arrangement. Baseline now established. Follow up prn. Clinical correlation is, of course, indicated. I will discuss these findings with the patient when she follows up with me in the near future. A follow up Neuropsychological Evaluation is indicated on a prn basis, especially if there are any cognitive and/or emotional changes. DIAGNOSES:  Dementia - Moderate, Early Onset     Depression and Anxiety - Mild     The above information is based upon information currently available to me. If there is any additional information of which I am currently unaware, I would be more than happy to review it upon having it made available to me. Thank you for the opportunity to see this interesting individual.     Sincerely,       Amanda Noonan. Loretta Conner MD    Time Documentation:    36019*8 79274*0 (60 minutes)    10990 x 1  96139 x 5 Test Administration/Data Gathering By Technician: (3 hours). 58141 x 1 (first 30 minutes), 22031 x 5 (each additional 30 minutes)    96132 x 1  96133 x 1 Testing Evaluation Services by Neuropsychologist (1 hour, 50 minutes) 96132 x 1 (first hour), 96133 x 1 (50 minutes)    Definitions:      77011/09663:  Neurobehavioral Status Exam, Clinical interview.   Clinical assessment of thinking, reasoning and judgment, by neuropsychologist, both face to face time with patient and time interpreting those test results and reporting, first and subsequent hours)    05199/21881: Neuropsychological Test Administration by Technician/Psychometrist, first 30 minutes and each additional 30 minutes. The above includes: Record review. Review of history provided by patient. Review of collaborative information. Testing by Clinician. Review of raw data. Scoring. Report writing of individual tests administered by Clinician. Integration of individual tests administered by psychometrist with NSE/testing by clinician, review of records/history/collaborative information, case Conceptualization, treatment planning, clinical decision making, report writing, coordination Of Care. Psychometry test codes as time spent by psychometrist administering and scoring neurocognitive/psychological tests under supervision of neuropsychologist.  Integral services including scoring of raw data, data interpretation, case conceptualization, report writing etcetera were initiated after the patient finished testing/raw data collected and was completed on the date the report was signed. Please note that this dictation was completed with [employer], the [employer] voice recognition software. Quite often unanticipated grammatical, syntax, homophones, and other interpretive errors are inadvertently transcribed by the computer software. Please disregard these errors. Please excuse any errors that have escaped final proofreading. Thank you.

## 2022-05-23 DIAGNOSIS — G43.809 OTHER MIGRAINE WITHOUT STATUS MIGRAINOSUS, NOT INTRACTABLE: ICD-10-CM

## 2022-05-24 RX ORDER — BUTALBITAL, ACETAMINOPHEN AND CAFFEINE 300; 40; 50 MG/1; MG/1; MG/1
CAPSULE ORAL
Qty: 20 CAPSULE | Refills: 1 | Status: SHIPPED | OUTPATIENT
Start: 2022-05-24 | End: 2022-06-14

## 2022-06-04 DIAGNOSIS — F41.9 ANXIETY: ICD-10-CM

## 2022-06-04 RX ORDER — ALPRAZOLAM 0.5 MG/1
TABLET ORAL
Qty: 30 TABLET | Refills: 0 | Status: SHIPPED | OUTPATIENT
Start: 2022-06-04 | End: 2022-08-01

## 2022-06-09 RX ORDER — OSELTAMIVIR PHOSPHATE 75 MG/1
75 CAPSULE ORAL DAILY
Qty: 10 CAPSULE | Refills: 0 | Status: SHIPPED | OUTPATIENT
Start: 2022-06-09 | End: 2022-06-19

## 2022-06-10 DIAGNOSIS — G43.809 OTHER MIGRAINE WITHOUT STATUS MIGRAINOSUS, NOT INTRACTABLE: ICD-10-CM

## 2022-06-14 RX ORDER — BUTALBITAL, ACETAMINOPHEN AND CAFFEINE 300; 40; 50 MG/1; MG/1; MG/1
CAPSULE ORAL
Qty: 20 CAPSULE | Refills: 1 | Status: SHIPPED | OUTPATIENT
Start: 2022-06-14 | End: 2022-07-01

## 2022-07-01 ENCOUNTER — TELEPHONE (OUTPATIENT)
Dept: INTERNAL MEDICINE CLINIC | Age: 67
End: 2022-07-01

## 2022-07-01 DIAGNOSIS — G43.809 OTHER MIGRAINE WITHOUT STATUS MIGRAINOSUS, NOT INTRACTABLE: ICD-10-CM

## 2022-07-01 RX ORDER — NITROFURANTOIN 25; 75 MG/1; MG/1
100 CAPSULE ORAL 2 TIMES DAILY
Qty: 10 CAPSULE | Refills: 0 | Status: SHIPPED | OUTPATIENT
Start: 2022-07-01 | End: 2022-07-06

## 2022-07-01 RX ORDER — BUTALBITAL, ACETAMINOPHEN AND CAFFEINE 300; 40; 50 MG/1; MG/1; MG/1
CAPSULE ORAL
Qty: 20 CAPSULE | Refills: 1 | Status: SHIPPED | OUTPATIENT
Start: 2022-07-01 | End: 2022-07-06 | Stop reason: ALTCHOICE

## 2022-07-01 NOTE — TELEPHONE ENCOUNTER
Patient daughter called symptoms of  UTI has an appointment for Wednesday morning, but would like to start an antibiotic before it gets to severe as las time she was hospitalized. Patient daughter Kristopher Mchugh also stated she will keep close eye on mom to make sure symptoms don't worsen and will take to ED if needed.

## 2022-07-01 NOTE — TELEPHONE ENCOUNTER
I have sent in nitrofurantoin for her to take twice daily for 5 days.   Keep appointment with me    Adelfo Galeano

## 2022-07-06 ENCOUNTER — OFFICE VISIT (OUTPATIENT)
Dept: INTERNAL MEDICINE CLINIC | Age: 67
End: 2022-07-06
Payer: MEDICARE

## 2022-07-06 VITALS
DIASTOLIC BLOOD PRESSURE: 72 MMHG | SYSTOLIC BLOOD PRESSURE: 123 MMHG | WEIGHT: 165.6 LBS | HEIGHT: 64 IN | TEMPERATURE: 98.1 F | OXYGEN SATURATION: 96 % | HEART RATE: 70 BPM | BODY MASS INDEX: 28.27 KG/M2 | RESPIRATION RATE: 12 BRPM

## 2022-07-06 DIAGNOSIS — F41.9 ANXIETY: ICD-10-CM

## 2022-07-06 DIAGNOSIS — N39.0 RECURRENT UTI: Primary | ICD-10-CM

## 2022-07-06 DIAGNOSIS — R14.0 BLOATING SYMPTOM: ICD-10-CM

## 2022-07-06 DIAGNOSIS — F03.90 DEMENTIA WITHOUT BEHAVIORAL DISTURBANCE, UNSPECIFIED DEMENTIA TYPE: ICD-10-CM

## 2022-07-06 DIAGNOSIS — R10.30 LOWER ABDOMINAL PAIN: ICD-10-CM

## 2022-07-06 LAB
BILIRUB UR QL STRIP: NORMAL
GLUCOSE UR-MCNC: NEGATIVE MG/DL
KETONES P FAST UR STRIP-MCNC: NEGATIVE MG/DL
PH UR STRIP: 7 [PH] (ref 4.6–8)
PROT UR QL STRIP: NORMAL
SP GR UR STRIP: 1.02 (ref 1–1.03)
UA UROBILINOGEN AMB POC: NORMAL (ref 0.2–1)
URINALYSIS CLARITY POC: CLEAR
URINALYSIS COLOR POC: NORMAL
URINE BLOOD POC: NEGATIVE
URINE LEUKOCYTES POC: NEGATIVE
URINE NITRITES POC: NEGATIVE

## 2022-07-06 PROCEDURE — G9899 SCRN MAM PERF RSLTS DOC: HCPCS | Performed by: INTERNAL MEDICINE

## 2022-07-06 PROCEDURE — 3017F COLORECTAL CA SCREEN DOC REV: CPT | Performed by: INTERNAL MEDICINE

## 2022-07-06 PROCEDURE — 1101F PT FALLS ASSESS-DOCD LE1/YR: CPT | Performed by: INTERNAL MEDICINE

## 2022-07-06 PROCEDURE — G8536 NO DOC ELDER MAL SCRN: HCPCS | Performed by: INTERNAL MEDICINE

## 2022-07-06 PROCEDURE — G8417 CALC BMI ABV UP PARAM F/U: HCPCS | Performed by: INTERNAL MEDICINE

## 2022-07-06 PROCEDURE — G8427 DOCREV CUR MEDS BY ELIG CLIN: HCPCS | Performed by: INTERNAL MEDICINE

## 2022-07-06 PROCEDURE — G8510 SCR DEP NEG, NO PLAN REQD: HCPCS | Performed by: INTERNAL MEDICINE

## 2022-07-06 PROCEDURE — 99214 OFFICE O/P EST MOD 30 MIN: CPT | Performed by: INTERNAL MEDICINE

## 2022-07-06 PROCEDURE — G8399 PT W/DXA RESULTS DOCUMENT: HCPCS | Performed by: INTERNAL MEDICINE

## 2022-07-06 PROCEDURE — 81001 URINALYSIS AUTO W/SCOPE: CPT | Performed by: INTERNAL MEDICINE

## 2022-07-06 PROCEDURE — 1090F PRES/ABSN URINE INCON ASSESS: CPT | Performed by: INTERNAL MEDICINE

## 2022-07-06 NOTE — PROGRESS NOTES
Crystal Jurado is a 69 y.o. female who presents today for Bladder Infection (RM18// pt presents today for continued urinary frequency and pressure )  . She has a history of   Patient Active Problem List   Diagnosis Code    Hyperlipidemia E78.5    Gastroesophageal reflux disease K21.9    Allergic rhinitis J30.9    Anxiety F41.9   . Today patient is here for follow up. UTI: hx of recurrent UTI. Last week began having recurrent lower abd pain. Bloated feeling. This has been recurrent since feb. No changes in bowel habits. No medication changes. Occasional dark urine. Work-up for abdominal discomfort includes celiac panel thyroid routine blood work which was all within normal limits with exception of an elevated white blood cell count in the past.  She denies any recent fevers. Denies any other systemic s/s of infection. Dementia: Patient has been evaluated by neuropsych and findings are consistent with moderate dementia. Today she seems a bit more confused than normal.  Unable to recall exact timeframe of symptoms. I reviewed neuropsych testing with patient and she reports has not reviewed results. Did have an appointment with neurology which was missed. At home with daughter, friend, and 2 grandchildren. We discussed continuing to limit benzodiazepine use.  verified and she is filling approximately 30 tablets every 3 months. We discussed potential need for preventative medications for anxiety and depression. She would like for us to discuss this with her daughter present. ROS  Review of Systems   Constitutional: Negative for chills, fever and weight loss. HENT: Negative for congestion and sore throat. Eyes: Negative for blurred vision, double vision and photophobia. Respiratory: Negative for cough and shortness of breath. Cardiovascular: Negative for chest pain, palpitations and leg swelling. Gastrointestinal: Positive for abdominal pain.  Negative for constipation, diarrhea, heartburn, nausea and vomiting. Bloating   Genitourinary: Negative for dysuria, frequency and urgency. Musculoskeletal: Negative for back pain, joint pain, myalgias and neck pain. Skin: Negative for rash. Neurological: Negative. Negative for headaches. Endo/Heme/Allergies: Does not bruise/bleed easily. Psychiatric/Behavioral: Positive for memory loss. Negative for depression and suicidal ideas. Visit Vitals  /72 (BP 1 Location: Left upper arm, BP Patient Position: Sitting, BP Cuff Size: Large adult)   Pulse 70   Temp 98.1 °F (36.7 °C) (Oral)   Resp 12   Ht 5' 4\" (1.626 m)   Wt 165 lb 9.6 oz (75.1 kg)   SpO2 96%   BMI 28.43 kg/m²       Physical Exam  Constitutional:       Appearance: She is well-developed. HENT:      Head: Normocephalic and atraumatic. Right Ear: Tympanic membrane normal.      Left Ear: Tympanic membrane normal.      Nose: Nose normal.   Cardiovascular:      Rate and Rhythm: Normal rate and regular rhythm. Heart sounds: No murmur heard. Pulmonary:      Effort: Pulmonary effort is normal. No respiratory distress. Abdominal:      General: Abdomen is flat. Palpations: Abdomen is soft. Comments: No guarding. Skin:     General: Skin is warm and dry. Neurological:      Mental Status: She is alert and oriented to person, place, and time. Psychiatric:         Behavior: Behavior normal.           Current Outpatient Medications   Medication Sig    cranberry fruit concentrate (AZO CRANBERRY PO) Take  by mouth.  nitrofurantoin, macrocrystal-monohydrate, (MACROBID) 100 mg capsule Take 1 Capsule by mouth two (2) times a day for 5 days.     ALPRAZolam (XANAX) 0.5 mg tablet TAKE 1 TABLET BY MOUTH DAILY AS NEEDED FOR ANXIETY    fluticasone propionate (FLONASE) 50 mcg/actuation nasal spray SPRAY 2 SPRAYS INTO EACH NOSTRIL EVERY DAY    simvastatin (ZOCOR) 10 mg tablet TAKE 1 TABLET BY MOUTH EVERY DAY AT NIGHT    dicyclomine (BENTYL) 10 mg capsule Take 1 Capsule by mouth three (3) times daily as needed for Abdominal Cramps.  B6/folic/B12/coffee/phosphatid (NEURIVA PLUS PO) Take  by mouth.  ascorbic acid (VITAMIN C PO) Take  by mouth.  zinc sulfate (ZINC-15 PO) Take  by mouth.  MAGNESIUM PO Take  by mouth.  albuterol (Ventolin HFA) 90 mcg/actuation inhaler Take 1 Puff by inhalation every four (4) hours as needed for Wheezing.  acetaminophen (TylenoL) 325 mg tablet Tylenol 325 mg tablet   PO in office    famotidine (PEPCID) 20 mg tablet Take 1 Tab by mouth nightly.  vitamin e (E GEMS) 100 unit capsule Take  by mouth daily.  cholecalciferol, VITAMIN D3, (VITAMIN D3) 5,000 unit tab tablet Take 1 Tab by mouth two (2) times a day.  OTHER Stool softener     No current facility-administered medications for this visit.         Past Medical History:   Diagnosis Date    Anxiety     Gluten intolerance     Hypercholesterolemia     Sinus problem       Past Surgical History:   Procedure Laterality Date    HX COLONOSCOPY      HX GYN      endometrial ablation    HX HEENT      wisdom teeth extraction    HX ORTHOPAEDIC Left     left wrist ulnar nerve transposition    HX SHOULDER ARTHROSCOPY Bilateral     HX TONSILLECTOMY      [place] REVAGINAL PROLAPSE,UTEROSACRAL        Social History     Tobacco Use    Smoking status: Never Smoker    Smokeless tobacco: Never Used   Substance Use Topics    Alcohol use: Not Currently     Comment: wine      Family History   Problem Relation Age of Onset    Diabetes Mother     Hypertension Mother     Celiac Disease Father     Celiac Disease Brother     Diabetes Maternal Grandmother     No Known Problems Brother     No Known Problems Brother         Allergies   Allergen Reactions    Aspirin Not Reported This Time    Bupropion Nausea and Vomiting    Floxin [Ofloxacin] Rash    Ibuprofen Swelling    Maxalt [Rizatriptan] Palpitations    Prevacid [Lansoprazole] Rash    Wellbutrin [Bupropion Hcl] Rash        Assessment/Plan  Diagnoses and all orders for this visit:    1. Recurrent UTI-UA unremarkable today. Not enough urine for culture. Will finish current antibiotics. .  Given ongoing chronic lower abdominal cramping and discomfort, will order CT scan. Blood work evaluation in February was negative. This included a celiac panel. Repeat basic blood work today.  -     CT ABD PELV W CONT; Future  -     METABOLIC PANEL, COMPREHENSIVE; Future  -     CBC WITH AUTOMATED DIFF; Future  -     CULTURE, URINE; Future  -     AMB POC URINALYSIS DIP STICK AUTO W/ MICRO    2. Lower abdominal pain  -     CT ABD PELV W CONT; Future  -     METABOLIC PANEL, COMPREHENSIVE; Future  -     CBC WITH AUTOMATED DIFF; Future    3. Bloating symptom  -     CT ABD PELV W CONT; Future    4. Dementia without behavioral disturbance, unspecified dementia type (HCC)-moderate per neuropsych evaluation. Discussed briefly with patient's her memory symptoms and the need to limit the use of alprazolam.  Anxiety, though high, she reports is relatively stable. Does have good support at home. We will make an appointment with her and her daughter in about a month to make sure that we can review this diagnosis and what needs to change to keep her healthy and safe. 5. Anxiety-see above            Earnest Ceballos MD  7/6/2022    This note was created with the help of speech recognition software Delia Blank) and may contain some 'sound alike' errors.

## 2022-07-06 NOTE — PROGRESS NOTES
Apolinar Granda  Identified pt with two pt identifiers(name and ). Chief Complaint   Patient presents with    Bladder Infection     RM18// pt presents today for continued urinary frequency and pressure        1. Have you been to the ER, urgent care clinic since your last visit? Hospitalized since your last visit? NO    2. Have you seen or consulted any other health care providers outside of the [address] [place] [address] since your last visit? Include any pap smears or colon screening. NO      Provider notified of reason for visit, vitals and flowsheets obtained on patients.      Patient received paperwork for advance directive during previous visit but has not completed at this time     Reviewed record In preparation for visit, huddled with provider and have obtained necessary documentation      Health Maintenance Due   Topic    COVID-19 Vaccine (1)    DTaP/Tdap/Td series (2 - Td or Tdap)    Medicare Yearly Exam        Wt Readings from Last 3 Encounters:   22 165 lb 9.6 oz (75.1 kg)   22 168 lb 6.4 oz (76.4 kg)   22 167 lb 12.8 oz (76.1 kg)     Temp Readings from Last 3 Encounters:   22 98.1 °F (36.7 °C) (Oral)   22 97.7 °F (36.5 °C) (Oral)   22 97.8 °F (36.6 °C) (Oral)     BP Readings from Last 3 Encounters:   22 123/72   22 (!) 152/77   22 (!) 167/79     Pulse Readings from Last 3 Encounters:   22 70   22 68   22 65     Vitals:    22 0730   BP: 123/72   Pulse: 70   Resp: 12   Temp: 98.1 °F (36.7 °C)   TempSrc: Oral   SpO2: 96%   Weight: 165 lb 9.6 oz (75.1 kg)   Height: 5' 4\" (1.626 m)   PainSc:   0 - No pain         Learning Assessment:  :     Learning Assessment 10/14/2020   PRIMARY LEARNER Patient   HIGHEST LEVEL OF EDUCATION - PRIMARY LEARNER  [employer]   BARRIERS PRIMARY LEARNER NONE   CO-LEARNER CAREGIVER No   PRIMARY LANGUAGE ENGLISH   LEARNER PREFERENCE PRIMARY OTHER (COMMENT)   ANSWERED BY patient    RELATIONSHIP SELF Depression Screening:  :     3 most recent PHQ Screens 7/6/2022   Little interest or pleasure in doing things Several days   Feeling down, depressed, irritable, or hopeless Several days   Total Score PHQ 2 2   Trouble falling or staying asleep, or sleeping too much -   Feeling tired or having little energy -   Poor appetite, weight loss, or overeating -   Feeling bad about yourself - or that you are a failure or have let yourself or your family down -   Trouble concentrating on things such as school, work, reading, or watching TV -   Moving or speaking so slowly that other people could have noticed; or the opposite being so fidgety that others notice -   Thoughts of being better off dead, or hurting yourself in some way -   PHQ 9 Score -   How difficult have these problems made it for you to do your work, take care of your home and get along with others -       Fall Risk Assessment:  :     Fall Risk Assessment, last 12 mths 4/27/2021   Able to walk? Yes   Fall in past 12 months? 0   Do you feel unsteady? 0   Are you worried about falling 0       Abuse Screening:  :     Abuse Screening Questionnaire 4/27/2021 2/24/2021 4/30/2020 4/7/2020 10/3/2019 12/4/2018 8/8/2018   Do you ever feel afraid of your partner? N N N N N N N   Are you in a relationship with someone who physically or mentally threatens you? N N N N N N N   Is it safe for you to go home? Y Y Y Y Y Y Y       ADL Screening:  :     No flowsheet data found. Medication reconciliation up to date and corrected with patient at this time.

## 2022-07-07 LAB
ALBUMIN SERPL-MCNC: 4.6 G/DL (ref 3.8–4.8)
ALBUMIN/GLOB SERPL: 2 {RATIO} (ref 1.2–2.2)
ALP SERPL-CCNC: 123 IU/L (ref 44–121)
ALT SERPL-CCNC: 20 IU/L (ref 0–32)
AST SERPL-CCNC: 27 IU/L (ref 0–40)
BASOPHILS # BLD AUTO: 0 X10E3/UL (ref 0–0.2)
BASOPHILS NFR BLD AUTO: 1 %
BILIRUB SERPL-MCNC: 0.5 MG/DL (ref 0–1.2)
BUN SERPL-MCNC: 8 MG/DL (ref 8–27)
BUN/CREAT SERPL: 10 (ref 12–28)
CALCIUM SERPL-MCNC: 9.3 MG/DL (ref 8.7–10.3)
CHLORIDE SERPL-SCNC: 104 MMOL/L (ref 96–106)
CO2 SERPL-SCNC: 25 MMOL/L (ref 20–29)
CREAT SERPL-MCNC: 0.8 MG/DL (ref 0.57–1)
EGFR: 80 ML/MIN/1.73
EOSINOPHIL # BLD AUTO: 0.1 X10E3/UL (ref 0–0.4)
EOSINOPHIL NFR BLD AUTO: 1 %
ERYTHROCYTE [DISTWIDTH] IN BLOOD BY AUTOMATED COUNT: 13.3 % (ref 11.7–15.4)
GLOBULIN SER CALC-MCNC: 2.3 G/DL (ref 1.5–4.5)
GLUCOSE SERPL-MCNC: 105 MG/DL (ref 65–99)
HCT VFR BLD AUTO: 42.7 % (ref 34–46.6)
HGB BLD-MCNC: 14.1 G/DL (ref 11.1–15.9)
IMM GRANULOCYTES # BLD AUTO: 0 X10E3/UL (ref 0–0.1)
IMM GRANULOCYTES NFR BLD AUTO: 0 %
LYMPHOCYTES # BLD AUTO: 1 X10E3/UL (ref 0.7–3.1)
LYMPHOCYTES NFR BLD AUTO: 20 %
MCH RBC QN AUTO: 29.3 PG (ref 26.6–33)
MCHC RBC AUTO-ENTMCNC: 33 G/DL (ref 31.5–35.7)
MCV RBC AUTO: 89 FL (ref 79–97)
MONOCYTES # BLD AUTO: 0.5 X10E3/UL (ref 0.1–0.9)
MONOCYTES NFR BLD AUTO: 11 %
NEUTROPHILS # BLD AUTO: 3.3 X10E3/UL (ref 1.4–7)
NEUTROPHILS NFR BLD AUTO: 67 %
PLATELET # BLD AUTO: 267 X10E3/UL (ref 150–450)
POTASSIUM SERPL-SCNC: 4.4 MMOL/L (ref 3.5–5.2)
PROT SERPL-MCNC: 6.9 G/DL (ref 6–8.5)
RBC # BLD AUTO: 4.82 X10E6/UL (ref 3.77–5.28)
SODIUM SERPL-SCNC: 142 MMOL/L (ref 134–144)
WBC # BLD AUTO: 4.9 X10E3/UL (ref 3.4–10.8)

## 2022-07-13 ENCOUNTER — HOSPITAL ENCOUNTER (OUTPATIENT)
Dept: CT IMAGING | Age: 67
Discharge: HOME | End: 2022-07-13
Attending: INTERNAL MEDICINE
Payer: MEDICARE

## 2022-07-13 DIAGNOSIS — R14.0 BLOATING SYMPTOM: ICD-10-CM

## 2022-07-13 DIAGNOSIS — N39.0 RECURRENT UTI: ICD-10-CM

## 2022-07-13 DIAGNOSIS — R10.30 LOWER ABDOMINAL PAIN: ICD-10-CM

## 2022-07-13 PROCEDURE — 74011000636 HC RX REV CODE- 636: Performed by: INTERNAL MEDICINE

## 2022-07-13 PROCEDURE — 74177 CT ABD & PELVIS W/CONTRAST: CPT

## 2022-07-13 RX ADMIN — IOPAMIDOL 100 ML: 755 INJECTION, SOLUTION INTRAVENOUS at 15:06

## 2022-07-14 NOTE — PROGRESS NOTES
Patient states she feels better. If any symptoms or different pain arises she will contact the office.

## 2022-07-23 DIAGNOSIS — G43.809 OTHER MIGRAINE WITHOUT STATUS MIGRAINOSUS, NOT INTRACTABLE: ICD-10-CM

## 2022-07-23 RX ORDER — BUTALBITAL, ACETAMINOPHEN AND CAFFEINE 300; 40; 50 MG/1; MG/1; MG/1
CAPSULE ORAL
Qty: 20 CAPSULE | Refills: 1 | Status: SHIPPED | OUTPATIENT
Start: 2022-07-23 | End: 2022-08-12

## 2022-07-26 ENCOUNTER — OFFICE VISIT (OUTPATIENT)
Dept: NEUROLOGY | Age: 67
End: 2022-07-26

## 2022-07-26 DIAGNOSIS — Z91.199 NO-SHOW FOR APPOINTMENT: Primary | ICD-10-CM

## 2022-08-01 DIAGNOSIS — F41.9 ANXIETY: ICD-10-CM

## 2022-08-01 RX ORDER — ALPRAZOLAM 0.5 MG/1
TABLET ORAL
Qty: 30 TABLET | Refills: 0 | Status: SHIPPED | OUTPATIENT
Start: 2022-08-01 | End: 2022-09-25 | Stop reason: SDUPTHER

## 2022-08-12 ENCOUNTER — TELEPHONE (OUTPATIENT)
Dept: INTERNAL MEDICINE CLINIC | Age: 67
End: 2022-08-12

## 2022-08-12 DIAGNOSIS — N39.0 RECURRENT UTI: Primary | ICD-10-CM

## 2022-08-12 DIAGNOSIS — B37.9 YEAST INFECTION: ICD-10-CM

## 2022-08-12 DIAGNOSIS — G43.809 OTHER MIGRAINE WITHOUT STATUS MIGRAINOSUS, NOT INTRACTABLE: ICD-10-CM

## 2022-08-12 RX ORDER — BUTALBITAL, ACETAMINOPHEN AND CAFFEINE 300; 40; 50 MG/1; MG/1; MG/1
CAPSULE ORAL
Qty: 20 CAPSULE | Refills: 1 | Status: SHIPPED | OUTPATIENT
Start: 2022-08-12 | End: 2022-08-30

## 2022-08-12 RX ORDER — FLUCONAZOLE 150 MG/1
150 TABLET ORAL DAILY
Qty: 1 TABLET | Refills: 0 | Status: SHIPPED | OUTPATIENT
Start: 2022-08-12 | End: 2022-08-13

## 2022-08-12 NOTE — TELEPHONE ENCOUNTER
Patient called about a yeast infection and was hoping to get something called to their pharmacy to alleviate symptoms. Please call patient if there are any issues or questions.

## 2022-08-12 NOTE — TELEPHONE ENCOUNTER
Spoke with pt and informed her that Dr Vidhi Kelley approved sending in diflucan to her local pharmacy. Pt states gratitude and understanding of medication instructions.

## 2022-08-12 NOTE — TELEPHONE ENCOUNTER
Pt was seen for UTI with Dr Poppy Moya and given ABX, pt now having yeast infection symptoms. Verbal ok to send in diflucan from Dr Lazarus Kempf.

## 2022-08-23 ENCOUNTER — OFFICE VISIT (OUTPATIENT)
Dept: NEUROLOGY | Age: 67
End: 2022-08-23
Payer: MEDICARE

## 2022-08-23 DIAGNOSIS — Z63.9 FAMILY DYNAMICS PROBLEM: ICD-10-CM

## 2022-08-23 DIAGNOSIS — F02.80 EARLY ONSET ALZHEIMER'S DEMENTIA WITHOUT BEHAVIORAL DISTURBANCE (HCC): Primary | ICD-10-CM

## 2022-08-23 DIAGNOSIS — F32.A ANXIETY AND DEPRESSION: ICD-10-CM

## 2022-08-23 DIAGNOSIS — F41.9 ANXIETY AND DEPRESSION: ICD-10-CM

## 2022-08-23 DIAGNOSIS — G30.0 EARLY ONSET ALZHEIMER'S DEMENTIA WITHOUT BEHAVIORAL DISTURBANCE (HCC): Primary | ICD-10-CM

## 2022-08-23 PROCEDURE — 90832 PSYTX W PT 30 MINUTES: CPT | Performed by: CLINICAL NEUROPSYCHOLOGIST

## 2022-08-23 PROCEDURE — 1123F ACP DISCUSS/DSCN MKR DOCD: CPT | Performed by: CLINICAL NEUROPSYCHOLOGIST

## 2022-08-23 SDOH — SOCIAL STABILITY - SOCIAL INSECURITY: PROBLEM RELATED TO PRIMARY SUPPORT GROUP, UNSPECIFIED: Z63.9

## 2022-08-23 NOTE — PROGRESS NOTES
Prior to seeing the patient I reviewed the records, including the previously completed report, the records in [facility], and any updated visits from other providers since I saw the patient last.      Today, I engaged in a psychoeducational and supportive and cognitive/behavioral psychotherapy session with the patient and daughter. I provided psychotherapy in the form of psychoeducation and support with respect to the results of the recent Neuropsychological Evaluation, including discussing individual tests as well as patient's areas of neurocognitive strength versus weakness. We discussed, in detail, the following: This is an abnormal range Neuropsychological Evaluation with respect to neurocognitive functioning. In this regard, she is showing impairments with mental status, verbal fluency, confrontation naming, visual attention, auditory learning, auditory memory, processing speed, working memory, and executive functioning. Casual language skills are an important strength but may serve to mask underlying cognitive deficits at times. Emotionally, there is concern for mild to moderate depression and anxiety. In my opinion, this appears to be a case of moderate dementia exacerbated by mild depression, anxiety, and stress/family dynamics. The profile is inconsistent with pseudodementia, though certainly anxiety/stress/depression will enhance underlying memory problems. Patient lacks insight and struggles with executive functioning, raising concerns about her capacity to independently care for her young grandchildren without some supervision. I suggest consideration for medication for memory, attention, and depression/anxiety. Counseling may prove helpful as well. She should be encouraged to remain as mentally, socially, and physically active as possible. I do not find her competent to make medical decisions, financial decisions, to vote, to , or to own a firearm.   A POA should be established if this has not been done so already. She also likely requires supervision for those domains pertaining to memory. This includes medication management supervision and supervision of financial dealings. I would recommend she hold off on driving, and see if attention medications help with her reaction time and processing speed, at which point I suggest a formal evaluation of driving safety. The family is supportive and so long as they are able to assist with the supervision as noted, I agree with her current living arrangement. Baseline now established. Follow up prn. Clinical correlation is, of course, indicated. I will discuss these findings with the patient when she follows up with me in the near future. A follow up Neuropsychological Evaluation is indicated on a prn basis, especially if there are any cognitive and/or emotional changes. DIAGNOSES:             Dementia - Moderate, Early Onset                                      Depression and Anxiety - Mild       Education was provided regarding my diagnostic impressions, and we discussed treatment plan/options. I also answered numerous questions related to the clinical findings, including discussing various methods to improve cognition and mood. Counseling provided regarding mood and cognition. CBT and supportive psychotherapy techniques were utilized. Supportive/Cognitive Behavioral/Solution Focused psychotherapy provided  Discussed rational versus irrational thinking patterns and their consequences. Discussed healthy/adaptive and unhealthy/maladaptive coping. Emotional support provided. The patient needs to follow with Dr. Heather Burgos or set up with Neuro or both. Did not have a good experience with PCP last time and would like to have a treatment provider that she can get along with so I am sending to neuro for dementia treatment and she can discuss other concerns with her PCP.  Follow up here yearly.        The patient had the following concerns which I deferred to their referring provider: meds for mood/cognition      Time spent today: 20

## 2022-08-28 DIAGNOSIS — F41.9 ANXIETY: ICD-10-CM

## 2022-08-28 RX ORDER — ALPRAZOLAM 0.5 MG/1
TABLET ORAL
Qty: 30 TABLET | Refills: 0 | OUTPATIENT
Start: 2022-08-28

## 2022-08-30 DIAGNOSIS — G43.809 OTHER MIGRAINE WITHOUT STATUS MIGRAINOSUS, NOT INTRACTABLE: ICD-10-CM

## 2022-08-30 RX ORDER — BUTALBITAL, ACETAMINOPHEN AND CAFFEINE 300; 40; 50 MG/1; MG/1; MG/1
CAPSULE ORAL
Qty: 20 CAPSULE | Refills: 1 | Status: SHIPPED | OUTPATIENT
Start: 2022-08-30 | End: 2022-09-19

## 2022-09-19 DIAGNOSIS — G43.809 OTHER MIGRAINE WITHOUT STATUS MIGRAINOSUS, NOT INTRACTABLE: ICD-10-CM

## 2022-09-19 RX ORDER — BUTALBITAL, ACETAMINOPHEN AND CAFFEINE 300; 40; 50 MG/1; MG/1; MG/1
CAPSULE ORAL
Qty: 20 CAPSULE | Refills: 1 | Status: SHIPPED | OUTPATIENT
Start: 2022-09-19 | End: 2022-10-08

## 2022-09-25 DIAGNOSIS — F41.9 ANXIETY: ICD-10-CM

## 2022-09-26 DIAGNOSIS — R10.9 ABDOMINAL PAIN, UNSPECIFIED ABDOMINAL LOCATION: ICD-10-CM

## 2022-09-26 RX ORDER — ALPRAZOLAM 0.5 MG/1
TABLET ORAL
Qty: 30 TABLET | Refills: 0 | Status: SHIPPED | OUTPATIENT
Start: 2022-09-26

## 2022-09-27 RX ORDER — DICYCLOMINE HYDROCHLORIDE 10 MG/1
10 CAPSULE ORAL
Qty: 30 CAPSULE | Refills: 1 | Status: SHIPPED | OUTPATIENT
Start: 2022-09-27

## 2022-10-04 ENCOUNTER — OFFICE VISIT (OUTPATIENT)
Dept: INTERNAL MEDICINE CLINIC | Age: 67
End: 2022-10-04
Payer: MEDICARE

## 2022-10-04 VITALS
OXYGEN SATURATION: 98 % | HEART RATE: 66 BPM | BODY MASS INDEX: 28.6 KG/M2 | WEIGHT: 167.5 LBS | RESPIRATION RATE: 18 BRPM | TEMPERATURE: 98.8 F | DIASTOLIC BLOOD PRESSURE: 65 MMHG | HEIGHT: 64 IN | SYSTOLIC BLOOD PRESSURE: 125 MMHG

## 2022-10-04 DIAGNOSIS — F41.9 ANXIETY: Primary | ICD-10-CM

## 2022-10-04 DIAGNOSIS — R03.0 ELEVATED BP WITHOUT DIAGNOSIS OF HYPERTENSION: ICD-10-CM

## 2022-10-04 DIAGNOSIS — F03.90 DEMENTIA, UNSPECIFIED DEMENTIA SEVERITY, UNSPECIFIED DEMENTIA TYPE, UNSPECIFIED WHETHER BEHAVIORAL, PSYCHOTIC, OR MOOD DISTURBANCE OR ANXIETY (HCC): ICD-10-CM

## 2022-10-04 DIAGNOSIS — E78.2 MIXED HYPERLIPIDEMIA: ICD-10-CM

## 2022-10-04 PROCEDURE — 1101F PT FALLS ASSESS-DOCD LE1/YR: CPT | Performed by: INTERNAL MEDICINE

## 2022-10-04 PROCEDURE — 99214 OFFICE O/P EST MOD 30 MIN: CPT | Performed by: INTERNAL MEDICINE

## 2022-10-04 PROCEDURE — 3017F COLORECTAL CA SCREEN DOC REV: CPT | Performed by: INTERNAL MEDICINE

## 2022-10-04 PROCEDURE — G8432 DEP SCR NOT DOC, RNG: HCPCS | Performed by: INTERNAL MEDICINE

## 2022-10-04 PROCEDURE — G8428 CUR MEDS NOT DOCUMENT: HCPCS | Performed by: INTERNAL MEDICINE

## 2022-10-04 PROCEDURE — G8536 NO DOC ELDER MAL SCRN: HCPCS | Performed by: INTERNAL MEDICINE

## 2022-10-04 PROCEDURE — G9899 SCRN MAM PERF RSLTS DOC: HCPCS | Performed by: INTERNAL MEDICINE

## 2022-10-04 PROCEDURE — G8417 CALC BMI ABV UP PARAM F/U: HCPCS | Performed by: INTERNAL MEDICINE

## 2022-10-04 PROCEDURE — G8399 PT W/DXA RESULTS DOCUMENT: HCPCS | Performed by: INTERNAL MEDICINE

## 2022-10-04 PROCEDURE — 1090F PRES/ABSN URINE INCON ASSESS: CPT | Performed by: INTERNAL MEDICINE

## 2022-10-04 RX ORDER — PLANT STANOL ESTER 450 MG
8000 TABLET ORAL DAILY
COMMUNITY

## 2022-10-04 RX ORDER — ACETAMINOPHEN, DEXTROMETHORPHAN HYDROBROMIDE, DOXYLAMINE SUCCINATE 500; 15; 6.25 MG/15ML; MG/15ML; MG/15ML
LIQUID ORAL
COMMUNITY

## 2022-10-04 RX ORDER — SIMETHICONE 125 MG
125 CAPSULE ORAL
COMMUNITY

## 2022-10-04 RX ORDER — MINERAL OIL
ENEMA (ML) RECTAL
COMMUNITY

## 2022-10-04 RX ORDER — SERTRALINE HYDROCHLORIDE 25 MG/1
25 TABLET, FILM COATED ORAL DAILY
Qty: 30 TABLET | Refills: 2 | Status: SHIPPED | OUTPATIENT
Start: 2022-10-04

## 2022-10-04 NOTE — PROGRESS NOTES
Gretel Paredes is a 69 y.o. female who presents today for Medication Evaluation (Would like to discuss anxiety rx) and Skin Problem (Sores around the mouth x several days; using salt ad water )  . She has a history of   Patient Active Problem List   Diagnosis Code    Hyperlipidemia E78.5    Gastroesophageal reflux disease K21.9    Allergic rhinitis J30.9    Anxiety F41.9   . Today patient is here for follow up. Anxiety: We have been treating her with as needed benzodiazepine. We have discussed limiting the use of this medication. Last refill was in early August though we did send a new refill last week. She is agreeable to starting on SSRI. Daughter has benefited from Zoloft in the past.    Dementia: Mild to moderate per neuropsych evaluation. She does have an appointment with neurology to discussed therapy. This is scheduled for October 21. She does report that her first she was upset and angry regarding this diagnosis, but she has learned to accept this. She has made some changes in her life and overall her life is safe and stable. She does have good support in her family members. Does have a very strong family history of dementia    Home blood pressure readings are stable. Remains on statin therapy. Last lipids done earlier this year    ROS  Review of Systems   Constitutional:  Negative for chills, fever and weight loss. HENT:  Negative for congestion and sore throat. Eyes:  Negative for blurred vision, double vision and photophobia. Respiratory:  Negative for cough and shortness of breath. Cardiovascular:  Negative for chest pain, palpitations and leg swelling. Gastrointestinal:  Negative for abdominal pain, constipation, diarrhea, heartburn, nausea and vomiting. Genitourinary:  Negative for dysuria, frequency and urgency. Musculoskeletal:  Negative for joint pain and myalgias. Skin:  Negative for rash. Neurological: Negative. Negative for headaches. Endo/Heme/Allergies:  Does not bruise/bleed easily. Psychiatric/Behavioral:  Positive for memory loss. Negative for suicidal ideas. The patient is nervous/anxious. Visit Vitals  /65   Pulse 66   Temp 98.8 °F (37.1 °C) (Oral)   Resp 18   Ht 5' 4\" (1.626 m)   Wt 167 lb 8 oz (76 kg)   SpO2 98%   BMI 28.75 kg/m²       Physical Exam  Constitutional:       Appearance: She is well-developed. HENT:      Head: Normocephalic and atraumatic. Cardiovascular:      Rate and Rhythm: Normal rate and regular rhythm. Heart sounds: No murmur heard. Pulmonary:      Effort: Pulmonary effort is normal. No respiratory distress. Musculoskeletal:      Comments: Seeing ortho for knee   Skin:     General: Skin is warm and dry. Neurological:      Mental Status: She is alert and oriented to person, place, and time. Psychiatric:         Behavior: Behavior normal.         Current Outpatient Medications   Medication Sig    vitamin A 2,400 mcg capsule Take 8,000 Units by mouth daily. simethicone (GAS-X) 125 mg capsule Take 125 mg by mouth four (4) times daily as needed for Flatulence. phenylephrine-acetaminophen (Sinus Congestion and Pain) 5-325 mg tab Take  by mouth. PRN    fexofenadine (ALLEGRA) 180 mg tablet Take  by mouth. sertraline (ZOLOFT) 25 mg tablet Take 1 Tablet by mouth daily. dicyclomine (BENTYL) 10 mg capsule Take 1 Capsule by mouth three (3) times daily as needed for Abdominal Cramps. ALPRAZolam (XANAX) 0.5 mg tablet TAKE 1 TABLET BY MOUTH EVERY DAY AS NEEDED FOR ANXIETY    butalbital-acetaminophen-caff (FIORICET) -40 mg per capsule TAKE 1 CAPSULE BY MOUTH EVERY 6 HOURS AS NEEDED FOR PAIN    cranberry fruit concentrate (AZO CRANBERRY PO) Take  by mouth. fluticasone propionate (FLONASE) 50 mcg/actuation nasal spray SPRAY 2 SPRAYS INTO EACH NOSTRIL EVERY DAY    simvastatin (ZOCOR) 10 mg tablet TAKE 1 TABLET BY MOUTH EVERY DAY AT NIGHT    ascorbic acid (VITAMIN C PO) Take  by mouth. zinc sulfate (ZINC-15 PO) Take  by mouth. MAGNESIUM PO Take  by mouth. albuterol (Ventolin HFA) 90 mcg/actuation inhaler Take 1 Puff by inhalation every four (4) hours as needed for Wheezing. acetaminophen (TYLENOL) 325 mg tablet Tylenol 325 mg tablet   PO in office    famotidine (PEPCID) 20 mg tablet Take 1 Tab by mouth nightly. cholecalciferol, VITAMIN D3, (VITAMIN D3) 5,000 unit tab tablet Take 1 Tab by mouth two (2) times a day. (Patient taking differently: Take 5,000 Units by mouth daily.)    B6/folic/B12/coffee/phosphatid (NEURIVA PLUS PO) Take  by mouth. (Patient not taking: Reported on 10/4/2022)    vitamin e (E GEMS) 100 unit capsule Take  by mouth daily. (Patient not taking: Reported on 10/4/2022)    OTHER Stool softener (Patient not taking: Reported on 10/4/2022)     No current facility-administered medications for this visit.         Past Medical History:   Diagnosis Date    Anxiety     Dementia ([facility] [address]     Gluten intolerance     Hypercholesterolemia     Sinus problem       Past Surgical History:   Procedure Laterality Date    HX COLONOSCOPY      HX GYN      endometrial ablation    HX HEENT      wisdom teeth extraction    HX ORTHOPAEDIC Left     left wrist ulnar nerve transposition    HX SHOULDER ARTHROSCOPY Bilateral     HX TONSILLECTOMY      [place] REVAGINAL PROLAPSE,UTEROSACRAL        Social History     Tobacco Use    Smoking status: Never    Smokeless tobacco: Never   Substance Use Topics    Alcohol use: Not Currently     Comment: wine      Family History   Problem Relation Age of Onset    Diabetes Mother     Hypertension Mother     Celiac Disease Father     Celiac Disease Brother     Diabetes Maternal Grandmother     No Known Problems Brother     No Known Problems Brother         Allergies   Allergen Reactions    Aspirin Not Reported This Time    Bupropion Nausea and Vomiting    Floxin [Ofloxacin] Rash    Ibuprofen Swelling    Maxalt [Rizatriptan] Palpitations    Prevacid [Lansoprazole] Rash Wellbutrin [Bupropion Hcl] Rash        Assessment/Plan  Diagnoses and all orders for this visit:    1. Anxiety-Long conversation about the dangers of benzodiazepine use and dementia. We will try to limit the use and start her on low-dose SSRI. Follow-up in with me in 8 weeks. -     sertraline (ZOLOFT) 25 mg tablet; Take 1 Tablet by mouth daily. 2. Dementia, unspecified dementia severity, unspecified dementia type, unspecified whether behavioral, psychotic, or mood disturbance or anxiety (HCC)-Long conversation about the diagnosis and the prognosis. Patient will be meeting with neurology to further discuss. Also referred her to [employer] for considerations of clinical trials. Verbal fluency is still quite good. 3. Elevated BP without diagnosis of hypertension-Home readings stable    4. Mixed hyperlipidemia-lipids controlled          Marko Ramirez MD  10/4/2022    This note was created with the help of speech recognition software CLARED) and may contain some 'sound alike' errors.

## 2022-10-04 NOTE — PROGRESS NOTES
Chief Complaint   Patient presents with    Medication Evaluation     Would like to discuss anxiety rx    Skin Problem     Sores around the mouth x several days; using salt ad water        Vitals:    10/04/22 1312   BP: (!) 166/76   Pulse: 66   Resp: 18   Temp: 98.8 °F (37.1 °C)   TempSrc: Oral   SpO2: 98%   Weight: 167 lb 8 oz (76 kg)   Height: 5' 4\" (1.626 m)   PainSc:   0 - No pain         Health Maintenance will be followed up by PCP. 1. Have you been to the ER, urgent care clinic since your last visit? Hospitalized since your last visit? No    2. Have you seen or consulted any other health care providers outside of the [address] [place] [address] since your last visit? Include any pap smears or colon screening.  No

## 2022-10-05 ENCOUNTER — PATIENT MESSAGE (OUTPATIENT)
Dept: INTERNAL MEDICINE CLINIC | Age: 67
End: 2022-10-05

## 2022-10-07 DIAGNOSIS — G43.809 OTHER MIGRAINE WITHOUT STATUS MIGRAINOSUS, NOT INTRACTABLE: ICD-10-CM

## 2022-10-08 RX ORDER — BUTALBITAL, ACETAMINOPHEN AND CAFFEINE 300; 40; 50 MG/1; MG/1; MG/1
CAPSULE ORAL
Qty: 20 CAPSULE | Refills: 1 | Status: SHIPPED | OUTPATIENT
Start: 2022-10-08 | End: 2022-10-24

## 2022-10-21 ENCOUNTER — OFFICE VISIT (OUTPATIENT)
Dept: NEUROLOGY | Age: 67
End: 2022-10-21
Payer: MEDICARE

## 2022-10-21 VITALS — WEIGHT: 164 LBS | BODY MASS INDEX: 28.15 KG/M2 | DIASTOLIC BLOOD PRESSURE: 98 MMHG | SYSTOLIC BLOOD PRESSURE: 146 MMHG

## 2022-10-21 DIAGNOSIS — F02.80 EARLY ONSET ALZHEIMER'S DEMENTIA WITHOUT BEHAVIORAL DISTURBANCE (HCC): Primary | ICD-10-CM

## 2022-10-21 DIAGNOSIS — G30.0 EARLY ONSET ALZHEIMER'S DEMENTIA WITHOUT BEHAVIORAL DISTURBANCE (HCC): Primary | ICD-10-CM

## 2022-10-21 PROCEDURE — G8427 DOCREV CUR MEDS BY ELIG CLIN: HCPCS | Performed by: NURSE PRACTITIONER

## 2022-10-21 PROCEDURE — 3017F COLORECTAL CA SCREEN DOC REV: CPT | Performed by: NURSE PRACTITIONER

## 2022-10-21 PROCEDURE — 1090F PRES/ABSN URINE INCON ASSESS: CPT | Performed by: NURSE PRACTITIONER

## 2022-10-21 PROCEDURE — G8417 CALC BMI ABV UP PARAM F/U: HCPCS | Performed by: NURSE PRACTITIONER

## 2022-10-21 PROCEDURE — 1101F PT FALLS ASSESS-DOCD LE1/YR: CPT | Performed by: NURSE PRACTITIONER

## 2022-10-21 PROCEDURE — 99205 OFFICE O/P NEW HI 60 MIN: CPT | Performed by: NURSE PRACTITIONER

## 2022-10-21 PROCEDURE — G9899 SCRN MAM PERF RSLTS DOC: HCPCS | Performed by: NURSE PRACTITIONER

## 2022-10-21 PROCEDURE — G8536 NO DOC ELDER MAL SCRN: HCPCS | Performed by: NURSE PRACTITIONER

## 2022-10-21 PROCEDURE — G8399 PT W/DXA RESULTS DOCUMENT: HCPCS | Performed by: NURSE PRACTITIONER

## 2022-10-21 PROCEDURE — G8432 DEP SCR NOT DOC, RNG: HCPCS | Performed by: NURSE PRACTITIONER

## 2022-10-21 PROCEDURE — 1123F ACP DISCUSS/DSCN MKR DOCD: CPT | Performed by: NURSE PRACTITIONER

## 2022-10-21 RX ORDER — DONEPEZIL HYDROCHLORIDE 10 MG/1
10 TABLET, FILM COATED ORAL
Qty: 30 TABLET | Refills: 5 | Status: SHIPPED | OUTPATIENT
Start: 2022-10-21

## 2022-10-21 RX ORDER — DONEPEZIL HYDROCHLORIDE 5 MG/1
5 TABLET, FILM COATED ORAL
Qty: 30 TABLET | Refills: 0 | Status: SHIPPED | OUTPATIENT
Start: 2022-10-21 | End: 2022-11-21

## 2022-10-21 NOTE — PROGRESS NOTES
Pt know memory results, Dr. Rachel Mehta went over with family   Zoloft med started by PCP which is calming pt down and giving clarity   Short term memory, repeating  Interested in memory drug and what to expect in the coming months

## 2022-10-24 DIAGNOSIS — G43.809 OTHER MIGRAINE WITHOUT STATUS MIGRAINOSUS, NOT INTRACTABLE: ICD-10-CM

## 2022-10-24 RX ORDER — BUTALBITAL, ACETAMINOPHEN AND CAFFEINE 300; 40; 50 MG/1; MG/1; MG/1
CAPSULE ORAL
Qty: 20 CAPSULE | Refills: 1 | Status: SHIPPED | OUTPATIENT
Start: 2022-10-24 | End: 2022-11-18

## 2022-10-24 NOTE — PROGRESS NOTES
Jordana Del Rio is a 69 y.o. female who presents with the following  Chief Complaint   Patient presents with    New Patient       HPI    New patient for memory loss   She is accompanied with daughter  Diagnosed with early onset dementia   We discussed this in full and they have no questions   She does feel like things are going well   She is very alert and oriented. Daughter agrees things are going well   She is a nurse by trade  She is staying mentally active   Also physically active. She is eating well, sleeping well   No safety concerns   Looking to get started with medication and further evaluate  No images, EEG  Did have blood. She has no other questions today       Allergies   Allergen Reactions    Aspirin Not Reported This Time    Bupropion Nausea and Vomiting    Floxin [Ofloxacin] Rash    Ibuprofen Swelling    Maxalt [Rizatriptan] Palpitations    Prevacid [Lansoprazole] Rash    Wellbutrin [Bupropion Hcl] Rash       Current Outpatient Medications   Medication Sig    donepeziL (ARICEPT) 5 mg tablet Take 1 Tablet by mouth nightly. X 1 months. Then increase to 10 mg nightly. donepeziL (ARICEPT) 10 mg tablet Take 1 Tablet by mouth nightly. vitamin A 2,400 mcg capsule Take 8,000 Units by mouth daily. simethicone (GAS-X) 125 mg capsule Take 125 mg by mouth four (4) times daily as needed for Flatulence. phenylephrine-acetaminophen (Sinus Congestion and Pain) 5-325 mg tab Take  by mouth. PRN    fexofenadine (ALLEGRA) 180 mg tablet Take  by mouth. sertraline (ZOLOFT) 25 mg tablet Take 1 Tablet by mouth daily. dicyclomine (BENTYL) 10 mg capsule Take 1 Capsule by mouth three (3) times daily as needed for Abdominal Cramps. ALPRAZolam (XANAX) 0.5 mg tablet TAKE 1 TABLET BY MOUTH EVERY DAY AS NEEDED FOR ANXIETY    cranberry fruit concentrate (AZO CRANBERRY PO) Take  by mouth.     fluticasone propionate (FLONASE) 50 mcg/actuation nasal spray SPRAY 2 SPRAYS INTO EACH NOSTRIL EVERY DAY    simvastatin (ZOCOR) 10 mg tablet TAKE 1 TABLET BY MOUTH EVERY DAY AT NIGHT    ascorbic acid (VITAMIN C PO) Take  by mouth. zinc sulfate (ZINC-15 PO) Take  by mouth. MAGNESIUM PO Take  by mouth. albuterol (Ventolin HFA) 90 mcg/actuation inhaler Take 1 Puff by inhalation every four (4) hours as needed for Wheezing. acetaminophen (TYLENOL) 325 mg tablet Tylenol 325 mg tablet   PO in office    famotidine (PEPCID) 20 mg tablet Take 1 Tab by mouth nightly. cholecalciferol, VITAMIN D3, (VITAMIN D3) 5,000 unit tab tablet Take 1 Tab by mouth two (2) times a day. (Patient taking differently: Take 5,000 Units by mouth daily.)    butalbital-acetaminophen-caff (FIORICET) -40 mg per capsule TAKE 1 CAPSULE BY MOUTH EVERY 6 HOURS AS NEEDED FOR PAIN    OTHER Stool softener (Patient not taking: Reported on 10/4/2022)     No current facility-administered medications for this visit.        Social History     Tobacco Use   Smoking Status Never   Smokeless Tobacco Never       Past Medical History:   Diagnosis Date    Anxiety     Dementia ([facility] [address]     Gluten intolerance     Hypercholesterolemia     Sinus problem        Past Surgical History:   Procedure Laterality Date    HX COLONOSCOPY      HX GYN      endometrial ablation    HX HEENT      wisdom teeth extraction    HX ORTHOPAEDIC Left     left wrist ulnar nerve transposition    HX SHOULDER ARTHROSCOPY Bilateral     HX TONSILLECTOMY      [place] REVAGINAL PROLAPSE,UTEROSACRAL         Family History   Problem Relation Age of Onset    Diabetes Mother     Hypertension Mother     Celiac Disease Father     Celiac Disease Brother     Diabetes Maternal Grandmother     No Known Problems Brother     No Known Problems Brother        Social History     Socioeconomic History    Marital status:    Tobacco Use    Smoking status: Never    Smokeless tobacco: Never   Vaping Use    Vaping Use: Never used   Substance and Sexual Activity    Alcohol use: Not Currently     Comment: wine    Drug use: No    Sexual activity: Never       Review of Systems   Constitutional:  Positive for malaise/fatigue. Eyes:  Negative for blurred vision, double vision and photophobia. Respiratory:  Negative for shortness of breath and wheezing. Cardiovascular:  Negative for chest pain and palpitations. Musculoskeletal:  Negative for falls. Neurological:  Negative for dizziness, tingling and headaches. Psychiatric/Behavioral:  Positive for memory loss. Negative for depression, hallucinations, substance abuse and suicidal ideas. The patient is not nervous/anxious and does not have insomnia. Remainder of comprehensive review is negative. Physical Exam :    Visit Vitals  BP (!) 146/98 (BP 1 Location: Left upper arm, BP Patient Position: Sitting, BP Cuff Size: Adult)   Wt 74.4 kg (164 lb)   BMI 28.15 kg/m²       General: Well defined, nourished, and groomed individual in no acute distress. Musculoskeletal: Extremities revealed no edema and had full range of motion of joints. Psych: Good mood and bright affect    NEUROLOGICAL EXAMINATION:    Mental Status: Alert and oriented to person, place, and time    Cranial Nerves:    II, III, IV, VI: Visual acuity grossly intact. Visual fields are normal.    Pupils are equal, round, and reactive to light and accommodation. Extra-ocular movements are full and fluid. Fundoscopic exam was benign, no ptosis or nystagmus. V-XII: Hearing is grossly intact. Facial features are symmetric, with normal sensation and strength. The palate rises symmetrically and the tongue protrudes midline. Sternocleidomastoids 5/5. Motor Examination: Normal tone, bulk, and strength, 5/5 muscle strength throughout. Coordination: Finger to nose was normal. No resting or intention tremor    Gait and Station: Steady while walking. Normal arm swing. No pronator drift. No muscle wasting or fasiculations noted. Reflexes: DTRs 2+ throughout.       Results for orders placed or performed in visit on 53/14/60   METABOLIC PANEL, COMPREHENSIVE   Result Value Ref Range    Glucose 105 (H) 65 - 99 mg/dL    BUN 8 8 - 27 mg/dL    Creatinine 0.80 0.57 - 1.00 mg/dL    eGFR 80 >59 mL/min/1.73    BUN/Creatinine ratio 10 (L) 12 - 28    Sodium 142 134 - 144 mmol/L    Potassium 4.4 3.5 - 5.2 mmol/L    Chloride 104 96 - 106 mmol/L    CO2 25 20 - 29 mmol/L    Calcium 9.3 8.7 - 10.3 mg/dL    Protein, total 6.9 6.0 - 8.5 g/dL    Albumin 4.6 3.8 - 4.8 g/dL    GLOBULIN, TOTAL 2.3 1.5 - 4.5 g/dL    A-G Ratio 2.0 1.2 - 2.2    Bilirubin, total 0.5 0.0 - 1.2 mg/dL    Alk. phosphatase 123 (H) 44 - 121 IU/L    AST (SGOT) 27 0 - 40 IU/L    ALT (SGPT) 20 0 - 32 IU/L   CBC WITH AUTOMATED DIFF   Result Value Ref Range    WBC 4.9 3.4 - 10.8 x10E3/uL    RBC 4.82 3.77 - 5.28 x10E6/uL    HGB 14.1 11.1 - 15.9 g/dL    HCT 42.7 34.0 - 46.6 %    MCV 89 79 - 97 fL    MCH 29.3 26.6 - 33.0 pg    MCHC 33.0 31.5 - 35.7 g/dL    RDW 13.3 11.7 - 15.4 %    PLATELET 551 677 - 041 x10E3/uL    NEUTROPHILS 67 Not Estab. %    Lymphocytes 20 Not Estab. %    MONOCYTES 11 Not Estab. %    EOSINOPHILS 1 Not Estab. %    BASOPHILS 1 Not Estab. %    ABS. NEUTROPHILS 3.3 1.4 - 7.0 x10E3/uL    Abs Lymphocytes 1.0 0.7 - 3.1 x10E3/uL    ABS. MONOCYTES 0.5 0.1 - 0.9 x10E3/uL    ABS. EOSINOPHILS 0.1 0.0 - 0.4 x10E3/uL    ABS. BASOPHILS 0.0 0.0 - 0.2 x10E3/uL    IMMATURE GRANULOCYTES 0 Not Estab. %    ABS. IMM.  GRANS. 0.0 0.0 - 0.1 x10E3/uL   AMB POC URINALYSIS DIP STICK AUTO W/ MICRO   Result Value Ref Range    Color (UA POC) Dark Yellow     Clarity (UA POC) Clear     Glucose (UA POC) Negative Negative    Bilirubin (UA POC) 1+ Negative    Ketones (UA POC) Negative Negative    Specific gravity (UA POC) 1.020 1.001 - 1.035    Blood (UA POC) Negative Negative    pH (UA POC) 7.0 4.6 - 8.0    Protein (UA POC) 1+ Negative    Urobilinogen (UA POC) 0.2 mg/dL 0.2 - 1    Nitrites (UA POC) Negative Negative    Leukocyte esterase (UA POC) Negative Negative       Orders Placed This Encounter    MRI BRAIN W WO CONT     Standing Status:   Future     Standing Expiration Date:   11/21/2023     Order Specific Question:   STAT Creatinine as indicated     Answer:   Yes    EEG AMB NEURO     Order Specific Question:   Reason for Exam:     Answer:   memory loss    donepeziL (ARICEPT) 5 mg tablet     Sig: Take 1 Tablet by mouth nightly. X 1 months. Then increase to 10 mg nightly. Dispense:  30 Tablet     Refill:  0    donepeziL (ARICEPT) 10 mg tablet     Sig: Take 1 Tablet by mouth nightly. Dispense:  30 Tablet     Refill:  5       1. Early onset Alzheimer's dementia without behavioral disturbance ([facility] [address]        Discussed memory testing   Start Aricept 5 mg X 1 month   Aricept 10 mg thereafter  Discussed starting Namenda after this   Then something for attention deficit  Continue to stay active and engaged. Discussed prognosis, expectations   No other questions. Will get an MRI brain to rule out vascular, stroke, tumor, lesion   EEG to look at epilepsy, brainwaves as causes  FU 8 weeks.              This note will not be viewable in [employer]

## 2022-11-10 ENCOUNTER — HOSPITAL ENCOUNTER (OUTPATIENT)
Dept: MRI IMAGING | Age: 67
Discharge: HOME | End: 2022-11-10
Attending: NURSE PRACTITIONER
Payer: MEDICARE

## 2022-11-10 VITALS — WEIGHT: 173 LBS | BODY MASS INDEX: 29.7 KG/M2

## 2022-11-10 DIAGNOSIS — G30.0 EARLY ONSET ALZHEIMER'S DEMENTIA WITHOUT BEHAVIORAL DISTURBANCE (HCC): ICD-10-CM

## 2022-11-10 DIAGNOSIS — F02.80 EARLY ONSET ALZHEIMER'S DEMENTIA WITHOUT BEHAVIORAL DISTURBANCE (HCC): ICD-10-CM

## 2022-11-10 PROCEDURE — 70553 MRI BRAIN STEM W/O & W/DYE: CPT

## 2022-11-10 PROCEDURE — 74011250636 HC RX REV CODE- 250/636: Performed by: RADIOLOGY

## 2022-11-10 PROCEDURE — A9576 INJ PROHANCE MULTIPACK: HCPCS | Performed by: RADIOLOGY

## 2022-11-10 RX ADMIN — GADOTERIDOL 15 ML: 279.3 INJECTION, SOLUTION INTRAVENOUS at 14:34

## 2022-11-17 DIAGNOSIS — G43.809 OTHER MIGRAINE WITHOUT STATUS MIGRAINOSUS, NOT INTRACTABLE: ICD-10-CM

## 2022-11-18 RX ORDER — BUTALBITAL, ACETAMINOPHEN AND CAFFEINE 300; 40; 50 MG/1; MG/1; MG/1
CAPSULE ORAL
Qty: 20 CAPSULE | Refills: 1 | Status: SHIPPED | OUTPATIENT
Start: 2022-11-18 | End: 2022-11-25

## 2022-11-21 RX ORDER — DONEPEZIL HYDROCHLORIDE 5 MG/1
5 TABLET, FILM COATED ORAL
Qty: 30 TABLET | Refills: 0 | Status: SHIPPED | OUTPATIENT
Start: 2022-11-21

## 2022-11-23 ENCOUNTER — OFFICE VISIT (OUTPATIENT)
Dept: NEUROLOGY | Age: 67
End: 2022-11-23

## 2022-11-23 DIAGNOSIS — F41.9 ANXIETY: ICD-10-CM

## 2022-11-23 DIAGNOSIS — F02.80 EARLY ONSET ALZHEIMER'S DEMENTIA WITHOUT BEHAVIORAL DISTURBANCE (HCC): Primary | ICD-10-CM

## 2022-11-23 DIAGNOSIS — G30.0 EARLY ONSET ALZHEIMER'S DEMENTIA WITHOUT BEHAVIORAL DISTURBANCE (HCC): Primary | ICD-10-CM

## 2022-11-23 RX ORDER — ALPRAZOLAM 0.5 MG/1
TABLET ORAL
Qty: 30 TABLET | Refills: 0 | Status: SHIPPED | OUTPATIENT
Start: 2022-11-23

## 2022-11-24 DIAGNOSIS — G43.809 OTHER MIGRAINE WITHOUT STATUS MIGRAINOSUS, NOT INTRACTABLE: ICD-10-CM

## 2022-11-25 ENCOUNTER — TELEPHONE (OUTPATIENT)
Dept: INTERNAL MEDICINE CLINIC | Age: 67
End: 2022-11-25

## 2022-11-25 DIAGNOSIS — F41.9 ANXIETY: ICD-10-CM

## 2022-11-25 RX ORDER — SERTRALINE HYDROCHLORIDE 50 MG/1
50 TABLET, FILM COATED ORAL DAILY
Qty: 30 TABLET | Refills: 1 | Status: SHIPPED | OUTPATIENT
Start: 2022-11-25

## 2022-11-25 RX ORDER — BUTALBITAL, ACETAMINOPHEN AND CAFFEINE 300; 40; 50 MG/1; MG/1; MG/1
CAPSULE ORAL
Qty: 20 CAPSULE | Refills: 1 | Status: SHIPPED | OUTPATIENT
Start: 2022-11-25

## 2022-11-25 NOTE — TELEPHONE ENCOUNTER
----- Message from Resendiz Zayda sent at 11/23/2022  4:56 PM EST -----  Subject: Medication Problem    Medication: sertraline (ZOLOFT) 25 mg tablet  Dosage: 1 daily  Ordering Provider: karol    Question/Problem: Daughter Leroy Phillips says that the pt needs a increase on the   medication because the dosage that she has right now is not working for   anxiety. Daughter says that it has already been discussed with the Dr.   that she may need a increase as well. Pharmacy: [facility][id number] - [address]  AT   [address]     ---------------------------------------------------------------------------  --------------   [facility]  [phone]; OK to leave message on voicemail  ---------------------------------------------------------------------------  --------------    SCRIPT ANSWERS  Relationship to Patient: Other  Representative Name: Neeraj Mckeon  Is the Representative on the appropriate HIPAA document in Epic: Yes

## 2022-11-25 NOTE — TELEPHONE ENCOUNTER
Spoke with daughter and really grateful for medication change and she has scheduled an appointment for January.

## 2022-11-30 NOTE — PROCEDURES
[facility] AT [place]   EEG Report    Procedure ID: [id number] Procedure Date: 11/23/2022   Patient Name: Jody Zepeda YOB: 1955   Procedure Type: Routine Medical Record No: [id number]       An EEG is requested in this 76-year-old lady to evaluate for epileptiform normalities. Medication schedule include Aricept, Fioricet, phenylephrine, Zoloft, Bentyl, Xanax    This tracing is obtained during the awake state. During wakefulness the background consists of diffuse low voltage fast frequency beta wave activity. Hyperventilation is not performed. Intermittent photic stimulation little alters the tracing. Sleep is not attained. Throughout the tracing irregular heart rate is noted    Interpretation  This EEG recorded during the awake state is normal.  No epileptiform abnormalities are seen. Note of irregular heart rate which should be evaluated as clinically indicated.         Paty Walsh MD

## 2022-12-08 ENCOUNTER — VIRTUAL VISIT (OUTPATIENT)
Dept: INTERNAL MEDICINE CLINIC | Age: 67
End: 2022-12-08
Payer: MEDICARE

## 2022-12-08 DIAGNOSIS — J32.9 SINUSITIS, UNSPECIFIED CHRONICITY, UNSPECIFIED LOCATION: Primary | ICD-10-CM

## 2022-12-08 DIAGNOSIS — F03.90 DEMENTIA, UNSPECIFIED DEMENTIA SEVERITY, UNSPECIFIED DEMENTIA TYPE, UNSPECIFIED WHETHER BEHAVIORAL, PSYCHOTIC, OR MOOD DISTURBANCE OR ANXIETY (HCC): ICD-10-CM

## 2022-12-08 RX ORDER — CEFDINIR 300 MG/1
300 CAPSULE ORAL 2 TIMES DAILY
Qty: 14 CAPSULE | Refills: 0 | Status: SHIPPED | OUTPATIENT
Start: 2022-12-08 | End: 2022-12-15

## 2022-12-08 RX ORDER — GUAIFENESIN 600 MG/1
600 TABLET, EXTENDED RELEASE ORAL 2 TIMES DAILY
Qty: 10 TABLET | Refills: 0 | Status: SHIPPED | OUTPATIENT
Start: 2022-12-08 | End: 2022-12-13

## 2022-12-08 NOTE — PROGRESS NOTES
Lukas Varela was seen on 12/8/2022 using synchronous (real-time) audio-video technology; doxy. me. Consent: Lukas Varela, who was seen by synchronous (real-time) audio-video technology, and/or her healthcare decision maker, is aware that this patient-initiated, Telehealth encounter on 12/8/2022 is a billable service, with coverage as determined by her insurance carrier. She is aware that she may receive a bill and has provided verbal consent to proceed: Yes. I was in the office while conducting this encounter. Lukas Varela is a 69 y.o. female who presents today for Sinus Infection (VV// ptp resenting today with head congestion, sore throat from post nasal drip, coughing)  . She has a history of   Patient Active Problem List   Diagnosis Code    Hyperlipidemia E78.5    Gastroesophageal reflux disease K21.9    Allergic rhinitis J30.9    Anxiety F41.9   . Today patient is being seen for an acute visit. she does not have other concerns. Upper respiratory illness:  Lukas Varela presents with complaints of congestion, dry cough, myalgias, headache, bilateral sinus pain, and hoarseness for 7 days. no nausea and no vomiting . she has not had  fever and chills. Symptoms are moderate. Cough more in the morning. Over-the-counter remedies including Nasal spray. Drinking plenty of fluids: yes  Asthma?:  no  non-smoker  Contacts with similar infections: yes, children. Daughter is helping with medication mmgt given her dementia but she does not believe that any medications have been changed    ROS  Review of Systems   Constitutional:  Negative for chills, fever and weight loss. HENT:  Positive for congestion. Negative for sore throat. Eyes:  Negative for blurred vision, double vision and photophobia. Respiratory:  Negative for cough and shortness of breath. Cardiovascular:  Negative for chest pain, palpitations and leg swelling.    Gastrointestinal:  Negative for abdominal pain, constipation, diarrhea, heartburn, nausea and vomiting. Genitourinary:  Negative for dysuria, frequency and urgency. Musculoskeletal:  Negative for joint pain and myalgias. Skin:  Negative for rash. Neurological: Negative. Negative for headaches. Endo/Heme/Allergies:  Does not bruise/bleed easily. Psychiatric/Behavioral:  Negative for memory loss and suicidal ideas. There were no vitals taken for this visit. Patient-Reported Vitals 12/8/2022   Patient-Reported Weight -   Patient-Reported Height -   Patient-Reported Pulse -   Patient-Reported Temperature 98.0   Patient-Reported Systolic  -   Patient-Reported Diastolic -        Physical Exam  Constitutional:       Appearance: Normal appearance. HENT:      Head: Normocephalic and atraumatic. Pulmonary:      Effort: Pulmonary effort is normal.   Neurological:      General: No focal deficit present. Mental Status: She is alert. Psychiatric:         Mood and Affect: Mood normal.         Behavior: Behavior normal.         Current Outpatient Medications   Medication Sig    sertraline (ZOLOFT) 50 mg tablet Take 1 Tablet by mouth daily. ALPRAZolam (XANAX) 0.5 mg tablet TAKE 1 TABLET BY MOUTH EVERY DAY AS NEEDED FOR ANXIETY    donepeziL (ARICEPT) 10 mg tablet Take 1 Tablet by mouth nightly. vitamin A 2,400 mcg capsule Take 8,000 Units by mouth daily. fexofenadine (ALLEGRA) 180 mg tablet Take  by mouth.    cranberry fruit concentrate (AZO CRANBERRY PO) Take  by mouth. fluticasone propionate (FLONASE) 50 mcg/actuation nasal spray SPRAY 2 SPRAYS INTO EACH NOSTRIL EVERY DAY    simvastatin (ZOCOR) 10 mg tablet TAKE 1 TABLET BY MOUTH EVERY DAY AT NIGHT    ascorbic acid (VITAMIN C PO) Take  by mouth. zinc sulfate (ZINC-15 PO) Take  by mouth. MAGNESIUM PO Take  by mouth. albuterol (Ventolin HFA) 90 mcg/actuation inhaler Take 1 Puff by inhalation every four (4) hours as needed for Wheezing.     acetaminophen (TYLENOL) 325 mg tablet Tylenol 325 mg tablet   PO in office    famotidine (PEPCID) 20 mg tablet Take 1 Tab by mouth nightly. cholecalciferol, VITAMIN D3, (VITAMIN D3) 5,000 unit tab tablet Take 1 Tab by mouth two (2) times a day. (Patient taking differently: Take 5,000 Units by mouth daily.)    butalbital-acetaminophen-caff (FIORICET) -40 mg per capsule TAKE 1 CAPSULE BY MOUTH EVERY 6 HOURS AS NEEDED FOR PAIN (Patient not taking: Reported on 12/8/2022)    donepeziL (ARICEPT) 5 mg tablet TAKE 1 TABLET BY MOUTH NIGHTLY. X 1 MONTHS. THEN INCREASE TO 10 MG NIGHTLY.    simethicone (GAS-X) 125 mg capsule Take 125 mg by mouth four (4) times daily as needed for Flatulence. (Patient not taking: Reported on 12/8/2022)    phenylephrine-acetaminophen (Sinus Congestion and Pain) 5-325 mg tab Take  by mouth. PRN (Patient not taking: Reported on 12/8/2022)    dicyclomine (BENTYL) 10 mg capsule Take 1 Capsule by mouth three (3) times daily as needed for Abdominal Cramps. (Patient not taking: Reported on 12/8/2022)    OTHER Stool softener (Patient not taking: Reported on 12/8/2022)     No current facility-administered medications for this visit.         Past Medical History:   Diagnosis Date    Anxiety     Dementia ([address]     Gluten intolerance     Hypercholesterolemia     Sinus problem       Past Surgical History:   Procedure Laterality Date    HX COLONOSCOPY      HX GYN      endometrial ablation    HX HEENT      wisdom teeth extraction    HX ORTHOPAEDIC Left     left wrist ulnar nerve transposition    HX SHOULDER ARTHROSCOPY Bilateral     HX TONSILLECTOMY      [place] REVAGINAL PROLAPSE,UTEROSACRAL        Social History     Tobacco Use    Smoking status: Never    Smokeless tobacco: Never   Substance Use Topics    Alcohol use: Not Currently     Comment: wine      Family History   Problem Relation Age of Onset    Diabetes Mother     Hypertension Mother     Celiac Disease Father     Celiac Disease Brother     Diabetes Maternal Grandmother     No Known Problems Brother     No Known Problems Brother         Allergies   Allergen Reactions    Aspirin Not Reported This Time    Bupropion Nausea and Vomiting    Floxin [Ofloxacin] Rash    Ibuprofen Swelling    Maxalt [Rizatriptan] Palpitations    Prevacid [Lansoprazole] Rash    Wellbutrin [Bupropion Hcl] Rash        Assessment/Plan  Diagnoses and all orders for this visit:    1. Sinusitis, unspecified chronicity, unspecified location-given history and duration will treat for 7 days. -     cefdinir (OMNICEF) 300 mg capsule; Take 1 Capsule by mouth two (2) times a day for 7 days.  -     guaiFENesin ER (MUCINEX) 600 mg ER tablet; Take 1 Tablet by mouth two (2) times a day for 5 days. 2. Dementia, unspecified dementia severity, unspecified dementia type, unspecified whether behavioral, psychotic, or mood disturbance or anxiety (HCC)-daughter is helping out with medication management and safety. Jordana Del Rio, was evaluated through a synchronous (real-time) audio-video encounter. The patient (or guardian if applicable) is aware that this is a billable service, which includes applicable co-pays. This Virtual Visit was conducted with patient's (and/or legal guardian's) consent. The visit was conducted pursuant to the emergency declaration under the [address] [place] [address] authority and the [employer] General Act. Patient identification was verified, and a caregiver was present when appropriate. The patient was located in a state where the provider was licensed to provide care. Sabina Campos MD  12/8/2022    This note was created with the help of speech recognition software Shweta Isbell) and may contain some 'sound alike' errors.

## 2022-12-08 NOTE — PROGRESS NOTES
Wan Young  Identified pt with two pt identifiers(name and ). Chief Complaint   Patient presents with    Sinus Infection     VV// ptp resenting today with head congestion, sore throat from post nasal drip, coughing       1. Have you been to the ER, urgent care clinic since your last visit? Hospitalized since your last visit? NO    2. Have you seen or consulted any other health care providers outside of the [address] [place] [address] since your last visit? Include any pap smears or colon screening. NO      Provider notified of reason for visit, vitals and flowsheets obtained on patients. Patient received paperwork for advance directive during previous visit but has not completed at this time     Reviewed record In preparation for visit, huddled with provider and have obtained necessary documentation      Health Maintenance Due   Topic    COVID-19 Vaccine (1)    DTaP/Tdap/Td series (2 - Td or Tdap)    Medicare Yearly Exam     Flu Vaccine (1)       Wt Readings from Last 3 Encounters:   11/10/22 173 lb (78.5 kg)   10/21/22 164 lb (74.4 kg)   10/04/22 167 lb 8 oz (76 kg)     Temp Readings from Last 3 Encounters:   10/04/22 98.8 °F (37.1 °C) (Oral)   22 98.1 °F (36.7 °C) (Oral)   22 97.7 °F (36.5 °C) (Oral)     BP Readings from Last 3 Encounters:   10/21/22 (!) 146/98   10/04/22 125/65   22 123/72     Pulse Readings from Last 3 Encounters:   10/04/22 66   22 70   22 68     There were no vitals filed for this visit.       Learning Assessment:  :     Learning Assessment 10/14/2020   PRIMARY LEARNER Patient   HIGHEST LEVEL OF EDUCATION - PRIMARY LEARNER  [employer]   BARRIERS PRIMARY LEARNER NONE   CO-LEARNER CAREGIVER No   PRIMARY LANGUAGE ENGLISH   LEARNER PREFERENCE PRIMARY OTHER (COMMENT)   ANSWERED BY patient    RELATIONSHIP SELF       Depression Screening:  :     3 most recent PHQ Screens 2022   Little interest or pleasure in doing things Several days   Feeling down, depressed, irritable, or hopeless Several days   Total Score PHQ 2 2   Trouble falling or staying asleep, or sleeping too much -   Feeling tired or having little energy -   Poor appetite, weight loss, or overeating -   Feeling bad about yourself - or that you are a failure or have let yourself or your family down -   Trouble concentrating on things such as school, work, reading, or watching TV -   Moving or speaking so slowly that other people could have noticed; or the opposite being so fidgety that others notice -   Thoughts of being better off dead, or hurting yourself in some way -   PHQ 9 Score -   How difficult have these problems made it for you to do your work, take care of your home and get along with others -       Fall Risk Assessment:  :     Fall Risk Assessment, last 12 mths 10/4/2022   Able to walk? Yes   Fall in past 12 months? 0   Do you feel unsteady? 0   Are you worried about falling 0       Abuse Screening:  :     Abuse Screening Questionnaire 4/27/2021 2/24/2021 4/30/2020 4/7/2020 10/3/2019 12/4/2018 8/8/2018   Do you ever feel afraid of your partner? N N N N N N N   Are you in a relationship with someone who physically or mentally threatens you? N N N N N N N   Is it safe for you to go home? Y Y Y Y Y Y Y       ADL Screening:  :     No flowsheet data found. Medication reconciliation up to date and corrected with patient at this time.

## 2022-12-09 DIAGNOSIS — G43.809 OTHER MIGRAINE WITHOUT STATUS MIGRAINOSUS, NOT INTRACTABLE: ICD-10-CM

## 2022-12-10 RX ORDER — BUTALBITAL, ACETAMINOPHEN AND CAFFEINE 300; 40; 50 MG/1; MG/1; MG/1
CAPSULE ORAL
Qty: 20 CAPSULE | Refills: 1 | Status: SHIPPED | OUTPATIENT
Start: 2022-12-10

## 2022-12-23 DIAGNOSIS — G43.809 OTHER MIGRAINE WITHOUT STATUS MIGRAINOSUS, NOT INTRACTABLE: ICD-10-CM

## 2022-12-23 DIAGNOSIS — E78.5 HYPERLIPIDEMIA, UNSPECIFIED HYPERLIPIDEMIA TYPE: ICD-10-CM

## 2022-12-23 RX ORDER — BUTALBITAL, ACETAMINOPHEN AND CAFFEINE 300; 40; 50 MG/1; MG/1; MG/1
CAPSULE ORAL
Qty: 20 CAPSULE | Refills: 1 | Status: SHIPPED | OUTPATIENT
Start: 2022-12-23

## 2022-12-23 RX ORDER — SIMVASTATIN 10 MG/1
TABLET, FILM COATED ORAL
Qty: 90 TABLET | Refills: 2 | Status: SHIPPED | OUTPATIENT
Start: 2022-12-23

## 2023-01-03 DIAGNOSIS — F41.9 ANXIETY: ICD-10-CM

## 2023-01-03 RX ORDER — ALPRAZOLAM 0.5 MG/1
TABLET ORAL
Qty: 30 TABLET | Refills: 0 | Status: SHIPPED | OUTPATIENT
Start: 2023-01-03

## 2023-01-12 ENCOUNTER — OFFICE VISIT (OUTPATIENT)
Dept: NEUROLOGY | Age: 68
End: 2023-01-12
Payer: MEDICARE

## 2023-01-12 VITALS — DIASTOLIC BLOOD PRESSURE: 86 MMHG | SYSTOLIC BLOOD PRESSURE: 126 MMHG | OXYGEN SATURATION: 96 % | HEART RATE: 83 BPM

## 2023-01-12 DIAGNOSIS — G43.809 OTHER MIGRAINE WITHOUT STATUS MIGRAINOSUS, NOT INTRACTABLE: ICD-10-CM

## 2023-01-12 DIAGNOSIS — G30.0 EARLY ONSET ALZHEIMER'S DEMENTIA WITHOUT BEHAVIORAL DISTURBANCE (HCC): Primary | ICD-10-CM

## 2023-01-12 DIAGNOSIS — F02.80 EARLY ONSET ALZHEIMER'S DEMENTIA WITHOUT BEHAVIORAL DISTURBANCE (HCC): Primary | ICD-10-CM

## 2023-01-12 RX ORDER — QUETIAPINE FUMARATE 50 MG/1
50 TABLET, FILM COATED ORAL
Qty: 30 TABLET | Refills: 5 | Status: SHIPPED | OUTPATIENT
Start: 2023-01-12

## 2023-01-12 RX ORDER — DICYCLOMINE HYDROCHLORIDE 10 MG/1
CAPSULE ORAL
COMMUNITY
Start: 2022-12-23

## 2023-01-12 RX ORDER — MEMANTINE HYDROCHLORIDE 10 MG/1
10 TABLET ORAL 2 TIMES DAILY
Qty: 60 TABLET | Refills: 5 | Status: SHIPPED | OUTPATIENT
Start: 2023-01-12

## 2023-01-12 NOTE — PROGRESS NOTES
Since on 10mg dose there has been no changes  Handling the medication very well  Has had an increase in anxiety, this affecting sleep  Would really like to discuss this   Xanax handles this well

## 2023-01-13 RX ORDER — BUTALBITAL, ACETAMINOPHEN AND CAFFEINE 300; 40; 50 MG/1; MG/1; MG/1
CAPSULE ORAL
Qty: 20 CAPSULE | Refills: 1 | Status: SHIPPED | OUTPATIENT
Start: 2023-01-13

## 2023-01-14 NOTE — PROGRESS NOTES
Niko Escobar is a 69 y.o. female who presents with the following  Chief Complaint   Patient presents with    Follow-up       HPI      Follow-up with daughter for testing  Memory continues to be stable  No big concerns from them  She was started on Aricept last visit  No issues with this at all  We did discuss last visit that [id number] and will add this this week    She does notice that her mother is not sleeping very well just because her mind will not shut down  She also is interested in trying something as long as it does not sedate her fully  She is eating well  She is sleeping well  No safety concerns  No hallucinations or delusions  No anxiety or depression  She is following with primary care and things are stable    Allergies   Allergen Reactions    Aspirin Not Reported This Time    Bupropion Nausea and Vomiting    Floxin [Ofloxacin] Rash    Ibuprofen Swelling    Maxalt [Rizatriptan] Palpitations    Prevacid [Lansoprazole] Rash    Wellbutrin [Bupropion Hcl] Rash       Current Outpatient Medications   Medication Sig    dicyclomine (BENTYL) 10 mg capsule TAKE 1 CAPSULE BY MOUTH THREE (3) TIMES DAILY AS NEEDED FOR ABDOMINAL CRAMPS.    QUEtiapine (SEROquel) 50 mg tablet Take 1 Tablet by mouth nightly. memantine (NAMENDA) 10 mg tablet Take 1 Tablet by mouth two (2) times a day. ALPRAZolam (XANAX) 0.5 mg tablet TAKE 1 TABLET BY MOUTH EVERY DAY AS NEEDED FOR ANXIETY    simvastatin (ZOCOR) 10 mg tablet TAKE 1 TABLET BY MOUTH EVERY DAY AT NIGHT    sertraline (ZOLOFT) 50 mg tablet Take 1 Tablet by mouth daily. donepeziL (ARICEPT) 10 mg tablet Take 1 Tablet by mouth nightly. vitamin A 2,400 mcg capsule Take 8,000 Units by mouth daily. simethicone (GAS-X) 125 mg capsule Take 125 mg by mouth four (4) times daily as needed for Flatulence. fexofenadine (ALLEGRA) 180 mg tablet Take  by mouth.    cranberry fruit concentrate (AZO CRANBERRY PO) Take  by mouth.     fluticasone propionate (FLONASE) 50 mcg/actuation nasal spray SPRAY 2 SPRAYS INTO EACH NOSTRIL EVERY DAY    ascorbic acid (VITAMIN C PO) Take  by mouth. zinc sulfate (ZINC-15 PO) Take  by mouth. MAGNESIUM PO Take  by mouth. albuterol (Ventolin HFA) 90 mcg/actuation inhaler Take 1 Puff by inhalation every four (4) hours as needed for Wheezing. acetaminophen (TYLENOL) 325 mg tablet Tylenol 325 mg tablet   PO in office    famotidine (PEPCID) 20 mg tablet Take 1 Tab by mouth nightly. cholecalciferol, VITAMIN D3, (VITAMIN D3) 5,000 unit tab tablet Take 1 Tab by mouth two (2) times a day. (Patient taking differently: Take 5,000 Units by mouth daily.)    butalbital-acetaminophen-caff (FIORICET) -40 mg per capsule TAKE 1 CAPSULE BY MOUTH EVERY 6 HOURS AS NEEDED FOR PAIN     No current facility-administered medications for this visit.        Social History     Tobacco Use   Smoking Status Never   Smokeless Tobacco Never       Past Medical History:   Diagnosis Date    Anxiety     Dementia ([facility] [address]     Gluten intolerance     Hypercholesterolemia     Sinus problem        Past Surgical History:   Procedure Laterality Date    HX COLONOSCOPY      HX GYN      endometrial ablation    HX HEENT      wisdom teeth extraction    HX ORTHOPAEDIC Left     left wrist ulnar nerve transposition    HX SHOULDER ARTHROSCOPY Bilateral     HX TONSILLECTOMY      GA REVAGINAL PROLAPSE,UTEROSACRAL         Family History   Problem Relation Age of Onset    Diabetes Mother     Hypertension Mother     Celiac Disease Father     Celiac Disease Brother     Diabetes Maternal Grandmother     No Known Problems Brother     No Known Problems Brother        Social History     Socioeconomic History    Marital status:    Tobacco Use    Smoking status: Never    Smokeless tobacco: Never   Vaping Use    Vaping Use: Never used   Substance and Sexual Activity    Alcohol use: Not Currently     Comment: wine    Drug use: No    Sexual activity: Never       Review of Systems   Eyes:  Negative for blurred vision, double vision and photophobia. Respiratory:  Negative for shortness of breath and wheezing. Neurological:  Negative for dizziness and headaches. Psychiatric/Behavioral:  Positive for memory loss. The patient has insomnia. Remainder of comprehensive review is negative. Physical Exam :    Visit Vitals  /86 (BP 1 Location: Left upper arm, BP Patient Position: Sitting, BP Cuff Size: Adult)   Pulse 83   SpO2 96%           Results for orders placed or performed in visit on 19/90/69   METABOLIC PANEL, COMPREHENSIVE   Result Value Ref Range    Glucose 105 (H) 65 - 99 mg/dL    BUN 8 8 - 27 mg/dL    Creatinine 0.80 0.57 - 1.00 mg/dL    eGFR 80 >59 mL/min/1.73    BUN/Creatinine ratio 10 (L) 12 - 28    Sodium 142 134 - 144 mmol/L    Potassium 4.4 3.5 - 5.2 mmol/L    Chloride 104 96 - 106 mmol/L    CO2 25 20 - 29 mmol/L    Calcium 9.3 8.7 - 10.3 mg/dL    Protein, total 6.9 6.0 - 8.5 g/dL    Albumin 4.6 3.8 - 4.8 g/dL    GLOBULIN, TOTAL 2.3 1.5 - 4.5 g/dL    A-G Ratio 2.0 1.2 - 2.2    Bilirubin, total 0.5 0.0 - 1.2 mg/dL    Alk. phosphatase 123 (H) 44 - 121 IU/L    AST (SGOT) 27 0 - 40 IU/L    ALT (SGPT) 20 0 - 32 IU/L   CBC WITH AUTOMATED DIFF   Result Value Ref Range    WBC 4.9 3.4 - 10.8 x10E3/uL    RBC 4.82 3.77 - 5.28 x10E6/uL    HGB 14.1 11.1 - 15.9 g/dL    HCT 42.7 34.0 - 46.6 %    MCV 89 79 - 97 fL    MCH 29.3 26.6 - 33.0 pg    MCHC 33.0 31.5 - 35.7 g/dL    RDW 13.3 11.7 - 15.4 %    PLATELET 804 540 - 065 x10E3/uL    NEUTROPHILS 67 Not Estab. %    Lymphocytes 20 Not Estab. %    MONOCYTES 11 Not Estab. %    EOSINOPHILS 1 Not Estab. %    BASOPHILS 1 Not Estab. %    ABS. NEUTROPHILS 3.3 1.4 - 7.0 x10E3/uL    Abs Lymphocytes 1.0 0.7 - 3.1 x10E3/uL    ABS. MONOCYTES 0.5 0.1 - 0.9 x10E3/uL    ABS. EOSINOPHILS 0.1 0.0 - 0.4 x10E3/uL    ABS. BASOPHILS 0.0 0.0 - 0.2 x10E3/uL    IMMATURE GRANULOCYTES 0 Not Estab. %    ABS. IMM.  GRANS. 0.0 0.0 - 0.1 x10E3/uL   AMB POC URINALYSIS DIP STICK AUTO W/ MICRO   Result Value Ref Range    Color (UA POC) Dark Yellow     Clarity (UA POC) Clear     Glucose (UA POC) Negative Negative    Bilirubin (UA POC) 1+ Negative    Ketones (UA POC) Negative Negative    Specific gravity (UA POC) 1.020 1.001 - 1.035    Blood (UA POC) Negative Negative    pH (UA POC) 7.0 4.6 - 8.0    Protein (UA POC) 1+ Negative    Urobilinogen (UA POC) 0.2 mg/dL 0.2 - 1    Nitrites (UA POC) Negative Negative    Leukocyte esterase (UA POC) Negative Negative       Orders Placed This Encounter    dicyclomine (BENTYL) 10 mg capsule     Sig: TAKE 1 CAPSULE BY MOUTH THREE (3) TIMES DAILY AS NEEDED FOR ABDOMINAL CRAMPS.    QUEtiapine (SEROquel) 50 mg tablet     Sig: Take 1 Tablet by mouth nightly. Dispense:  30 Tablet     Refill:  5    memantine (NAMENDA) 10 mg tablet     Sig: Take 1 Tablet by mouth two (2) times a day. Dispense:  60 Tablet     Refill:  5       1.  Early onset Alzheimer's dementia without behavioral disturbance ([facility] [address]      Early onset dementia  Testing we discussed   [facility] 10 mg twice daily will be added to treatment plan  We will try Seroquel 50 mg first and then as she feels good we will go to the [facility]  They will let us know how it works  She is also on Aricept and will continue this  She is being active and engaged and doing well daughter agrees                This note will not be viewable in [address]

## 2023-01-18 DIAGNOSIS — F41.9 ANXIETY: ICD-10-CM

## 2023-01-18 RX ORDER — SERTRALINE HYDROCHLORIDE 50 MG/1
TABLET, FILM COATED ORAL
Qty: 30 TABLET | Refills: 1 | Status: SHIPPED | OUTPATIENT
Start: 2023-01-18 | End: 2023-01-19 | Stop reason: SDUPTHER

## 2023-01-19 ENCOUNTER — VIRTUAL VISIT (OUTPATIENT)
Dept: INTERNAL MEDICINE CLINIC | Age: 68
End: 2023-01-19
Payer: MEDICARE

## 2023-01-19 DIAGNOSIS — R73.01 IFG (IMPAIRED FASTING GLUCOSE): ICD-10-CM

## 2023-01-19 DIAGNOSIS — E78.5 HYPERLIPIDEMIA, UNSPECIFIED HYPERLIPIDEMIA TYPE: ICD-10-CM

## 2023-01-19 DIAGNOSIS — G47.00 INSOMNIA, UNSPECIFIED TYPE: ICD-10-CM

## 2023-01-19 DIAGNOSIS — F03.90 DEMENTIA, UNSPECIFIED DEMENTIA SEVERITY, UNSPECIFIED DEMENTIA TYPE, UNSPECIFIED WHETHER BEHAVIORAL, PSYCHOTIC, OR MOOD DISTURBANCE OR ANXIETY (HCC): ICD-10-CM

## 2023-01-19 DIAGNOSIS — F41.9 ANXIETY: Primary | ICD-10-CM

## 2023-01-19 RX ORDER — SERTRALINE HYDROCHLORIDE 100 MG/1
100 TABLET, FILM COATED ORAL DAILY
Qty: 90 TABLET | Refills: 1 | Status: SHIPPED | OUTPATIENT
Start: 2023-01-19

## 2023-01-19 RX ORDER — QUETIAPINE FUMARATE 25 MG/1
25 TABLET, FILM COATED ORAL
Qty: 30 TABLET | Refills: 3 | Status: SHIPPED | OUTPATIENT
Start: 2023-01-19

## 2023-01-19 NOTE — PROGRESS NOTES
Isidra Calderón  Identified pt with two pt identifiers(name and ). Chief Complaint   Patient presents with    Medication Evaluation     VV// pt presenting today for medication evaluation. Per daughter, 50mg of Seroquel is to strong would like to decrease to 25mg        1. Have you been to the ER, urgent care clinic since your last visit? Hospitalized since your last visit? NO    2. Have you seen or consulted any other health care providers outside of the [address] [place] [address] since your last visit? Include any pap smears or colon screening. NO      Provider notified of reason for visit, vitals and flowsheets obtained on patients. Patient received paperwork for advance directive during previous visit but has not completed at this time     Reviewed record In preparation for visit, huddled with provider and have obtained necessary documentation      Health Maintenance Due   Topic    COVID-19 Vaccine (1)    DTaP/Tdap/Td series (2 - Td or Tdap)    Medicare Yearly Exam     Flu Vaccine (1)       Wt Readings from Last 3 Encounters:   11/10/22 173 lb (78.5 kg)   10/21/22 164 lb (74.4 kg)   10/04/22 167 lb 8 oz (76 kg)     Temp Readings from Last 3 Encounters:   10/04/22 98.8 °F (37.1 °C) (Oral)   22 98.1 °F (36.7 °C) (Oral)   22 97.7 °F (36.5 °C) (Oral)     BP Readings from Last 3 Encounters:   23 126/86   10/21/22 (!) 146/98   10/04/22 125/65     Pulse Readings from Last 3 Encounters:   23 83   10/04/22 66   22 70     There were no vitals filed for this visit.       Learning Assessment:  :     Learning Assessment 10/14/2020   PRIMARY LEARNER Patient   HIGHEST LEVEL OF EDUCATION - PRIMARY LEARNER  [employer]   BARRIERS PRIMARY LEARNER NONE   CO-LEARNER CAREGIVER No   PRIMARY LANGUAGE ENGLISH   LEARNER PREFERENCE PRIMARY OTHER (COMMENT)   ANSWERED BY patient    RELATIONSHIP SELF       Depression Screening:  :     3 most recent PHQ Screens 2022   Little interest or pleasure in doing things Several days   Feeling down, depressed, irritable, or hopeless Several days   Total Score PHQ 2 2   Trouble falling or staying asleep, or sleeping too much -   Feeling tired or having little energy -   Poor appetite, weight loss, or overeating -   Feeling bad about yourself - or that you are a failure or have let yourself or your family down -   Trouble concentrating on things such as school, work, reading, or watching TV -   Moving or speaking so slowly that other people could have noticed; or the opposite being so fidgety that others notice -   Thoughts of being better off dead, or hurting yourself in some way -   PHQ 9 Score -   How difficult have these problems made it for you to do your work, take care of your home and get along with others -       Fall Risk Assessment:  :     Fall Risk Assessment, last 12 mths 10/4/2022   Able to walk? Yes   Fall in past 12 months? 0   Do you feel unsteady? 0   Are you worried about falling 0       Abuse Screening:  :     Abuse Screening Questionnaire 4/27/2021 2/24/2021 4/30/2020 4/7/2020 10/3/2019 12/4/2018 8/8/2018   Do you ever feel afraid of your partner? N N N N N N N   Are you in a relationship with someone who physically or mentally threatens you? N N N N N N N   Is it safe for you to go home? Y Y Y Y Y Y Y       ADL Screening:  :     No flowsheet data found. Medication reconciliation up to date and corrected with patient at this time.

## 2023-01-19 NOTE — PROGRESS NOTES
Kiet Sims was seen on 1/19/2023 using synchronous (real-time) audio-video technology; doxy. me. Consent: Kiet Sims, who was seen by synchronous (real-time) audio-video technology, and/or her healthcare decision maker, is aware that this patient-initiated, Telehealth encounter on 1/19/2023 is a billable service, with coverage as determined by her insurance carrier. She is aware that she may receive a bill and has provided verbal consent to proceed: Yes. I was in the office while conducting this encounter. Kiet Sims is a 69 y.o. female who presents today for Medication Evaluation (VV// pt presenting today for medication evaluation. Per daughter, 50mg of Seroquel is to strong would like to decrease to 25mg )  . She has a history of   Patient Active Problem List   Diagnosis Code    Hyperlipidemia E78.5    Gastroesophageal reflux disease K21.9    Allergic rhinitis J30.9    Anxiety F41.9   . Today patient is being seen for follow up. she does not have other concerns. Dementia: recently diagnosed. Has not gotten in with neurology. Seroquel helping with sleep, but doing well with 25mg. 50 mg led to too much grogginess the next day. This is really helping with sleep. Has not yet started the [address] Short-term memory still struggle. Anxiety: still a bit elevated. Discussed increasing SSRI to 100mg. We discussed continuing to try to limit benzodiazepine. Remains on statin therapy. Due for repeat lipid testing. Patient is also due for her blood sugars to be tested. ROS  Review of Systems   Constitutional:  Negative for chills, fever and weight loss. HENT:  Negative for congestion and sore throat. Eyes:  Negative for blurred vision, double vision and photophobia. Respiratory:  Negative for cough and shortness of breath. Cardiovascular:  Negative for chest pain, palpitations and leg swelling.    Gastrointestinal:  Negative for abdominal pain, constipation, diarrhea, heartburn, nausea and vomiting. Genitourinary:  Negative for dysuria, frequency and urgency. Musculoskeletal:  Negative for joint pain and myalgias. Skin:  Negative for rash. Neurological: Negative. Negative for headaches. Endo/Heme/Allergies:  Does not bruise/bleed easily. Psychiatric/Behavioral:  Positive for memory loss. Negative for depression, hallucinations, substance abuse and suicidal ideas. The patient is nervous/anxious and has insomnia. There were no vitals taken for this visit. Patient-Reported Vitals 1/19/2023   Patient-Reported Weight -   Patient-Reported Height -   Patient-Reported Pulse 70   Patient-Reported Temperature 98.1   Patient-Reported SpO2 97   Patient-Reported Systolic  053   Patient-Reported Diastolic 87        Physical Exam  Constitutional:       Appearance: Normal appearance. HENT:      Head: Normocephalic and atraumatic. Right Ear: Tympanic membrane normal.      Left Ear: Tympanic membrane normal.   Pulmonary:      Effort: Pulmonary effort is normal.   Neurological:      General: No focal deficit present. Mental Status: She is alert. Psychiatric:         Mood and Affect: Mood normal.         Behavior: Behavior normal.         Current Outpatient Medications   Medication Sig    sertraline (ZOLOFT) 50 mg tablet TAKE 1 TABLET BY MOUTH EVERY DAY    butalbital-acetaminophen-caff (FIORICET) -40 mg per capsule TAKE 1 CAPSULE BY MOUTH EVERY 6 HOURS AS NEEDED FOR PAIN    dicyclomine (BENTYL) 10 mg capsule TAKE 1 CAPSULE BY MOUTH THREE (3) TIMES DAILY AS NEEDED FOR ABDOMINAL CRAMPS.    QUEtiapine (SEROquel) 50 mg tablet Take 1 Tablet by mouth nightly. ALPRAZolam (XANAX) 0.5 mg tablet TAKE 1 TABLET BY MOUTH EVERY DAY AS NEEDED FOR ANXIETY    simvastatin (ZOCOR) 10 mg tablet TAKE 1 TABLET BY MOUTH EVERY DAY AT NIGHT    donepeziL (ARICEPT) 10 mg tablet Take 1 Tablet by mouth nightly. vitamin A 2,400 mcg capsule Take 8,000 Units by mouth daily. simethicone (GAS-X) 125 mg capsule Take 125 mg by mouth four (4) times daily as needed for Flatulence. fexofenadine (ALLEGRA) 180 mg tablet Take  by mouth.    cranberry fruit concentrate (AZO CRANBERRY PO) Take  by mouth. fluticasone propionate (FLONASE) 50 mcg/actuation nasal spray SPRAY 2 SPRAYS INTO EACH NOSTRIL EVERY DAY    ascorbic acid (VITAMIN C PO) Take  by mouth. zinc sulfate (ZINC-15 PO) Take  by mouth. MAGNESIUM PO Take  by mouth. albuterol (Ventolin HFA) 90 mcg/actuation inhaler Take 1 Puff by inhalation every four (4) hours as needed for Wheezing. acetaminophen (TYLENOL) 325 mg tablet Tylenol 325 mg tablet   PO in office    famotidine (PEPCID) 20 mg tablet Take 1 Tab by mouth nightly. cholecalciferol, VITAMIN D3, (VITAMIN D3) 5,000 unit tab tablet Take 1 Tab by mouth two (2) times a day. (Patient taking differently: Take 5,000 Units by mouth daily.)    memantine (NAMENDA) 10 mg tablet Take 1 Tablet by mouth two (2) times a day. No current facility-administered medications for this visit.         Past Medical History:   Diagnosis Date    Anxiety     Dementia ([facility] [address]     Gluten intolerance     Hypercholesterolemia     Sinus problem       Past Surgical History:   Procedure Laterality Date    HX COLONOSCOPY      HX GYN      endometrial ablation    HX HEENT      wisdom teeth extraction    HX ORTHOPAEDIC Left     left wrist ulnar nerve transposition    HX SHOULDER ARTHROSCOPY Bilateral     HX TONSILLECTOMY      [place] REVAGINAL PROLAPSE,UTEROSACRAL        Social History     Tobacco Use    Smoking status: Never    Smokeless tobacco: Never   Substance Use Topics    Alcohol use: Not Currently     Comment: wine      Family History   Problem Relation Age of Onset    Diabetes Mother     Hypertension Mother     Celiac Disease Father     Celiac Disease Brother     Diabetes Maternal Grandmother     No Known Problems Brother     No Known Problems Brother         Allergies   Allergen Reactions Aspirin Not Reported This Time    Bupropion Nausea and Vomiting    Floxin [Ofloxacin] Rash    Ibuprofen Swelling    Maxalt [Rizatriptan] Palpitations    Prevacid [Lansoprazole] Rash    Wellbutrin [Bupropion Hcl] Rash        Assessment/Plan  Diagnoses and all orders for this visit:    1. Anxiety-initially seemed to improve her anxiety, but recently her anxiety is a bit worse. We discussed that sometimes the SSRIs are needed at higher doses for anxiety treatment. We will try her on 100 mg.  -     sertraline (ZOLOFT) 100 mg tablet; Take 1 Tablet by mouth daily. 2. Dementia, unspecified dementia severity, unspecified dementia type, unspecified whether behavioral, psychotic, or mood disturbance or anxiety ([facility])-seeing neurology. Side effects with 50 mg Seroquel, doing well with 25 mg. We will call this in for them    3. Insomnia, unspecified type  -     QUEtiapine (SEROquel) 25 mg tablet; Take 1 Tablet by mouth nightly. 4. Hyperlipidemia, unspecified hyperlipidemia type  -     METABOLIC PANEL, COMPREHENSIVE; Future  -     LIPID PANEL; Future    5. IFG (impaired fasting glucose)  -     HEMOGLOBIN A1C WITH EAG; Future             Berry Pipe, was evaluated through a synchronous (real-time) audio-video encounter. The patient (or guardian if applicable) is aware that this is a billable service, which includes applicable co-pays. This Virtual Visit was conducted with patient's (and/or legal guardian's) consent. The visit was conducted pursuant to the emergency declaration under the [address] [place] [address] authority and the [employer] General Act. Patient identification was verified, and a caregiver was present when appropriate. The patient was located in a state where the provider was licensed to provide care.      Manoj Gan MD  1/19/2023    This note was created with the help of speech recognition software (Dragon) and may contain some 'sound alike' errors.

## 2023-01-20 DIAGNOSIS — G43.809 OTHER MIGRAINE WITHOUT STATUS MIGRAINOSUS, NOT INTRACTABLE: ICD-10-CM

## 2023-01-20 RX ORDER — BUTALBITAL, ACETAMINOPHEN AND CAFFEINE 300; 40; 50 MG/1; MG/1; MG/1
CAPSULE ORAL
Qty: 20 CAPSULE | Refills: 1 | Status: SHIPPED | OUTPATIENT
Start: 2023-01-20

## 2023-01-24 ENCOUNTER — TELEPHONE (OUTPATIENT)
Dept: INTERNAL MEDICINE CLINIC | Age: 68
End: 2023-01-24

## 2023-01-24 RX ORDER — NIRMATRELVIR AND RITONAVIR 150-100 MG
2 KIT ORAL EVERY 12 HOURS
Qty: 1 BOX | Refills: 0 | Status: SHIPPED | OUTPATIENT
Start: 2023-01-24 | End: 2023-01-29

## 2023-01-24 NOTE — TELEPHONE ENCOUNTER
01/24/2023--This user called and spoke with the patient, she stated that she tested positive for Covid on Saturday along with the symptoms of coughing constantly, ears hurt really bad, congestion in her chest, temperature of 99, cannot eat, sleep or drink and I could hear audible wheezing while I was on phone with patient. Patient stated that she has been taking OTC Mucinex. Patient wanted to know if an antibiotic could be called in due to her symptoms. I let her know that I would check with the doctor and give her a call back. Patient verbalized understanding and had no further concerns at that time. 01/24/2023--This user called and spoke with the patient and let her know that we sent in the Paxlovid to her [facility] and not to take her Simvastatin while on the medication and let us know if symptoms worsen. Patient verbalized understanding and had no question's or concerns at that time.

## 2023-01-24 NOTE — TELEPHONE ENCOUNTER
Patient was calling to say she tested positive for COVID on Saturday - wants to talk to nurse because she feels really bad and cannot sleep  Please call back and advise when possible

## 2023-01-26 ENCOUNTER — TELEPHONE (OUTPATIENT)
Dept: INTERNAL MEDICINE CLINIC | Age: 68
End: 2023-01-26

## 2023-01-26 ENCOUNTER — APPOINTMENT (OUTPATIENT)
Dept: GENERAL RADIOLOGY | Age: 68
End: 2023-01-26
Attending: EMERGENCY MEDICINE
Payer: MEDICARE

## 2023-01-26 ENCOUNTER — HOSPITAL ENCOUNTER (EMERGENCY)
Age: 68
Discharge: HOME | End: 2023-01-26
Attending: EMERGENCY MEDICINE
Payer: MEDICARE

## 2023-01-26 VITALS
HEART RATE: 68 BPM | BODY MASS INDEX: 27.31 KG/M2 | SYSTOLIC BLOOD PRESSURE: 127 MMHG | WEIGHT: 160 LBS | DIASTOLIC BLOOD PRESSURE: 73 MMHG | TEMPERATURE: 98.2 F | OXYGEN SATURATION: 98 % | RESPIRATION RATE: 17 BRPM | HEIGHT: 64 IN

## 2023-01-26 DIAGNOSIS — R52 BODY ACHES: ICD-10-CM

## 2023-01-26 DIAGNOSIS — U07.1 COVID-19 VIRUS INFECTION: Primary | ICD-10-CM

## 2023-01-26 LAB
ANION GAP SERPL CALC-SCNC: 13 MMOL/L (ref 5–15)
BASOPHILS # BLD: 0 K/UL (ref 0–0.1)
BASOPHILS NFR BLD: 0 % (ref 0–1)
BUN SERPL-MCNC: 17 MG/DL (ref 6–20)
BUN/CREAT SERPL: 21 (ref 12–20)
CALCIUM SERPL-MCNC: 8.7 MG/DL (ref 8.5–10.1)
CHLORIDE SERPL-SCNC: 104 MMOL/L (ref 97–108)
CO2 SERPL-SCNC: 23 MMOL/L (ref 21–32)
COMMENT, HOLDF: NORMAL
CREAT SERPL-MCNC: 0.82 MG/DL (ref 0.55–1.02)
DIFFERENTIAL METHOD BLD: ABNORMAL
EOSINOPHIL # BLD: 0 K/UL (ref 0–0.4)
EOSINOPHIL NFR BLD: 0 % (ref 0–7)
ERYTHROCYTE [DISTWIDTH] IN BLOOD BY AUTOMATED COUNT: 14.6 % (ref 11.5–14.5)
GLUCOSE SERPL-MCNC: 82 MG/DL (ref 65–100)
HCT VFR BLD AUTO: 40.7 % (ref 35–47)
HGB BLD-MCNC: 13.3 G/DL (ref 11.5–16)
IMM GRANULOCYTES # BLD AUTO: 0 K/UL (ref 0–0.04)
IMM GRANULOCYTES NFR BLD AUTO: 0 % (ref 0–0.5)
LYMPHOCYTES # BLD: 1 K/UL (ref 0.8–3.5)
LYMPHOCYTES NFR BLD: 24 % (ref 12–49)
MCH RBC QN AUTO: 28.5 PG (ref 26–34)
MCHC RBC AUTO-ENTMCNC: 32.7 G/DL (ref 30–36.5)
MCV RBC AUTO: 87.3 FL (ref 80–99)
MONOCYTES # BLD: 0.4 K/UL (ref 0–1)
MONOCYTES NFR BLD: 9 % (ref 5–13)
NEUTS SEG # BLD: 2.7 K/UL (ref 1.8–8)
NEUTS SEG NFR BLD: 67 % (ref 32–75)
NRBC # BLD: 0 K/UL (ref 0–0.01)
NRBC BLD-RTO: 0 PER 100 WBC
PLATELET # BLD AUTO: 245 K/UL (ref 150–400)
PMV BLD AUTO: 10.1 FL (ref 8.9–12.9)
POTASSIUM SERPL-SCNC: 3.5 MMOL/L (ref 3.5–5.1)
RBC # BLD AUTO: 4.66 M/UL (ref 3.8–5.2)
SAMPLES BEING HELD,HOLD: NORMAL
SODIUM SERPL-SCNC: 140 MMOL/L (ref 136–145)
TROPONIN-HIGH SENSITIVITY: 20 NG/L (ref 0–51)
WBC # BLD AUTO: 4.2 K/UL (ref 3.6–11)

## 2023-01-26 PROCEDURE — 99284 EMERGENCY DEPT VISIT MOD MDM: CPT

## 2023-01-26 PROCEDURE — 93005 ELECTROCARDIOGRAM TRACING: CPT

## 2023-01-26 PROCEDURE — 36415 COLL VENOUS BLD VENIPUNCTURE: CPT

## 2023-01-26 PROCEDURE — 85025 COMPLETE CBC W/AUTO DIFF WBC: CPT

## 2023-01-26 PROCEDURE — 71046 X-RAY EXAM CHEST 2 VIEWS: CPT

## 2023-01-26 PROCEDURE — 80048 BASIC METABOLIC PNL TOTAL CA: CPT

## 2023-01-26 PROCEDURE — 74011250637 HC RX REV CODE- 250/637: Performed by: EMERGENCY MEDICINE

## 2023-01-26 PROCEDURE — 74011250636 HC RX REV CODE- 250/636: Performed by: EMERGENCY MEDICINE

## 2023-01-26 PROCEDURE — 84484 ASSAY OF TROPONIN QUANT: CPT

## 2023-01-26 RX ORDER — ACETAMINOPHEN 500 MG
1000 TABLET ORAL ONCE
Status: COMPLETED | OUTPATIENT
Start: 2023-01-26 | End: 2023-01-26

## 2023-01-26 RX ADMIN — SODIUM CHLORIDE 1000 ML: 9 INJECTION, SOLUTION INTRAVENOUS at 13:17

## 2023-01-26 RX ADMIN — ACETAMINOPHEN 1000 MG: 500 TABLET ORAL at 13:17

## 2023-01-26 NOTE — ED PROVIDER NOTES
67F w/ hx HTN, dementia, HLD p/w 4d body aches. Pt was dx 5d ago w/ COVID19 and she is unvaccinated. Was started on paxlovid and is on day 4/5. She reports persistent body aches, fatigue, decreased appetite, cough, diarrhea and HA. Last fever was a few days ago. No N/V, dyspnea, dizziness, syncope or chest pain. Tolerating PO. Lives at home w/ daughters.        Past Medical History:   Diagnosis Date    Anxiety     Dementia ([address]     Gluten intolerance     Hypercholesterolemia     Sinus problem        Past Surgical History:   Procedure Laterality Date    HX COLONOSCOPY      HX GYN      endometrial ablation    HX HEENT      wisdom teeth extraction    HX ORTHOPAEDIC Left     left wrist ulnar nerve transposition    HX SHOULDER ARTHROSCOPY Bilateral     HX TONSILLECTOMY      IA REVAGINAL PROLAPSE,UTEROSACRAL           Family History:   Problem Relation Age of Onset    Diabetes Mother     Hypertension Mother     Celiac Disease Father     Celiac Disease Brother     Diabetes Maternal Grandmother     No Known Problems Brother     No Known Problems Brother        Social History     Socioeconomic History    Marital status:      Spouse name: Not on file    Number of children: Not on file    Years of education: Not on file    Highest education level: Not on file   Occupational History    Not on file   Tobacco Use    Smoking status: Never    Smokeless tobacco: Never   Vaping Use    Vaping Use: Never used   Substance and Sexual Activity    Alcohol use: Not Currently     Comment: wine    Drug use: No    Sexual activity: Never   Other Topics Concern    Not on file   Social History Narrative    Not on file     Social Determinants of Health     Financial Resource Strain: Not on file   Food Insecurity: Not on file   Transportation Needs: Not on file   Physical Activity: Not on file   Stress: Not on file   Social Connections: Not on file   Intimate Partner Violence: Not on file   Housing Stability: Not on file         ALLERGIES: Aspirin, Bupropion, Floxin [ofloxacin], Ibuprofen, Maxalt [rizatriptan], Prevacid [lansoprazole], and Wellbutrin [bupropion hcl]    Review of Systems   Constitutional:  Positive for fatigue. Negative for chills, diaphoresis and fever. HENT:  Negative for facial swelling, mouth sores, nosebleeds, trouble swallowing and voice change. Eyes:  Negative for pain and visual disturbance. Respiratory:  Positive for cough. Negative for apnea, choking, shortness of breath, wheezing and stridor. Cardiovascular:  Negative for chest pain, palpitations and leg swelling. Gastrointestinal:  Positive for diarrhea. Negative for abdominal distention, abdominal pain, blood in stool, nausea and vomiting. Genitourinary:  Negative for difficulty urinating, dysuria, flank pain, hematuria and pelvic pain. Musculoskeletal:  Negative for joint swelling. Skin:  Negative for color change and rash. Allergic/Immunologic: Negative for immunocompromised state. Neurological:  Negative for dizziness, seizures, syncope, speech difficulty and light-headedness. Hematological:  Does not bruise/bleed easily. Psychiatric/Behavioral:  Negative for agitation and behavioral problems. Vitals:    01/26/23 1222   BP: 127/73   Pulse: 68   Resp: 17   Temp: 98.2 °F (36.8 °C)   SpO2: 98%   Weight: 72.6 kg (160 lb)   Height: 5' 4\" (1.626 m)            Physical Exam  Vitals and nursing note reviewed. Constitutional:       General: She is not in acute distress. Appearance: Normal appearance. She is not ill-appearing or toxic-appearing. HENT:      Head: Normocephalic and atraumatic. Right Ear: External ear normal.      Left Ear: External ear normal.      Nose: Nose normal.      Mouth/Throat:      Mouth: Mucous membranes are moist.      Pharynx: Oropharynx is clear. No oropharyngeal exudate or posterior oropharyngeal erythema. Eyes:      General: No scleral icterus. Extraocular Movements: Extraocular movements intact. Conjunctiva/sclera: Conjunctivae normal.      Pupils: Pupils are equal, round, and reactive to light. Cardiovascular:      Rate and Rhythm: Normal rate and regular rhythm. Pulses: Normal pulses. Heart sounds: Normal heart sounds. No murmur heard. No friction rub. No gallop. Pulmonary:      Effort: Pulmonary effort is normal. No tachypnea, accessory muscle usage or respiratory distress. Breath sounds: Normal breath sounds. No stridor. No decreased breath sounds, wheezing, rhonchi or rales. Abdominal:      General: There is no distension. Palpations: Abdomen is soft. Tenderness: There is no abdominal tenderness. There is no guarding or rebound. Musculoskeletal:         General: No tenderness or deformity. Normal range of motion. Cervical back: Normal range of motion and neck supple. No rigidity. Right lower leg: No edema. Left lower leg: No edema. Skin:     General: Skin is warm. Capillary Refill: Capillary refill takes less than 2 seconds. Coloration: Skin is not jaundiced. Neurological:      General: No focal deficit present. Mental Status: She is alert. Cranial Nerves: No cranial nerve deficit. Sensory: No sensory deficit. Motor: No weakness. Coordination: Coordination normal.   Psychiatric:         Mood and Affect: Mood normal.         Behavior: Behavior normal.         Thought Content: Thought content normal.         Judgment: Judgment normal.      I personally reviewed and independently interpreted EKG, labs and imaging results.     EKG Interpretation   SR, narrow QRS, nl intervals, no DANITA/STD/TWI    LABORATORY TESTS:  Admission on 01/26/2023, Discharged on 01/26/2023   Component Date Value Ref Range Status    Ventricular Rate 01/26/2023 66  BPM Preliminary    Atrial Rate 01/26/2023 66  BPM Preliminary    P-R Interval 01/26/2023 136  ms Preliminary    QRS Duration 01/26/2023 90  ms Preliminary    Q-T Interval 01/26/2023 482 ms Preliminary    QTC Calculation (Bezet) 01/26/2023 505  ms Preliminary    Calculated P Axis 01/26/2023 52  degrees Preliminary    Calculated R Axis 01/26/2023 -2  degrees Preliminary    Calculated T Axis 01/26/2023 21  degrees Preliminary    Diagnosis 01/26/2023    Preliminary                    Value:Normal sinus rhythm  Nonspecific ST abnormality  Prolonged QT  Abnormal ECG  When compared with ECG of 09-FEB-2009 16:29,  Previous ECG has undetermined rhythm, needs review  QT has lengthened      SAMPLES BEING HELD 01/26/2023 1BL,1RED,1SST   Final    COMMENT 01/26/2023 Add-on orders for these samples will be processed based on acceptable specimen integrity and analyte stability, which may vary by analyte. Final    WBC 01/26/2023 4.2  3.6 - 11.0 K/uL Final    RBC 01/26/2023 4.66  3.80 - 5.20 M/uL Final    HGB 01/26/2023 13.3  11.5 - 16.0 g/dL Final    HCT 01/26/2023 40.7  35.0 - 47.0 % Final    MCV 01/26/2023 87.3  80.0 - 99.0 FL Final    MCH 01/26/2023 28.5  26.0 - 34.0 PG Final    MCHC 01/26/2023 32.7  30.0 - 36.5 g/dL Final    RDW 01/26/2023 14.6 (A)  11.5 - 14.5 % Final    PLATELET 82/92/9440 042  150 - 400 K/uL Final    MPV 01/26/2023 10.1  8.9 - 12.9 FL Final    NRBC 01/26/2023 0.0  0  WBC Final    ABSOLUTE NRBC 01/26/2023 0.00  0.00 - 0.01 K/uL Final    NEUTROPHILS 01/26/2023 67  32 - 75 % Final    LYMPHOCYTES 01/26/2023 24  12 - 49 % Final    MONOCYTES 01/26/2023 9  5 - 13 % Final    EOSINOPHILS 01/26/2023 0  0 - 7 % Final    BASOPHILS 01/26/2023 0  0 - 1 % Final    IMMATURE GRANULOCYTES 01/26/2023 0  0.0 - 0.5 % Final    ABS. NEUTROPHILS 01/26/2023 2.7  1.8 - 8.0 K/UL Final    ABS. LYMPHOCYTES 01/26/2023 1.0  0.8 - 3.5 K/UL Final    ABS. MONOCYTES 01/26/2023 0.4  0.0 - 1.0 K/UL Final    ABS. EOSINOPHILS 01/26/2023 0.0  0.0 - 0.4 K/UL Final    ABS. BASOPHILS 01/26/2023 0.0  0.0 - 0.1 K/UL Final    ABS. IMM.  GRANS. 01/26/2023 0.0  0.00 - 0.04 K/UL Final    DF 01/26/2023 AUTOMATED    Final    Sodium 01/26/2023 140  136 - 145 mmol/L Final    Potassium 01/26/2023 3.5  3.5 - 5.1 mmol/L Final    Chloride 01/26/2023 104  97 - 108 mmol/L Final    CO2 01/26/2023 23  21 - 32 mmol/L Final    Anion gap 01/26/2023 13  5 - 15 mmol/L Final    Glucose 01/26/2023 82  65 - 100 mg/dL Final    BUN 01/26/2023 17  6 - 20 MG/DL Final    Creatinine 01/26/2023 0.82  0.55 - 1.02 MG/DL Final    BUN/Creatinine ratio 01/26/2023 21 (A)  12 - 20   Final    eGFR 01/26/2023 >60  >60 ml/min/1.73m2 Final    Comment:      Pediatric calculator link: [employer]. org/professionals/kdoqi/gfr_calculatorped       These results are not intended for use in patients <25years of age. eGFR results are calculated without a race factor using  the 2021 CKD-EPI equation. Careful clinical correlation is recommended, particularly when comparing to results calculated using previous equations. The CKD-EPI equation is less accurate in patients with extremes of muscle mass, extra-renal metabolism of creatinine, excessive creatine ingestion, or following therapy that affects renal tubular secretion. Calcium 01/26/2023 8.7  8.5 - 10.1 MG/DL Final    Troponin-High Sensitivity 01/26/2023 20  0 - 51 ng/L Final    Comment: A HS troponin value change of (+ or -) 50% or more below the 99th percentile, in a 1/2/3 hr interval represents a significant change. Clinical correlation is recommended. A HS troponin value change of (+ or -) 20% or above the 99th percentile, in a 1/2/3 hr interval represents a significant change. Clinical correlation is recommended. 99th Percentile:   Women: 0-51 ng/L                                                                Men:   0-76 ng/L  Patients taking more than 20 mg/day of biotin may have falsely negative results and should not use this test.         IMAGING RESULTS:  XR CHEST PA LAT   Final Result   Clear lungs.           MEDICATIONS GIVEN:  Medications   sodium chloride 0.9 % bolus infusion 1,000 mL (0 mL IntraVENous IV Completed 1/26/23 1446)   acetaminophen (TYLENOL) tablet 1,000 mg (1,000 mg Oral Given 1/26/23 1317)       IMPRESSION:  1. COVID-19 virus infection    2. Body aches        PLAN:  - Discharge    Roel Chiu MD      Medical Decision Making  36I w/ hx HTN, dementia, HLD p/w 4d body aches. Pt was dx 5d ago w/ COVID19 and she is unvaccinated. Was started on paxlovid and is on day 4/5. Today, pt is well appearing, afebrile, hemodynamically stable w/o hypoxia, resp distress or increased WOB. Her CXR is clear w/o infiltrate or edema. EKG SR w/o ischemic changes. Labs unremarkable including neg trop. Given IVF and tylenol for symptoms w/ improvement. This patient was seen during the [place] 19 Pandemic. I suspect that the patient has COVID 19 infection of mild severity based on HPI and physical exam. They are in no need of supplemental oxygenation and do not require further inpatient evaluation/admission at this time. Patient is aware of return precautions which include severe shortness of breath, chest pain, high fevers or any worsening of symptoms, detailed instructions regarding s/s of COVID 19 instructions included in the dc papers as well. Patient is aware that they must follow-up with the primary care physician ideally via a virtual visit or return to the emergency department only if his symptoms worsen. Isolation precautions and hand hygeine discussed at length. At the time of d/c patient is well appearing, not sickly, toxic, ambulatory without respiratory distress, hypotension or hypoxia. Amount and/or Complexity of Data Reviewed  Independent Historian: caregiver  Labs: ordered. Decision-making details documented in ED Course. Radiology: ordered and independent interpretation performed. Decision-making details documented in ED Course. ECG/medicine tests: ordered and independent interpretation performed. Decision-making details documented in ED Course. Risk  OTC drugs.   Decision regarding hospitalization.            Procedures

## 2023-01-26 NOTE — TELEPHONE ENCOUNTER
01/26/2023--This user received a phone call from patient's daughter via the [address] [place] [address],[address] stating that patient is on the 4 th day of Covid and has been taking the Paxlovid as directed and feels like that has helped with the pain, but this morning patient was in tears and her oxygen sat was at 91% RA, complaining of chest pains, SOB and still has the audible wheezing in chest. She denies any fevers. I recommended that patient go to the ER due to her O2 sat and her having the chest pains/SOB and still having the wheezing in her chest, that way they can do a chest xray and other imaging if needed labs ect. The daughter verbalized understanding and stated that she would take her to [facility] and had no further question's or concerns at that time. I let her know that she can call us and give us an update if she would like.

## 2023-01-26 NOTE — PROGRESS NOTES
Spiritual Care Assessment/Progress Note  [address]      NAME: Apolinar Granda      MRN: [id number]  AGE: 69 y.o. SEX: female  Gnosticism Affiliation: No Scientologist   Language: English     1/26/2023     Total Time (in minutes): 16     Spiritual Assessment begun in [facility] EMERGENCY DEPT through conversation with:         []Patient        [] Family    [] Friend(s)        Reason for Consult: Emergency Department visit, Initial/Spiritual assessment, patient floor, Spiritual care volunteer     Spiritual beliefs: (Please include comment if needed)     [] Identifies with a evelyne tradition:         [] Supported by a evelyne community:            [] Claims no spiritual orientation:           [] Seeking spiritual identity:                [x] Adheres to an individual form of spirituality:           [] Not able to assess:                           Identified resources for coping:      [] Prayer                               [] Music                  [] Guided Imagery     [x] Family/friends                 [] Pet visits     [] Devotional reading                         [] Unknown     [] Other:                                               Interventions offered during this visit: (See comments for more details)    Patient Interventions: Affirmation of emotions/emotional suffering, Coping skills reviewed/reinforced, Initial/Spiritual assessment, patient floor, Normalization of emotional/spiritual concerns     Family/Friend(s):  Affirmation of emotions/emotional suffering, Normalization of emotional/spiritual concerns, Coping skills reviewed/reinforced     Plan of Care:     [x] Support spiritual and/or cultural needs    [] Support AMD and/or advance care planning process      [x] Support grieving process   [] Coordinate Rites and/or Rituals    [] Coordination with community clergy   [] No spiritual needs identified at this time   [] Detailed Plan of Care below (See Comments)  [] Make referral to Music Therapy  [] Make referral to Pet Therapy     [] Make referral to Addiction services  [] Make referral to [facility]  [] Make referral to Spiritual Care Partner  [] No future visits requested        [] Contact Spiritual Care for further referrals     Comments: Rounding in Emergency Room: Katya Bardales and her daughter were waiting to be served in er AREA. No spiritual needs noted. Provided words of comfort and support, ministry of presence, empathic listening, hospitality and Intro to spiritual care. [address] [facility]   paging Service [phone] ([phone])

## 2023-01-26 NOTE — ED TRIAGE NOTES
Patient dx with COVID 4 days ago and now having worsening shortness of breath, generalized weakness, and headache

## 2023-01-27 LAB
ATRIAL RATE: 66 BPM
CALCULATED P AXIS, ECG09: 52 DEGREES
CALCULATED R AXIS, ECG10: -2 DEGREES
CALCULATED T AXIS, ECG11: 21 DEGREES
DIAGNOSIS, 93000: NORMAL
P-R INTERVAL, ECG05: 136 MS
Q-T INTERVAL, ECG07: 482 MS
QRS DURATION, ECG06: 90 MS
QTC CALCULATION (BEZET), ECG08: 505 MS
VENTRICULAR RATE, ECG03: 66 BPM

## 2023-01-30 DIAGNOSIS — F41.9 ANXIETY: ICD-10-CM

## 2023-01-30 RX ORDER — ALPRAZOLAM 0.5 MG/1
TABLET ORAL
Qty: 30 TABLET | Refills: 0 | Status: SHIPPED | OUTPATIENT
Start: 2023-01-30

## 2023-02-05 DIAGNOSIS — G43.809 OTHER MIGRAINE WITHOUT STATUS MIGRAINOSUS, NOT INTRACTABLE: ICD-10-CM

## 2023-02-05 RX ORDER — BUTALBITAL, ACETAMINOPHEN AND CAFFEINE 300; 40; 50 MG/1; MG/1; MG/1
CAPSULE ORAL
Qty: 20 CAPSULE | Refills: 1 | Status: SHIPPED | OUTPATIENT
Start: 2023-02-05

## 2023-02-07 ENCOUNTER — TELEPHONE (OUTPATIENT)
Dept: INTERNAL MEDICINE CLINIC | Age: 68
End: 2023-02-07

## 2023-02-07 NOTE — TELEPHONE ENCOUNTER
Patients daughter calls  noting mom had covid diagnosed 1/21-got dehydrated and needed ivf-now no fever or sob but feels jittery and has tremors in her hands and feet.   No focal pain and daughter notes she is trying to encourage mom to drink  more fluids  Advised no  need for evaluation at night ( called at 9:30 pm) but would benefit  from in office evaluation in the next week or so   Daughter will call office in am

## 2023-02-09 ENCOUNTER — TELEPHONE (OUTPATIENT)
Dept: INTERNAL MEDICINE CLINIC | Age: 68
End: 2023-02-09

## 2023-02-09 NOTE — TELEPHONE ENCOUNTER
02/09/2023--This user received a phone call via the [address] [place] [address],[address] from the daughter stating that patient is about 15-16 days out from Covid now and is better in that aspect but having trouble with extreme fatigue, tremors in her feet and headaches (Not constant). Yael Mason stated that her mother was very anxious about these issues and she was concerned as well, especially with her mother having dementia now and just wanted her to come in and get checked out and was also told if she was not getting any better to do so (She called not long ago). There was an opening for tomorrow morning 02/10, the daughter agreed to that appointment and was very appreciative of that.

## 2023-02-09 NOTE — TELEPHONE ENCOUNTER
Patient's daughter called, unable to take appts for next week with Dr. Byron Parikh due to conflicts . Will call to check schedule again.      Spoke with on call doctor on 02/07 regarding  covid side effects

## 2023-02-10 ENCOUNTER — OFFICE VISIT (OUTPATIENT)
Dept: INTERNAL MEDICINE CLINIC | Age: 68
End: 2023-02-10
Payer: MEDICARE

## 2023-02-10 VITALS
HEART RATE: 64 BPM | OXYGEN SATURATION: 97 % | BODY MASS INDEX: 26.43 KG/M2 | DIASTOLIC BLOOD PRESSURE: 67 MMHG | HEIGHT: 64 IN | SYSTOLIC BLOOD PRESSURE: 114 MMHG | WEIGHT: 154.8 LBS | TEMPERATURE: 98.2 F | RESPIRATION RATE: 14 BRPM

## 2023-02-10 DIAGNOSIS — R53.83 FATIGUE, UNSPECIFIED TYPE: Primary | ICD-10-CM

## 2023-02-10 DIAGNOSIS — Z87.440 HISTORY OF RECURRENT UTIS: ICD-10-CM

## 2023-02-10 DIAGNOSIS — U07.1 COVID-19: ICD-10-CM

## 2023-02-10 DIAGNOSIS — F03.90 DEMENTIA, UNSPECIFIED DEMENTIA SEVERITY, UNSPECIFIED DEMENTIA TYPE, UNSPECIFIED WHETHER BEHAVIORAL, PSYCHOTIC, OR MOOD DISTURBANCE OR ANXIETY (HCC): ICD-10-CM

## 2023-02-10 DIAGNOSIS — E78.5 HYPERLIPIDEMIA, UNSPECIFIED HYPERLIPIDEMIA TYPE: ICD-10-CM

## 2023-02-10 DIAGNOSIS — R73.01 IFG (IMPAIRED FASTING GLUCOSE): ICD-10-CM

## 2023-02-10 DIAGNOSIS — F41.9 ANXIETY: ICD-10-CM

## 2023-02-10 DIAGNOSIS — R25.1 TREMOR: ICD-10-CM

## 2023-02-10 NOTE — PROGRESS NOTES
Clarisa Mendoza is a 69 y.o. female who presents today for Post-COVID Symptoms (RM18// pt presenting today with post covid symptoms shaky legs and feet and fatigue)  . She has a history of   Patient Active Problem List   Diagnosis Code    Hyperlipidemia E78.5    Gastroesophageal reflux disease K21.9    Allergic rhinitis J30.9    Anxiety F41.9   . Today patient is here for an acute visit. COVID: Patient did just recently have COVID. She was seen in the ER on the 26. We had placed her on Paxlovid on the 24th. Blood work in the ER did not show any significant abnormalities. Since she reports that her breathing has come back to normal.  She seems to have developed shaking episodes in her legs. Reports that this seems to come on at rest.  No pain. Appetite and PO intake down, but has began improving about 3 days ago. Dementia: At last visit she had not yet started Namenda. Reports that she is now not taking Namenda. .  Still taking Aricept. We had increased her sertraline at last visit to 100 mg to help with her anxiety. She is doing better with the Seroquel at 25 mg, 50 was too strong. Trying to limit benzo use. HLD: due for repeat. IFG: repeat     ROS  Review of Systems   Constitutional:  Positive for malaise/fatigue and weight loss. Negative for chills and fever. HENT:  Negative for congestion (better) and sore throat. Eyes:  Negative for blurred vision, double vision and photophobia. Respiratory:  Negative for cough (resolved) and shortness of breath. Cardiovascular:  Negative for chest pain, palpitations and leg swelling. Gastrointestinal:  Negative for abdominal pain, constipation, diarrhea, heartburn, nausea and vomiting. Poor appetite   Genitourinary:  Negative for dysuria, frequency and urgency. Musculoskeletal:  Negative for joint pain and myalgias. Skin:  Negative for rash. Neurological: Negative. Negative for headaches.    Endo/Heme/Allergies:  Does not bruise/bleed easily. Psychiatric/Behavioral:  Positive for memory loss. Negative for suicidal ideas. The patient is nervous/anxious. Visit Vitals  /67 (BP 1 Location: Left upper arm, BP Patient Position: Sitting, BP Cuff Size: Large adult)   Pulse 64   Temp 98.2 °F (36.8 °C) (Oral)   Resp 14   Ht 5' 4\" (1.626 m)   Wt 154 lb 12.8 oz (70.2 kg)   SpO2 97%   BMI 26.57 kg/m²       Physical Exam  Constitutional:       General: She is not in acute distress. Appearance: Normal appearance. She is well-developed. Comments: Has lost weight   HENT:      Head: Normocephalic and atraumatic. Nose: Nose normal.      Mouth/Throat:      Mouth: Mucous membranes are moist.   Eyes:      Pupils: Pupils are equal, round, and reactive to light. Neck:      Thyroid: No thyromegaly. Cardiovascular:      Rate and Rhythm: Normal rate and regular rhythm. Heart sounds: No murmur heard. Pulmonary:      Effort: Pulmonary effort is normal.      Breath sounds: Normal breath sounds. No stridor. No wheezing or rhonchi. Abdominal:      General: Bowel sounds are normal. There is no distension. Palpations: Abdomen is soft. Musculoskeletal:      Cervical back: Normal range of motion and neck supple. Comments: Resting LE and foot tremor vs shaking. Regular and rapid. Easily stopped by concentrating on this of pressure on legs. No other tremor noted. Skin:     General: Skin is warm and dry. Neurological:      Mental Status: She is alert and oriented to person, place, and time. Cranial Nerves: No cranial nerve deficit.    Psychiatric:         Behavior: Behavior normal.         Current Outpatient Medications   Medication Sig    butalbital-acetaminophen-caff (FIORICET) -40 mg per capsule TAKE 1 CAPSULE BY MOUTH EVERY 6 HOURS AS NEEDED FOR PAIN    ALPRAZolam (XANAX) 0.5 mg tablet TAKE 1 TABLET BY MOUTH EVERY DAY AS NEEDED FOR ANXIETY    QUEtiapine (SEROquel) 25 mg tablet Take 1 Tablet by mouth nightly. sertraline (ZOLOFT) 100 mg tablet Take 1 Tablet by mouth daily. dicyclomine (BENTYL) 10 mg capsule TAKE 1 CAPSULE BY MOUTH THREE (3) TIMES DAILY AS NEEDED FOR ABDOMINAL CRAMPS. memantine (NAMENDA) 10 mg tablet Take 1 Tablet by mouth two (2) times a day. simvastatin (ZOCOR) 10 mg tablet TAKE 1 TABLET BY MOUTH EVERY DAY AT NIGHT    donepeziL (ARICEPT) 10 mg tablet Take 1 Tablet by mouth nightly. vitamin A 2,400 mcg capsule Take 8,000 Units by mouth daily. simethicone (GAS-X) 125 mg capsule Take 125 mg by mouth four (4) times daily as needed for Flatulence. fexofenadine (ALLEGRA) 180 mg tablet Take  by mouth.    cranberry fruit concentrate (AZO CRANBERRY PO) Take  by mouth. fluticasone propionate (FLONASE) 50 mcg/actuation nasal spray SPRAY 2 SPRAYS INTO EACH NOSTRIL EVERY DAY    ascorbic acid (VITAMIN C PO) Take  by mouth. zinc sulfate (ZINC-15 PO) Take  by mouth. MAGNESIUM PO Take  by mouth. albuterol (Ventolin HFA) 90 mcg/actuation inhaler Take 1 Puff by inhalation every four (4) hours as needed for Wheezing. acetaminophen (TYLENOL) 325 mg tablet Tylenol 325 mg tablet   PO in office    famotidine (PEPCID) 20 mg tablet Take 1 Tab by mouth nightly. cholecalciferol, VITAMIN D3, (VITAMIN D3) 5,000 unit tab tablet Take 1 Tab by mouth two (2) times a day. (Patient taking differently: Take 5,000 Units by mouth daily.)     No current facility-administered medications for this visit.         Past Medical History:   Diagnosis Date    Anxiety     Dementia ([address]     Gluten intolerance     Hypercholesterolemia     Sinus problem       Past Surgical History:   Procedure Laterality Date    HX COLONOSCOPY      HX GYN      endometrial ablation    HX HEENT      wisdom teeth extraction    HX ORTHOPAEDIC Left     left wrist ulnar nerve transposition    HX SHOULDER ARTHROSCOPY Bilateral     HX TONSILLECTOMY      [facility] COLPOPEXY VAGINAL INTRAPERITONEAL APPROACH        Social History     Tobacco Use    Smoking status: Never    Smokeless tobacco: Never   Substance Use Topics    Alcohol use: Not Currently     Comment: wine      Family History   Problem Relation Age of Onset    Diabetes Mother     Hypertension Mother     Celiac Disease Father     Celiac Disease Brother     Diabetes Maternal Grandmother     No Known Problems Brother     No Known Problems Brother         Allergies   Allergen Reactions    Aspirin Not Reported This Time    Bupropion Nausea and Vomiting    Floxin [Ofloxacin] Rash    Ibuprofen Swelling    Maxalt [Rizatriptan] Palpitations    Prevacid [Lansoprazole] Rash    Wellbutrin [Bupropion Hcl] Rash        Assessment/Plan  Diagnoses and all orders for this visit:    1. Fatigue, unspecified type-symptoms could be related to recent COVID. She has had pretty good improvement over the last 3 to 4 days. Patient encouraged to take a nutritional supplement if she is not hungry and stay hydrated. Unclear of the reason for the tremor/lower extremity movement. We will check blood work today including a CK level. Patient to continue to try to limit benzodiazepine use. We discussed that extraparametal symptoms could be seen with Seroquel, but these appear different. We will check urine today. Close follow-up in 2 weeks to make sure that she is improving. Hold off on starting any new medication for her dementia. Patient's home is safe. We will also check lipids and sugars which she is due for.  -     TSH 3RD GENERATION; Future  -     METABOLIC PANEL, COMPREHENSIVE; Future  -     CK; Future    2. Anxiety    3. Dementia, unspecified dementia severity, unspecified dementia type, unspecified whether behavioral, psychotic, or mood disturbance or anxiety ([facility] [address]    4. Hyperlipidemia, unspecified hyperlipidemia type  -     LIPID PANEL; Future    5. IFG (impaired fasting glucose)  -     HEMOGLOBIN A1C WITH EAG; Future    6. COVID-19    7. Tremor  -     TSH 3RD GENERATION; Future  -     CK;  Future  - MAGNESIUM; Future  -     CULTURE, URINE; Future    8. History of recurrent UTIs  -     CULTURE, URINE; Future          Chioma Bustamante MD  2/10/2023    This note was created with the help of speech recognition software Mahamed Narayanan) and may contain some 'sound alike' errors.

## 2023-02-10 NOTE — PROGRESS NOTES
Kary Díaz  Identified pt with two pt identifiers(name and ). Chief Complaint   Patient presents with    Post-COVID Symptoms     RM18// pt presenting today with post covid symptoms shaky legs and feet and fatigue       1. Have you been to the ER, urgent care clinic since your last visit? Hospitalized since your last visit? NO    2. Have you seen or consulted any other health care providers outside of the [address] [place] [address] since your last visit? Include any pap smears or colon screening. NO      Provider notified of reason for visit, vitals and flowsheets obtained on patients.      Patient received paperwork for advance directive during previous visit but has not completed at this time     Reviewed record In preparation for visit, huddled with provider and have obtained necessary documentation      Health Maintenance Due   Topic    COVID-19 Vaccine (1)    DTaP/Tdap/Td series (2 - Td or Tdap)    Medicare Yearly Exam     Flu Vaccine (1)    Lipid Screen        Wt Readings from Last 3 Encounters:   02/10/23 154 lb 12.8 oz (70.2 kg)   23 160 lb (72.6 kg)   11/10/22 173 lb (78.5 kg)     Temp Readings from Last 3 Encounters:   02/10/23 98.2 °F (36.8 °C) (Oral)   23 98.2 °F (36.8 °C)   10/04/22 98.8 °F (37.1 °C) (Oral)     BP Readings from Last 3 Encounters:   02/10/23 114/67   23 127/73   23 126/86     Pulse Readings from Last 3 Encounters:   02/10/23 64   23 68   23 83     Vitals:    02/10/23 0904   BP: 114/67   Pulse: 64   Resp: 14   Temp: 98.2 °F (36.8 °C)   TempSrc: Oral   SpO2: 97%   Weight: 154 lb 12.8 oz (70.2 kg)   Height: 5' 4\" (1.626 m)   PainSc:   0 - No pain         Learning Assessment:  :     Learning Assessment 10/14/2020   PRIMARY LEARNER Patient   HIGHEST LEVEL OF EDUCATION - PRIMARY LEARNER  [employer]   BARRIERS PRIMARY LEARNER NONE   CO-LEARNER CAREGIVER No   PRIMARY LANGUAGE ENGLISH   LEARNER PREFERENCE PRIMARY OTHER (COMMENT)   ANSWERED BY patient RELATIONSHIP SELF       Depression Screening:  :     3 most recent PHQ Screens 2/10/2023   Little interest or pleasure in doing things Not at all   Feeling down, depressed, irritable, or hopeless Not at all   Total Score PHQ 2 0   Trouble falling or staying asleep, or sleeping too much -   Feeling tired or having little energy -   Poor appetite, weight loss, or overeating -   Feeling bad about yourself - or that you are a failure or have let yourself or your family down -   Trouble concentrating on things such as school, work, reading, or watching TV -   Moving or speaking so slowly that other people could have noticed; or the opposite being so fidgety that others notice -   Thoughts of being better off dead, or hurting yourself in some way -   PHQ 9 Score -   How difficult have these problems made it for you to do your work, take care of your home and get along with others -       Fall Risk Assessment:  :     Fall Risk Assessment, last 12 mths 10/4/2022   Able to walk? Yes   Fall in past 12 months? 0   Do you feel unsteady? 0   Are you worried about falling 0       Abuse Screening:  :     Abuse Screening Questionnaire 4/27/2021 2/24/2021 4/30/2020 4/7/2020 10/3/2019 12/4/2018 8/8/2018   Do you ever feel afraid of your partner? N N N N N N N   Are you in a relationship with someone who physically or mentally threatens you? N N N N N N N   Is it safe for you to go home? Y Y Y Y Y Y Y       ADL Screening:  :     No flowsheet data found. Medication reconciliation up to date and corrected with patient at this time.

## 2023-02-11 DIAGNOSIS — G43.809 OTHER MIGRAINE WITHOUT STATUS MIGRAINOSUS, NOT INTRACTABLE: ICD-10-CM

## 2023-02-11 RX ORDER — BUTALBITAL, ACETAMINOPHEN AND CAFFEINE 300; 40; 50 MG/1; MG/1; MG/1
CAPSULE ORAL
Qty: 20 CAPSULE | Refills: 1 | OUTPATIENT
Start: 2023-02-11

## 2023-02-27 DIAGNOSIS — G43.809 OTHER MIGRAINE WITHOUT STATUS MIGRAINOSUS, NOT INTRACTABLE: ICD-10-CM

## 2023-02-27 RX ORDER — BUTALBITAL, ACETAMINOPHEN AND CAFFEINE 300; 40; 50 MG/1; MG/1; MG/1
CAPSULE ORAL
Qty: 20 CAPSULE | Refills: 1 | Status: SHIPPED | OUTPATIENT
Start: 2023-02-27

## 2023-03-01 ENCOUNTER — TELEPHONE (OUTPATIENT)
Dept: NEUROLOGY | Age: 68
End: 2023-03-01

## 2023-03-08 DIAGNOSIS — F41.9 ANXIETY: ICD-10-CM

## 2023-03-08 RX ORDER — ALPRAZOLAM 0.5 MG/1
TABLET ORAL
Qty: 30 TABLET | Refills: 0 | Status: SHIPPED | OUTPATIENT
Start: 2023-03-08

## 2023-03-14 ENCOUNTER — TRANSCRIBE ORDER (OUTPATIENT)
Dept: SCHEDULING | Age: 68
End: 2023-03-14

## 2023-03-14 DIAGNOSIS — Z12.31 SCREENING MAMMOGRAM FOR HIGH-RISK PATIENT: Primary | ICD-10-CM

## 2023-03-16 ENCOUNTER — OFFICE VISIT (OUTPATIENT)
Dept: NEUROLOGY | Age: 68
End: 2023-03-16

## 2023-03-16 VITALS
HEIGHT: 64 IN | SYSTOLIC BLOOD PRESSURE: 157 MMHG | TEMPERATURE: 97.1 F | WEIGHT: 151 LBS | HEART RATE: 74 BPM | DIASTOLIC BLOOD PRESSURE: 74 MMHG | RESPIRATION RATE: 20 BRPM | OXYGEN SATURATION: 96 % | BODY MASS INDEX: 25.78 KG/M2

## 2023-03-16 DIAGNOSIS — G30.0 EARLY ONSET ALZHEIMER'S DEMENTIA WITHOUT BEHAVIORAL DISTURBANCE (HCC): Primary | ICD-10-CM

## 2023-03-16 DIAGNOSIS — F90.0 ATTENTION DEFICIT HYPERACTIVITY DISORDER (ADHD), PREDOMINANTLY INATTENTIVE TYPE: ICD-10-CM

## 2023-03-16 DIAGNOSIS — F02.80 EARLY ONSET ALZHEIMER'S DEMENTIA WITHOUT BEHAVIORAL DISTURBANCE (HCC): Primary | ICD-10-CM

## 2023-03-16 RX ORDER — ATOMOXETINE 25 MG/1
25 CAPSULE ORAL DAILY
Qty: 30 CAPSULE | Refills: 4 | Status: SHIPPED | OUTPATIENT
Start: 2023-03-16
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
## 2023-03-19 DIAGNOSIS — G43.809 OTHER MIGRAINE WITHOUT STATUS MIGRAINOSUS, NOT INTRACTABLE: ICD-10-CM

## 2023-03-19 RX ORDER — BUTALBITAL, ACETAMINOPHEN AND CAFFEINE 300; 40; 50 MG/1; MG/1; MG/1
CAPSULE ORAL
Qty: 20 CAPSULE | Refills: 1 | Status: SHIPPED | OUTPATIENT
Start: 2023-03-19

## 2023-03-21 ENCOUNTER — TELEPHONE (OUTPATIENT)
Dept: NEUROLOGY | Age: 68
End: 2023-03-21

## 2023-03-31 DIAGNOSIS — G43.809 OTHER MIGRAINE WITHOUT STATUS MIGRAINOSUS, NOT INTRACTABLE: ICD-10-CM

## 2023-03-31 RX ORDER — BUTALBITAL, ACETAMINOPHEN AND CAFFEINE 300; 40; 50 MG/1; MG/1; MG/1
CAPSULE ORAL
Qty: 20 CAPSULE | Refills: 1 | Status: SHIPPED | OUTPATIENT
Start: 2023-03-31

## 2023-04-03 ENCOUNTER — TELEPHONE (OUTPATIENT)
Dept: NEUROLOGY | Age: 68
End: 2023-04-03

## 2023-04-03 NOTE — TELEPHONE ENCOUNTER
Pt daughter would like to speak with nurse or provider. States that over the past few days mother has been having random conversations and possibly more than typical has balance issues. Pt just recently discovered family hx  of TIA.

## 2023-04-04 RX ORDER — ALPRAZOLAM 0.5 MG/1
TABLET ORAL
Qty: 30 TABLET | Refills: 0 | Status: SHIPPED
Start: 2023-04-04

## 2023-04-18 ENCOUNTER — OFFICE VISIT (OUTPATIENT)
Dept: INTERNAL MEDICINE CLINIC | Age: 68
End: 2023-04-18
Payer: MEDICARE

## 2023-04-18 VITALS
OXYGEN SATURATION: 94 % | WEIGHT: 154 LBS | HEIGHT: 64 IN | BODY MASS INDEX: 26.29 KG/M2 | DIASTOLIC BLOOD PRESSURE: 82 MMHG | TEMPERATURE: 98.5 F | HEART RATE: 70 BPM | RESPIRATION RATE: 16 BRPM | SYSTOLIC BLOOD PRESSURE: 144 MMHG

## 2023-04-18 DIAGNOSIS — J01.90 ACUTE NON-RECURRENT SINUSITIS, UNSPECIFIED LOCATION: Primary | ICD-10-CM

## 2023-04-18 DIAGNOSIS — J30.2 SEASONAL ALLERGIC RHINITIS, UNSPECIFIED TRIGGER: ICD-10-CM

## 2023-04-18 PROCEDURE — 1101F PT FALLS ASSESS-DOCD LE1/YR: CPT | Performed by: INTERNAL MEDICINE

## 2023-04-18 PROCEDURE — G8536 NO DOC ELDER MAL SCRN: HCPCS | Performed by: INTERNAL MEDICINE

## 2023-04-18 PROCEDURE — 99213 OFFICE O/P EST LOW 20 MIN: CPT | Performed by: INTERNAL MEDICINE

## 2023-04-18 PROCEDURE — G8399 PT W/DXA RESULTS DOCUMENT: HCPCS | Performed by: INTERNAL MEDICINE

## 2023-04-18 PROCEDURE — 1090F PRES/ABSN URINE INCON ASSESS: CPT | Performed by: INTERNAL MEDICINE

## 2023-04-18 PROCEDURE — 3017F COLORECTAL CA SCREEN DOC REV: CPT | Performed by: INTERNAL MEDICINE

## 2023-04-18 PROCEDURE — G9899 SCRN MAM PERF RSLTS DOC: HCPCS | Performed by: INTERNAL MEDICINE

## 2023-04-18 PROCEDURE — G8417 CALC BMI ABV UP PARAM F/U: HCPCS | Performed by: INTERNAL MEDICINE

## 2023-04-18 PROCEDURE — G8510 SCR DEP NEG, NO PLAN REQD: HCPCS | Performed by: INTERNAL MEDICINE

## 2023-04-18 PROCEDURE — G8427 DOCREV CUR MEDS BY ELIG CLIN: HCPCS | Performed by: INTERNAL MEDICINE

## 2023-04-18 RX ORDER — CEFDINIR 300 MG/1
300 CAPSULE ORAL 2 TIMES DAILY
Qty: 20 CAPSULE | Refills: 0 | Status: SHIPPED | OUTPATIENT
Start: 2023-04-18 | End: 2023-04-28

## 2023-04-18 RX ORDER — OXYMETAZOLINE HYDROCHLORIDE 0.05 G/100ML
2 SPRAY NASAL 2 TIMES DAILY
Qty: 1 EACH | Refills: 0
Start: 2023-04-18 | End: 2023-04-21

## 2023-04-18 RX ORDER — PREDNISONE 20 MG/1
40 TABLET ORAL DAILY
Qty: 10 TABLET | Refills: 0 | Status: SHIPPED | OUTPATIENT
Start: 2023-04-18 | End: 2023-04-23

## 2023-04-18 NOTE — PROGRESS NOTES
HISTORY OF PRESENT ILLNESS    Chief Complaint   Patient presents with    Ear Pain     Bilateral - couple weeks    Sore Throat     Couple weeks      Sinus Pain     Facial - pressure - finished Augmentin today. Presents for follow-up  Patient of Dr. Ty Mireles. I met her 2 weeks ago, with her daughter Emily Cloud, who if with her today. Patient hx mildly impaired due to dementia. Presents with sinus congestion, mild headache, bilateral ear pressure. Reports mild sore throat. Denies any significant fever, chills, cough, chest pain. She was seen at urgent care on April 11 for this. Given augmentin 4/11/23 with no relief. Still taking Augmentin with no side effects. She does have a history of seasonal allergies and is taking fluticasone nasal spray and Allegra daily. Energy levels are fair but perhaps feels a little bit tired. Given cefdinir for UTI 12/8  On review of records, she was treated with prednisone and Omnicef for similar symptoms in 2018. Review of Systems   All other systems reviewed and are negative, except as noted in HPI    Past Medical and Surgical History   has a past medical history of Anxiety, Dementia ([address] Gluten intolerance, Hypercholesterolemia, and Sinus problem. has a past surgical history that includes pr colpopexy vaginal intraperitoneal approach; hx shoulder arthroscopy (Bilateral); hx orthopaedic (Left); hx tonsillectomy; hx heent; hx colonoscopy; and hx gyn.     reports that she has never smoked. She has never used smokeless tobacco. She reports that she does not currently use alcohol. She reports that she does not use drugs. family history includes Celiac Disease in her brother and father; Diabetes in her maternal grandmother and mother; Hypertension in her mother; No Known Problems in her brother and brother. Physical Exam   Nursing note and vitals reviewed. Blood pressure (!) 144/82, pulse 70, temperature 98.5 °F (36.9 °C), temperature source Oral, resp. rate 16, height 5' 4\" (1.626 m), weight 154 lb (69.9 kg), SpO2 94 %. Constitutional:  No distress. Eyes: Conjunctivae are normal.   Ears:  Hearing grossly intact  Bilateral ear canals normal.  Normal tympanic membranes. Mild pharyngeal erythema. Cardiovascular: Normal rate. regular rhythm, no murmurs or gallops  No edema  Pulmonary/Chest: Effort normal.   CTAB  Musculoskeletal: moves all 4 extremities   Neurological: Alert and oriented to person, place, and time. Skin: No visible rash noted. Psychiatric: Normal mood and affect. Behavior is normal.     Assessment and Plan    Diagnoses and all orders for this visit:    1. Acute non-recurrent sinusitis, unspecified location  2. Seasonal allergic rhinitis, unspecified trigger  Primary issue here, I think, is chronic seasonal allergies with increased congestion causing eustachian tube dysfunction. Recommend continuing Flonase and Allegra for allergies. Add Afrin for a few days for decongestant use. That may be all she needs, but she does have a history of failure to improve and required steroid and cephalosporin. All 3 prescriptions prescribed. Return to clinic if not improving.  -     cefdinir (OMNICEF) 300 mg capsule; Take 1 Capsule by mouth two (2) times a day for 10 days. -     predniSONE (DELTASONE) 20 mg tablet; Take 2 Tablets by mouth daily for 5 days. -     Afrin Sinus, oxymetazoline, 0.05 % nasal spray; 2 Sprays by Both Nostrils route two (2) times a day for 3 days. lab results and schedule of future lab studies reviewed with patient  reviewed medications and side effects in detail    Return to clinic for further evaluation if new symptoms develop        Current Outpatient Medications   Medication Sig    cefdinir (OMNICEF) 300 mg capsule Take 1 Capsule by mouth two (2) times a day for 10 days. predniSONE (DELTASONE) 20 mg tablet Take 2 Tablets by mouth daily for 5 days.     Afrin Sinus, oxymetazoline, 0.05 % nasal spray 2 Sprays by Both Nostrils route two (2) times a day for 3 days. butalbital-acetaminophen-caff (FIORICET) -40 mg per capsule TAKE 1 CAPSULE BY MOUTH EVERY 6 HOURS AS NEEDED FOR PAIN    ALPRAZolam (XANAX) 0.5 mg tablet TAKE 1 TABLET BY MOUTH EVERY DAY AS NEEDED FOR ANXIETY    atomoxetine (Strattera) 25 mg capsule Take 1 Capsule by mouth daily. QUEtiapine (SEROquel) 25 mg tablet Take 1 Tablet by mouth nightly. sertraline (ZOLOFT) 100 mg tablet Take 1 Tablet by mouth daily. dicyclomine (BENTYL) 10 mg capsule TAKE 1 CAPSULE BY MOUTH THREE (3) TIMES DAILY AS NEEDED FOR ABDOMINAL CRAMPS.    simvastatin (ZOCOR) 10 mg tablet TAKE 1 TABLET BY MOUTH EVERY DAY AT NIGHT    donepeziL (ARICEPT) 10 mg tablet Take 1 Tablet by mouth nightly. vitamin A 2,400 mcg capsule Take 1 Capsule by mouth daily. simethicone (GAS-X) 125 mg capsule Take 1 Capsule by mouth four (4) times daily as needed for Flatulence. fexofenadine (ALLEGRA) 180 mg tablet Take  by mouth.    cranberry fruit concentrate (AZO CRANBERRY PO) Take  by mouth. fluticasone propionate (FLONASE) 50 mcg/actuation nasal spray SPRAY 2 SPRAYS INTO EACH NOSTRIL EVERY DAY    ascorbic acid (VITAMIN C PO) Take  by mouth. zinc sulfate (ZINC-15 PO) Take  by mouth. MAGNESIUM PO Take  by mouth. albuterol (Ventolin HFA) 90 mcg/actuation inhaler Take 1 Puff by inhalation every four (4) hours as needed for Wheezing. acetaminophen (TYLENOL) 325 mg tablet Tylenol 325 mg tablet   PO in office    famotidine (PEPCID) 20 mg tablet Take 1 Tab by mouth nightly. cholecalciferol, VITAMIN D3, (VITAMIN D3) 5,000 unit tab tablet Take 1 Tab by mouth two (2) times a day. (Patient taking differently: Take 1 Tablet by mouth daily.)     No current facility-administered medications for this visit.

## 2023-04-23 DIAGNOSIS — Z12.31 SCREENING MAMMOGRAM FOR HIGH-RISK PATIENT: Primary | ICD-10-CM

## 2023-04-28 DIAGNOSIS — G43.809 OTHER MIGRAINE WITHOUT STATUS MIGRAINOSUS, NOT INTRACTABLE: ICD-10-CM

## 2023-04-29 RX ORDER — BUTALBITAL, ACETAMINOPHEN AND CAFFEINE 300; 40; 50 MG/1; MG/1; MG/1
CAPSULE ORAL
Qty: 20 CAPSULE | Refills: 1 | Status: SHIPPED | OUTPATIENT
Start: 2023-04-29

## 2023-05-04 DIAGNOSIS — F41.9 ANXIETY: ICD-10-CM

## 2023-05-04 RX ORDER — ALPRAZOLAM 0.5 MG/1
TABLET ORAL
Qty: 30 TABLET | Refills: 0 | Status: SHIPPED | OUTPATIENT
Start: 2023-05-04

## 2023-05-05 DIAGNOSIS — G43.809 OTHER MIGRAINE WITHOUT STATUS MIGRAINOSUS, NOT INTRACTABLE: ICD-10-CM

## 2023-05-05 RX ORDER — BUTALBITAL, ACETAMINOPHEN AND CAFFEINE 300; 40; 50 MG/1; MG/1; MG/1
CAPSULE ORAL
Qty: 20 CAPSULE | Refills: 1 | Status: SHIPPED | OUTPATIENT
Start: 2023-05-05

## 2023-05-08 DIAGNOSIS — G47.00 INSOMNIA, UNSPECIFIED: ICD-10-CM

## 2023-05-08 RX ORDER — QUETIAPINE FUMARATE 25 MG/1
TABLET, FILM COATED ORAL
Qty: 30 TABLET | Refills: 3 | Status: SHIPPED | OUTPATIENT
Start: 2023-05-08

## 2023-05-11 RX ORDER — DONEPEZIL HYDROCHLORIDE 10 MG/1
TABLET, FILM COATED ORAL
Qty: 30 TABLET | Refills: 5 | Status: SHIPPED | OUTPATIENT
Start: 2023-05-11

## 2023-05-18 DIAGNOSIS — G43.809 OTHER MIGRAINE, NOT INTRACTABLE, WITHOUT STATUS MIGRAINOSUS: ICD-10-CM

## 2023-05-19 RX ORDER — BUTALBITAL, ACETAMINOPHEN AND CAFFEINE 300; 40; 50 MG/1; MG/1; MG/1
CAPSULE ORAL
Qty: 20 CAPSULE | Refills: 1 | Status: SHIPPED | OUTPATIENT
Start: 2023-05-19

## 2023-06-01 DIAGNOSIS — G43.809 OTHER MIGRAINE, NOT INTRACTABLE, WITHOUT STATUS MIGRAINOSUS: ICD-10-CM

## 2023-06-01 RX ORDER — BUTALBITAL, ACETAMINOPHEN AND CAFFEINE 300; 40; 50 MG/1; MG/1; MG/1
CAPSULE ORAL
Qty: 20 CAPSULE | Refills: 1 | Status: SHIPPED | OUTPATIENT
Start: 2023-06-01

## 2023-06-04 DIAGNOSIS — F41.9 ANXIETY DISORDER, UNSPECIFIED: ICD-10-CM

## 2023-06-05 RX ORDER — ALPRAZOLAM 0.5 MG/1
TABLET ORAL
Qty: 15 TABLET | Refills: 0 | Status: SHIPPED | OUTPATIENT
Start: 2023-06-05 | End: 2023-06-20

## 2023-06-07 ENCOUNTER — OFFICE VISIT (OUTPATIENT)
Age: 68
End: 2023-06-07
Payer: MEDICARE

## 2023-06-07 DIAGNOSIS — G30.0 ALZHEIMER'S DISEASE WITH EARLY ONSET (CODE) (HCC): Primary | ICD-10-CM

## 2023-06-07 DIAGNOSIS — F41.9 ANXIETY AND DEPRESSION: ICD-10-CM

## 2023-06-07 DIAGNOSIS — F32.A ANXIETY AND DEPRESSION: ICD-10-CM

## 2023-06-07 PROCEDURE — 1123F ACP DISCUSS/DSCN MKR DOCD: CPT | Performed by: CLINICAL NEUROPSYCHOLOGIST

## 2023-06-07 PROCEDURE — 90791 PSYCH DIAGNOSTIC EVALUATION: CPT | Performed by: CLINICAL NEUROPSYCHOLOGIST

## 2023-06-07 NOTE — PROGRESS NOTES
exacerbated by mild depression, anxiety, and stress/family dynamics. The profile is inconsistent with pseudodementia, though certainly anxiety/stress/depression will enhance underlying memory problems. Patient lacks insight and struggles with executive functioning, raising concerns about her capacity to independently care for her young grandchildren without some supervision. I suggest consideration for medication for memory, attention, and depression/anxiety. Counseling may prove helpful as well. She should be encouraged to remain as mentally, socially, and physically active as possible. I do not find her competent to make medical decisions, financial decisions, to vote, to , or to own a firearm. A POA should be established if this has not been done so already. She also likely requires supervision for those domains pertaining to memory. This includes medication management supervision and supervision of financial dealings. I would recommend she hold off on driving, and see if attention medications help with her reaction time and processing speed, at which point I suggest a formal evaluation of driving safety. The family is supportive and so long as they are able to assist with the supervision as noted, I agree with her current living arrangement. Baseline now established. Follow up prn. Clinical correlation is, of course, indicated. I will discuss these findings with the patient when she follows up with me in the near future. A follow up Neuropsychological Evaluation is indicated on a prn basis, especially if there are any cognitive and/or emotional changes.        DIAGNOSES:             Dementia - Moderate, Early Onset                                      Depression and Anxiety - Mild     Since I saw her last, the patient reports that her memory is fine and she can remember everything./  She had Covid really bad and continues to have leg shaking issues and had Covid in

## 2023-06-21 DIAGNOSIS — F41.9 ANXIETY DISORDER, UNSPECIFIED: ICD-10-CM

## 2023-06-21 RX ORDER — ALPRAZOLAM 0.5 MG/1
TABLET ORAL
Qty: 15 TABLET | Refills: 0 | OUTPATIENT
Start: 2023-06-21 | End: 2023-07-06

## 2023-06-28 DIAGNOSIS — G43.809 OTHER MIGRAINE, NOT INTRACTABLE, WITHOUT STATUS MIGRAINOSUS: ICD-10-CM

## 2023-06-28 RX ORDER — BUTALBITAL, ACETAMINOPHEN AND CAFFEINE 300; 40; 50 MG/1; MG/1; MG/1
CAPSULE ORAL
Qty: 20 CAPSULE | Refills: 1 | Status: SHIPPED | OUTPATIENT
Start: 2023-06-28

## 2023-07-01 DIAGNOSIS — F41.9 ANXIETY DISORDER, UNSPECIFIED: ICD-10-CM

## 2023-07-03 RX ORDER — ALPRAZOLAM 0.5 MG/1
TABLET ORAL
Qty: 15 TABLET | Refills: 0 | Status: SHIPPED | OUTPATIENT
Start: 2023-07-03 | End: 2023-07-18

## 2023-07-07 DIAGNOSIS — G43.809 OTHER MIGRAINE, NOT INTRACTABLE, WITHOUT STATUS MIGRAINOSUS: ICD-10-CM

## 2023-07-07 DIAGNOSIS — F41.9 ANXIETY DISORDER, UNSPECIFIED: ICD-10-CM

## 2023-07-10 ENCOUNTER — PROCEDURE VISIT (OUTPATIENT)
Age: 68
End: 2023-07-10
Payer: MEDICARE

## 2023-07-10 DIAGNOSIS — Z86.59 HISTORY OF ANXIETY: ICD-10-CM

## 2023-07-10 DIAGNOSIS — G30.1 MODERATE LATE ONSET ALZHEIMER'S DEMENTIA WITHOUT BEHAVIORAL DISTURBANCE, PSYCHOTIC DISTURBANCE, MOOD DISTURBANCE, OR ANXIETY (HCC): Primary | ICD-10-CM

## 2023-07-10 DIAGNOSIS — F32.1 MODERATE MAJOR DEPRESSION (HCC): ICD-10-CM

## 2023-07-10 DIAGNOSIS — F02.B0 MODERATE LATE ONSET ALZHEIMER'S DEMENTIA WITHOUT BEHAVIORAL DISTURBANCE, PSYCHOTIC DISTURBANCE, MOOD DISTURBANCE, OR ANXIETY (HCC): Primary | ICD-10-CM

## 2023-07-10 PROCEDURE — 96139 PSYCL/NRPSYC TST TECH EA: CPT | Performed by: CLINICAL NEUROPSYCHOLOGIST

## 2023-07-10 PROCEDURE — 96132 NRPSYC TST EVAL PHYS/QHP 1ST: CPT | Performed by: CLINICAL NEUROPSYCHOLOGIST

## 2023-07-10 PROCEDURE — 96136 PSYCL/NRPSYC TST PHY/QHP 1ST: CPT | Performed by: CLINICAL NEUROPSYCHOLOGIST

## 2023-07-10 PROCEDURE — 96133 NRPSYC TST EVAL PHYS/QHP EA: CPT | Performed by: CLINICAL NEUROPSYCHOLOGIST

## 2023-07-10 PROCEDURE — 96138 PSYCL/NRPSYC TECH 1ST: CPT | Performed by: CLINICAL NEUROPSYCHOLOGIST

## 2023-07-10 PROCEDURE — 96137 PSYCL/NRPSYC TST PHY/QHP EA: CPT | Performed by: CLINICAL NEUROPSYCHOLOGIST

## 2023-07-11 DIAGNOSIS — E78.5 HYPERLIPIDEMIA, UNSPECIFIED: ICD-10-CM

## 2023-07-12 DIAGNOSIS — G43.809 OTHER MIGRAINE, NOT INTRACTABLE, WITHOUT STATUS MIGRAINOSUS: ICD-10-CM

## 2023-07-12 RX ORDER — SERTRALINE HYDROCHLORIDE 100 MG/1
TABLET, FILM COATED ORAL
Qty: 90 TABLET | Refills: 1 | Status: SHIPPED | OUTPATIENT
Start: 2023-07-12

## 2023-07-12 RX ORDER — BUTALBITAL, ACETAMINOPHEN AND CAFFEINE 300; 40; 50 MG/1; MG/1; MG/1
CAPSULE ORAL
Qty: 20 CAPSULE | Refills: 1 | OUTPATIENT
Start: 2023-07-12

## 2023-07-12 RX ORDER — SIMVASTATIN 10 MG
TABLET ORAL
Qty: 90 TABLET | Refills: 0 | Status: SHIPPED | OUTPATIENT
Start: 2023-07-12

## 2023-07-13 ENCOUNTER — OFFICE VISIT (OUTPATIENT)
Age: 68
End: 2023-07-13

## 2023-07-13 VITALS
OXYGEN SATURATION: 97 % | SYSTOLIC BLOOD PRESSURE: 128 MMHG | DIASTOLIC BLOOD PRESSURE: 78 MMHG | RESPIRATION RATE: 16 BRPM | HEART RATE: 78 BPM

## 2023-07-13 DIAGNOSIS — G30.0 ALZHEIMER'S DISEASE WITH EARLY ONSET (CODE) (HCC): Primary | ICD-10-CM

## 2023-07-13 RX ORDER — BUSPIRONE HYDROCHLORIDE 5 MG/1
5 TABLET ORAL 2 TIMES DAILY
Qty: 60 TABLET | Refills: 4 | Status: SHIPPED | OUTPATIENT
Start: 2023-07-13 | End: 2023-08-12

## 2023-07-13 RX ORDER — QUETIAPINE FUMARATE 50 MG/1
50 TABLET, FILM COATED ORAL
Qty: 30 TABLET | Refills: 5 | Status: SHIPPED | OUTPATIENT
Start: 2023-07-13

## 2023-07-13 RX ORDER — BUTALBITAL, ACETAMINOPHEN AND CAFFEINE 300; 40; 50 MG/1; MG/1; MG/1
CAPSULE ORAL
Qty: 20 CAPSULE | Refills: 1 | Status: SHIPPED | OUTPATIENT
Start: 2023-07-13

## 2023-07-14 NOTE — PROGRESS NOTES
Chely Shaw is a 77 y.o. female who presents with the following  Chief Complaint   Patient presents with    Follow-up     Patient is here for a follow up after a driving evaluation. Accompanied by her daughter and sister. HPI    Follow-up with daughter, girlfriend   Memory continues to be stable  Up and down   No big concerns from them  She is on Aricept, Strattera      Not driving     Seroquel working well. Want to increase to help sleep, anxiety.      She is eating well  No safety concerns  No hallucinations or delusions  No anxiety or depression  She is following with primary care and things are stable       Allergies   Allergen Reactions    Aspirin      Other reaction(s): Not Reported This Time    Ibuprofen Swelling    Bupropion Nausea And Vomiting and Rash    Lansoprazole Rash    Ofloxacin Rash    Rizatriptan Palpitations       Current Outpatient Medications   Medication Sig Dispense Refill    butalbital-APAP-caffeine -40 MG CAPS per capsule TAKE 1 CAPSULE BY MOUTH EVERY 6 HOURS AS NEEDED FOR PAIN 20 capsule 1    QUEtiapine (SEROQUEL) 50 MG tablet Take 1 tablet by mouth nightly 30 tablet 5    busPIRone (BUSPAR) 5 MG tablet Take 1 tablet by mouth 2 times daily 60 tablet 4    sertraline (ZOLOFT) 100 MG tablet TAKE 1 TABLET BY MOUTH EVERY DAY 90 tablet 1    simvastatin (ZOCOR) 10 MG tablet TAKE 1 TABLET BY MOUTH EVERY DAY AT NIGHT 90 tablet 0    ALPRAZolam (XANAX) 0.5 MG tablet TAKE 1 TABLET BY MOUTH EVERY DAY AS NEEDED FOR ANXIETY 15 tablet 0    donepezil (ARICEPT) 10 MG tablet TAKE 1 TABLET BY MOUTH NIGHTLY 30 tablet 5    QUEtiapine (SEROQUEL) 25 MG tablet TAKE 1 TABLET BY MOUTH NIGHTLY 30 tablet 3    MAGNESIUM PO Take by mouth      acetaminophen (TYLENOL) 325 MG tablet Tylenol 325 mg tablet   PO in office      albuterol sulfate HFA (PROVENTIL;VENTOLIN;PROAIR) 108 (90 Base) MCG/ACT inhaler Inhale 1 puff into the lungs every 4 hours as needed      vitamin D3 (CHOLECALCIFEROL) 125 MCG (5000

## 2023-07-28 DIAGNOSIS — G43.809 OTHER MIGRAINE, NOT INTRACTABLE, WITHOUT STATUS MIGRAINOSUS: ICD-10-CM

## 2023-07-28 RX ORDER — BUTALBITAL, ACETAMINOPHEN AND CAFFEINE 300; 40; 50 MG/1; MG/1; MG/1
CAPSULE ORAL
Qty: 20 CAPSULE | Refills: 1 | Status: SHIPPED | OUTPATIENT
Start: 2023-07-28

## 2023-08-01 DIAGNOSIS — F41.9 ANXIETY DISORDER, UNSPECIFIED: ICD-10-CM

## 2023-08-01 RX ORDER — ALPRAZOLAM 0.5 MG/1
TABLET ORAL
Qty: 15 TABLET | Refills: 0 | OUTPATIENT
Start: 2023-08-01 | End: 2023-08-16

## 2023-08-16 ENCOUNTER — TELEPHONE (OUTPATIENT)
Age: 68
End: 2023-08-16

## 2023-08-17 DIAGNOSIS — G43.809 OTHER MIGRAINE, NOT INTRACTABLE, WITHOUT STATUS MIGRAINOSUS: ICD-10-CM

## 2023-08-17 RX ORDER — BUTALBITAL, ACETAMINOPHEN AND CAFFEINE 300; 40; 50 MG/1; MG/1; MG/1
CAPSULE ORAL
Qty: 20 CAPSULE | Refills: 1 | Status: SHIPPED | OUTPATIENT
Start: 2023-08-17

## 2023-08-18 ENCOUNTER — TELEPHONE (OUTPATIENT)
Age: 68
End: 2023-08-18

## 2023-08-18 NOTE — TELEPHONE ENCOUNTER
Patient's daughter is requesting a call to see if she can reschedule patient for a later date in November, to have time to change insurances. Please contact.

## 2023-08-23 NOTE — PROGRESS NOTES
sequencing (Trailmaking Test Part B) was within the severely impaired range with a T score of 17. This latter test was discontinued. This pattern of performance is indicative of a patient who is at increased risk for day-to-day problems with executive functioning. Executive functioning abilities remain markedly impaired.  strength was moderately impaired for her dominant hand (= 29) and average for her nondominant hand (T = 45). This is a decline in dominant hand  strength over time. She was previously given the task of fine motor dexterity which was normal.  Neurologic correlation is indicated. The patient rated her current level of pain as \"0/5- No Pain\" on the Terrence-Melzack Pain Questionnaire                 Her Springer Depression Inventory -II score of 23 reflected moderate depression. Her Springer Anxiety Inventory score of 4 reflected minimal.  The patient's responses on the Personality Assessment Inventory were deemed invalid for interpretation on this test was discontinued. Impressions & Recommendations: This is the patient's second evaluation of neurocognitive status. She generated an abnormal range Neuropsychological Evaluation with respect to neurocognitive functioning. In this regard, she is showing impairments with mental status, verbal fluency, confrontation naming, visual attention, auditory learning, auditory memory, processing speed, working memory, perceptual reasoning, verbal comprehension, dominant hand  strength, and executive functioning. Casual language skills are an important strength but may serve to mask underlying cognitive deficits at times. Emotionally, moderate depression. In my opinion, this appears to be a case of moderate to severe dementia exacerbated by depression. Stress is an issue as well. Dementia is progressing.   I suggest a review of her medication management for memory and psychiatric treatment for

## 2023-08-31 DIAGNOSIS — G43.809 OTHER MIGRAINE, NOT INTRACTABLE, WITHOUT STATUS MIGRAINOSUS: ICD-10-CM

## 2023-09-01 RX ORDER — ATOMOXETINE 25 MG/1
CAPSULE ORAL
Qty: 150 CAPSULE | Refills: 0 | Status: SHIPPED | OUTPATIENT
Start: 2023-09-01

## 2023-09-01 RX ORDER — BUTALBITAL, ACETAMINOPHEN AND CAFFEINE 300; 40; 50 MG/1; MG/1; MG/1
CAPSULE ORAL
Qty: 20 CAPSULE | Refills: 1 | Status: SHIPPED | OUTPATIENT
Start: 2023-09-01

## 2023-09-19 DIAGNOSIS — G43.809 OTHER MIGRAINE, NOT INTRACTABLE, WITHOUT STATUS MIGRAINOSUS: ICD-10-CM

## 2023-09-20 RX ORDER — BUTALBITAL, ACETAMINOPHEN AND CAFFEINE 300; 40; 50 MG/1; MG/1; MG/1
CAPSULE ORAL
Qty: 20 CAPSULE | Refills: 1 | Status: SHIPPED | OUTPATIENT
Start: 2023-09-20

## 2023-10-05 ENCOUNTER — TELEPHONE (OUTPATIENT)
Age: 68
End: 2023-10-05

## 2023-10-05 DIAGNOSIS — G43.809 OTHER MIGRAINE, NOT INTRACTABLE, WITHOUT STATUS MIGRAINOSUS: ICD-10-CM

## 2023-10-05 RX ORDER — BUTALBITAL, ACETAMINOPHEN AND CAFFEINE 300; 40; 50 MG/1; MG/1; MG/1
CAPSULE ORAL
Qty: 20 CAPSULE | Refills: 1 | Status: SHIPPED | OUTPATIENT
Start: 2023-10-05

## 2023-10-05 NOTE — TELEPHONE ENCOUNTER
----- Message from Ameliejameel Luong sent at 10/4/2023  4:37 PM EDT -----  Subject: Message to Provider    QUESTIONS  Information for Provider? Pt's daughter is calling stating she would like   to request a form for temporary parking pass please.   ---------------------------------------------------------------------------  --------------  [facility]  [phone]; OK to leave message on voicemail  ---------------------------------------------------------------------------  --------------  SCRIPT ANSWERS  Relationship to Patient? Other/Third Party  Representative Name? Santa   Is the representative on the Communication Release of Information (ADRIANA)   form in Epic?  Yes

## 2023-10-13 NOTE — TELEPHONE ENCOUNTER
----- Message from Rodriguez Benson sent at 10/6/2022 12:28 PM EDT -----  Subject: Message to Provider    QUESTIONS  Information for Provider? Pt wants to know if she should take flu shot   this year. Pt started on new med and wants to know what dose she should   get.   ---------------------------------------------------------------------------  --------------  Arcelia MCGEE  [phone]; OK to leave message on voicemail  ---------------------------------------------------------------------------  --------------  SCRIPT ANSWERS  Relationship to Patient?  Self No

## 2023-10-29 DIAGNOSIS — G43.809 OTHER MIGRAINE, NOT INTRACTABLE, WITHOUT STATUS MIGRAINOSUS: ICD-10-CM

## 2023-10-30 RX ORDER — BUTALBITAL, ACETAMINOPHEN AND CAFFEINE 300; 40; 50 MG/1; MG/1; MG/1
CAPSULE ORAL
Qty: 20 CAPSULE | Refills: 1 | Status: SHIPPED | OUTPATIENT
Start: 2023-10-30

## 2023-11-14 DIAGNOSIS — G30.0 ALZHEIMER'S DISEASE WITH EARLY ONSET (CODE) (HCC): Primary | ICD-10-CM

## 2023-11-14 RX ORDER — DONEPEZIL HYDROCHLORIDE 10 MG/1
TABLET, FILM COATED ORAL
Qty: 30 TABLET | Refills: 5 | Status: SHIPPED | OUTPATIENT
Start: 2023-11-14

## 2023-11-15 DIAGNOSIS — G43.809 OTHER MIGRAINE, NOT INTRACTABLE, WITHOUT STATUS MIGRAINOSUS: ICD-10-CM

## 2023-11-15 RX ORDER — BUTALBITAL, ACETAMINOPHEN AND CAFFEINE 300; 40; 50 MG/1; MG/1; MG/1
CAPSULE ORAL
Qty: 20 CAPSULE | Refills: 1 | Status: SHIPPED | OUTPATIENT
Start: 2023-11-15

## 2023-11-15 NOTE — TELEPHONE ENCOUNTER
Chief Complaint   Patient presents with    Medication Refill       Requested Prescriptions     Pending Prescriptions Disp Refills    butalbital-APAP-caffeine -40 MG CAPS per capsule [Pharmacy Med Name: VAPSRE-[id number]-OUFA -40] 20 capsule 1     Sig: TAKE 1 CAPSULE BY MOUTH EVERY 6 HOURS AS NEEDED FOR PAIN       Allergies:   Allergies   Allergen Reactions    Aspirin      Other reaction(s): Not Reported This Time    Ibuprofen Swelling    Bupropion Nausea And Vomiting and Rash    Lansoprazole Rash    Ofloxacin Rash    Rizatriptan Palpitations       Last visit with clinic:  4/18/2023   Next visit with clinic: Visit date not found     Last visit with this provider: 2/10/2023   Next Visit with this provider: Visit date not found    Signed by Marsha MORALES  11/15/23  4:47 PM

## 2023-11-20 ENCOUNTER — TELEMEDICINE (OUTPATIENT)
Age: 68
End: 2023-11-20
Payer: MEDICARE

## 2023-11-20 DIAGNOSIS — Z86.59 HISTORY OF ANXIETY: ICD-10-CM

## 2023-11-20 DIAGNOSIS — G30.0 ALZHEIMER'S DISEASE WITH EARLY ONSET (CODE) (HCC): Primary | ICD-10-CM

## 2023-11-20 PROCEDURE — 90832 PSYTX W PT 30 MINUTES: CPT | Performed by: CLINICAL NEUROPSYCHOLOGIST

## 2023-11-20 PROCEDURE — 90785 PSYTX COMPLEX INTERACTIVE: CPT | Performed by: CLINICAL NEUROPSYCHOLOGIST

## 2023-11-20 PROCEDURE — 1123F ACP DISCUSS/DSCN MKR DOCD: CPT | Performed by: CLINICAL NEUROPSYCHOLOGIST

## 2023-11-20 NOTE — PROGRESS NOTES
Janis Ledesma, was evaluated through a synchronous (real-time) audio-video encounter. The patient (or guardian if applicable) is aware that this is a billable service, which includes applicable co-pays. This Virtual Visit was conducted with patient's (and/or legal guardian's) consent. Patient identification was verified, and a caregiver was present when appropriate. The patient was located at Home: [address] [place] [phone]  Provider was located at [facility] (Appt Dept): [address] (:  1955) is a Established patient, presenting virtually for evaluation of the following: This is a teleneuropsychology (audio/visual) visit that was performed with in the originating site at patient's home and the distance site at [facility] outpatient clinic at [place]. Verbal consent to participate in the video visit was obtained. This visit occurred during the corona (COVID -19) public health emergency and these visits were authorized by the President of the [place]. I discussed with the patient the nature of our teleneuropsych visit in that :    - I would evaluate the patient and recommend diagnostics and treatment based on my assessment and impressions, and/or provided test results and discussed these issues with the patient and/or family.    - Our sessions are not being recorded and that personal health information is protected    - Our team will provide follow-up care in person if when the patient needs it. Prior to seeing the patient I reviewed the records, including the previously completed report, the records in [place], and any updated visits from other providers since I saw the patient last.      Today, I engaged in a psychoeducational and supportive and cognitive/behavioral psychotherapy session with the patient, Estrellita Sotelo via teleneuropsychology.    I provided psychotherapy in the form of

## 2023-11-29 DIAGNOSIS — E78.5 HYPERLIPIDEMIA, UNSPECIFIED: ICD-10-CM

## 2023-11-29 RX ORDER — SIMVASTATIN 10 MG
TABLET ORAL
Qty: 90 TABLET | Refills: 0 | Status: SHIPPED | OUTPATIENT
Start: 2023-11-29

## 2023-12-01 DIAGNOSIS — G43.809 OTHER MIGRAINE, NOT INTRACTABLE, WITHOUT STATUS MIGRAINOSUS: ICD-10-CM

## 2023-12-01 RX ORDER — BUTALBITAL, ACETAMINOPHEN AND CAFFEINE 300; 40; 50 MG/1; MG/1; MG/1
CAPSULE ORAL
Qty: 20 CAPSULE | Refills: 1 | Status: SHIPPED | OUTPATIENT
Start: 2023-12-01

## 2023-12-04 ENCOUNTER — OFFICE VISIT (OUTPATIENT)
Age: 68
End: 2023-12-04
Payer: MEDICARE

## 2023-12-04 VITALS — RESPIRATION RATE: 18 BRPM | HEART RATE: 87 BPM | DIASTOLIC BLOOD PRESSURE: 77 MMHG | SYSTOLIC BLOOD PRESSURE: 150 MMHG

## 2023-12-04 DIAGNOSIS — G30.0 ALZHEIMER'S DISEASE WITH EARLY ONSET (CODE) (HCC): Primary | ICD-10-CM

## 2023-12-04 DIAGNOSIS — Z86.59 HISTORY OF ANXIETY: ICD-10-CM

## 2023-12-04 PROCEDURE — 1123F ACP DISCUSS/DSCN MKR DOCD: CPT | Performed by: NURSE PRACTITIONER

## 2023-12-04 PROCEDURE — 99215 OFFICE O/P EST HI 40 MIN: CPT | Performed by: NURSE PRACTITIONER

## 2023-12-04 RX ORDER — BUSPIRONE HYDROCHLORIDE 10 MG/1
TABLET ORAL
Qty: 60 TABLET | Refills: 5 | Status: SHIPPED | OUTPATIENT
Start: 2023-12-04

## 2023-12-04 RX ORDER — ALPRAZOLAM 0.5 MG/1
0.5 TABLET ORAL DAILY PRN
Qty: 30 TABLET | Refills: 2 | Status: SHIPPED | OUTPATIENT
Start: 2023-12-04 | End: 2024-01-03

## 2023-12-04 ASSESSMENT — PATIENT HEALTH QUESTIONNAIRE - PHQ9
SUM OF ALL RESPONSES TO PHQ QUESTIONS 1-9: 0
2. FEELING DOWN, DEPRESSED OR HOPELESS: 0
SUM OF ALL RESPONSES TO PHQ QUESTIONS 1-9: 0
SUM OF ALL RESPONSES TO PHQ9 QUESTIONS 1 & 2: 0
SUM OF ALL RESPONSES TO PHQ QUESTIONS 1-9: 0
SUM OF ALL RESPONSES TO PHQ QUESTIONS 1-9: 0
1. LITTLE INTEREST OR PLEASURE IN DOING THINGS: 0

## 2023-12-04 NOTE — PROGRESS NOTES
Raiza Lombardo is a 77 y.o. female who presents with the following  Chief Complaint   Patient presents with    Follow-up     Patient is here for AD        HPI    Follow-up with daughter  Memory continues to be stable  Up and down but doing well   She is on Aricept, Strattera   She takes these daily  Her daughter helps with remembering these  Not driving     Seroquel working well. She takes this for sleep and it is helping     She is eating well  No safety concerns  No hallucinations or delusions    Her depression is fairly stable  She has noticed her anxiety has heightened  We did try to transition onto BuSpar 5 mg twice daily  This has worked although she does tend to breakthrough sometimes with higher stress situations  We discussed increasing  And also having some Xanax on hand as needed    She did go through her orthopedic knee surgery and is thriving right now    Allergies   Allergen Reactions    Aspirin      Other reaction(s): Not Reported This Time    Ibuprofen Swelling    Bupropion Nausea And Vomiting and Rash    Lansoprazole Rash    Ofloxacin Rash    Rizatriptan Palpitations       Current Outpatient Medications   Medication Sig Dispense Refill    busPIRone (BUSPAR) 10 MG tablet 1 tablet by mouth BID 60 tablet 5    ALPRAZolam (XANAX) 0.5 MG tablet Take 1 tablet by mouth daily as needed for Sleep or Anxiety for up to 30 days.  Max Daily Amount: 0.5 mg 30 tablet 2    butalbital-APAP-caffeine -40 MG CAPS per capsule TAKE 1 CAPSULE BY MOUTH EVERY 6 HOURS AS NEEDED FOR PAIN 20 capsule 1    simvastatin (ZOCOR) 10 MG tablet TAKE 1 TABLET BY MOUTH EVERY DAY AT NIGHT 90 tablet 0    donepezil (ARICEPT) 10 MG tablet TAKE 1 TABLET BY MOUTH NIGHTLY 30 tablet 5    atomoxetine (STRATTERA) 25 MG capsule Take 1 capsule by mouth once daily 150 capsule 0    QUEtiapine (SEROQUEL) 50 MG tablet Take 1 tablet by mouth nightly 30 tablet 5    sertraline (ZOLOFT) 100 MG tablet TAKE 1 TABLET BY MOUTH EVERY DAY 90 tablet 1

## 2023-12-12 RX ORDER — BUSPIRONE HYDROCHLORIDE 5 MG/1
5 TABLET ORAL 2 TIMES DAILY
Qty: 60 TABLET | Refills: 4 | OUTPATIENT
Start: 2023-12-12

## 2024-01-10 ENCOUNTER — TELEPHONE (OUTPATIENT)
Age: 69
End: 2024-01-10
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
## 2024-01-10 NOTE — TELEPHONE ENCOUNTER
Patients daughter would like a call from Dr. MCGINNIS regarding her mothers safety. Santa did not go into detail she would like to discuss with

## 2024-01-26 ENCOUNTER — TELEPHONE (OUTPATIENT)
Age: 69
End: 2024-01-26

## 2024-01-26 NOTE — TELEPHONE ENCOUNTER
Patients daughter is requesting a letter stating her mother has Dementia and no longer drive for her insurance company [employer]. If possible she would like it emailed to her or she can pick it up if not able to email.    Email address- [email or web address]

## 2024-01-26 NOTE — TELEPHONE ENCOUNTER
Pt and Partner came into the office to get some information updated and wanted to know about getting a refill on the Alprazolam (Xanax). They would like for the script to be sent to [facility] [place].

## 2024-01-31 ENCOUNTER — TELEPHONE (OUTPATIENT)
Age: 69
End: 2024-01-31

## 2024-01-31 DIAGNOSIS — F41.9 ANXIETY DISORDER, UNSPECIFIED TYPE: Primary | ICD-10-CM

## 2024-01-31 RX ORDER — ALPRAZOLAM 0.5 MG/1
0.5 TABLET ORAL DAILY
Qty: 30 TABLET | Refills: 0 | Status: SHIPPED | OUTPATIENT
Start: 2024-01-31 | End: 2024-03-01

## 2024-01-31 NOTE — TELEPHONE ENCOUNTER
Pt and Partner came into the office to get some information updated and wanted to know about getting a refill on the Alprazolam (Xanax). They would like for the script to be sent to [facility] [place].

## 2024-02-29 RX ORDER — ATOMOXETINE 25 MG/1
CAPSULE ORAL
Qty: 30 CAPSULE | Refills: 3 | Status: SHIPPED | OUTPATIENT
Start: 2024-02-29

## 2024-03-01 DIAGNOSIS — E78.5 HYPERLIPIDEMIA, UNSPECIFIED: ICD-10-CM

## 2024-03-02 DIAGNOSIS — Z86.59 HISTORY OF ANXIETY: ICD-10-CM

## 2024-03-02 RX ORDER — BUSPIRONE HYDROCHLORIDE 10 MG/1
TABLET ORAL
Qty: 60 TABLET | Refills: 5 | Status: SHIPPED | OUTPATIENT
Start: 2024-03-02

## 2024-03-02 RX ORDER — BUSPIRONE HYDROCHLORIDE 5 MG/1
5 TABLET ORAL 2 TIMES DAILY
Qty: 60 TABLET | Refills: 4 | OUTPATIENT
Start: 2024-03-02

## 2024-03-04 RX ORDER — SIMVASTATIN 10 MG
TABLET ORAL
Qty: 90 TABLET | Refills: 0 | Status: SHIPPED | OUTPATIENT
Start: 2024-03-04

## 2024-03-05 RX ORDER — QUETIAPINE FUMARATE 50 MG/1
50 TABLET, FILM COATED ORAL NIGHTLY
Qty: 30 TABLET | Refills: 5 | Status: SHIPPED | OUTPATIENT
Start: 2024-03-05

## 2024-05-13 ENCOUNTER — TELEPHONE (OUTPATIENT)
Age: 69
End: 2024-05-13

## 2024-05-13 RX ORDER — ATOMOXETINE 25 MG/1
25 CAPSULE ORAL DAILY
Qty: 30 CAPSULE | Refills: 3 | Status: CANCELLED | OUTPATIENT
Start: 2024-05-13

## 2024-05-13 RX ORDER — ATOMOXETINE 25 MG/1
25 CAPSULE ORAL DAILY
Qty: 90 CAPSULE | Refills: 1 | Status: SHIPPED | OUTPATIENT
Start: 2024-05-13

## 2024-05-13 NOTE — TELEPHONE ENCOUNTER
Patient came in office asking for atomoxedtine refills be sent to [facility] on [address]

## 2024-06-11 ENCOUNTER — OFFICE VISIT (OUTPATIENT)
Age: 69
End: 2024-06-11
Payer: COMMERCIAL

## 2024-06-11 VITALS
SYSTOLIC BLOOD PRESSURE: 132 MMHG | BODY MASS INDEX: 27.83 KG/M2 | RESPIRATION RATE: 16 BRPM | WEIGHT: 163 LBS | HEART RATE: 79 BPM | DIASTOLIC BLOOD PRESSURE: 80 MMHG | TEMPERATURE: 98.8 F | OXYGEN SATURATION: 95 % | HEIGHT: 64 IN

## 2024-06-11 DIAGNOSIS — R73.01 IMPAIRED FASTING GLUCOSE: ICD-10-CM

## 2024-06-11 DIAGNOSIS — R03.0 ELEVATED BP WITHOUT DIAGNOSIS OF HYPERTENSION: ICD-10-CM

## 2024-06-11 DIAGNOSIS — Z78.0 POST-MENOPAUSAL: ICD-10-CM

## 2024-06-11 DIAGNOSIS — Z12.31 BREAST CANCER SCREENING BY MAMMOGRAM: ICD-10-CM

## 2024-06-11 DIAGNOSIS — F41.9 ANXIETY DISORDER, UNSPECIFIED TYPE: ICD-10-CM

## 2024-06-11 DIAGNOSIS — E78.5 HYPERLIPIDEMIA, UNSPECIFIED HYPERLIPIDEMIA TYPE: ICD-10-CM

## 2024-06-11 DIAGNOSIS — E78.5 HYPERLIPIDEMIA, UNSPECIFIED HYPERLIPIDEMIA TYPE: Primary | ICD-10-CM

## 2024-06-11 DIAGNOSIS — F03.90 DEMENTIA WITHOUT BEHAVIORAL DISTURBANCE, PSYCHOTIC DISTURBANCE, MOOD DISTURBANCE, OR ANXIETY, UNSPECIFIED DEMENTIA SEVERITY, UNSPECIFIED DEMENTIA TYPE (HCC): ICD-10-CM

## 2024-06-11 PROCEDURE — 1123F ACP DISCUSS/DSCN MKR DOCD: CPT | Performed by: INTERNAL MEDICINE

## 2024-06-11 PROCEDURE — 99214 OFFICE O/P EST MOD 30 MIN: CPT | Performed by: INTERNAL MEDICINE

## 2024-06-11 SDOH — ECONOMIC STABILITY: FOOD INSECURITY: WITHIN THE PAST 12 MONTHS, YOU WORRIED THAT YOUR FOOD WOULD RUN OUT BEFORE YOU GOT MONEY TO BUY MORE.: NEVER TRUE

## 2024-06-11 SDOH — ECONOMIC STABILITY: FOOD INSECURITY: WITHIN THE PAST 12 MONTHS, THE FOOD YOU BOUGHT JUST DIDN'T LAST AND YOU DIDN'T HAVE MONEY TO GET MORE.: NEVER TRUE

## 2024-06-11 SDOH — ECONOMIC STABILITY: HOUSING INSECURITY
IN THE LAST 12 MONTHS, WAS THERE A TIME WHEN YOU DID NOT HAVE A STEADY PLACE TO SLEEP OR SLEPT IN A SHELTER (INCLUDING NOW)?: NO

## 2024-06-11 SDOH — ECONOMIC STABILITY: INCOME INSECURITY: HOW HARD IS IT FOR YOU TO PAY FOR THE VERY BASICS LIKE FOOD, HOUSING, MEDICAL CARE, AND HEATING?: NOT HARD AT ALL

## 2024-06-11 ASSESSMENT — ENCOUNTER SYMPTOMS
BACK PAIN: 0
CONSTIPATION: 0
CHEST TIGHTNESS: 0
SHORTNESS OF BREATH: 0
DIARRHEA: 0
ABDOMINAL PAIN: 0

## 2024-06-11 ASSESSMENT — PATIENT HEALTH QUESTIONNAIRE - PHQ9
SUM OF ALL RESPONSES TO PHQ9 QUESTIONS 1 & 2: 1
SUM OF ALL RESPONSES TO PHQ QUESTIONS 1-9: 1
1. LITTLE INTEREST OR PLEASURE IN DOING THINGS: NOT AT ALL
SUM OF ALL RESPONSES TO PHQ QUESTIONS 1-9: 1
2. FEELING DOWN, DEPRESSED OR HOPELESS: SEVERAL DAYS
SUM OF ALL RESPONSES TO PHQ QUESTIONS 1-9: 1
SUM OF ALL RESPONSES TO PHQ QUESTIONS 1-9: 1

## 2024-06-11 NOTE — PROGRESS NOTES
Shreya Aleman is a 69 y.o. female who presents today for Blood Work (Pt is nonfasting )  .      She has a history of   Patient Active Problem List   Diagnosis    Anxiety    Gastroesophageal reflux disease    Allergic rhinitis    Hyperlipidemia    Dementia, unspecified dementia severity, unspecified dementia type, unspecified whether behavioral, psychotic, or mood disturbance or anxiety (HCC)   .    Today patient is here for follow up.     Dementia: seeing Neurology. On Aricept, strrattera, seroquel as well as SSRI and buspirone for anxiety. Last fill for alprazolam was in May.      IFG: repeat.     BP elevated.      Knee surgery in 2023 went well.     Has 3 children. Local, but roommate Nereida Pelaez is medical POA. Will bring in forms.      Hyperlipidemia  On Statin  ROS: taking medications as instructed, no medication side effects noted  No new myalgias, no joint pains, no weakness  No TIA's, no chest pain on exertion, no dyspnea on exertion, no swelling of ankles.         Lab Results   Component Value Date/Time     CHOL 151 02/10/2023 09:48 AM     HDL 36 02/10/2023 09:48 AM     LDL 89 02/10/2023 09:48 AM     VLDL 26 02/10/2023 09:48 AM     Screening:               Colon cancer screening:  Last Colonoscopy: 2015 and                    was normal              Breast cancer screening: last mammogram 2022 and                      was normal              Cervical cancer screening:NA              Osteoporosis screening:  Last BMD:  2020 and revealed                          - Osteopenia        Immunizations:                     Immunization History   Administered Date(s) Administered    Influenza Quadv 11/03/2016    Influenza, FLUAD, (age 65 y+), Adjuvanted, 0.5mL 10/15/2022    Influenza, FLUARIX, FLULAVAL, FLUZONE (age 6 mo+) AND AFLURIA, (age 3 y+), PF, 0.5mL 11/03/2016, 10/10/2017    Influenza, FLUZONE (age 65 y+), High Dose, 0.7mL 09/20/2020    Influenza, High Dose (Fluzone 65 yrs and older) 08/14/2018, 10/25/2019

## 2024-06-12 LAB
ALBUMIN SERPL-MCNC: 4 G/DL (ref 3.5–5)
ALBUMIN/GLOB SERPL: 1.3 (ref 1.1–2.2)
ALP SERPL-CCNC: 85 U/L (ref 45–117)
ALT SERPL-CCNC: 26 U/L (ref 12–78)
ANION GAP SERPL CALC-SCNC: 6 MMOL/L (ref 5–15)
AST SERPL-CCNC: 28 U/L (ref 15–37)
BASOPHILS # BLD: 0 K/UL (ref 0–0.1)
BASOPHILS NFR BLD: 0 % (ref 0–1)
BILIRUB SERPL-MCNC: 0.3 MG/DL (ref 0.2–1)
BUN SERPL-MCNC: 15 MG/DL (ref 6–20)
BUN/CREAT SERPL: 18 (ref 12–20)
CALCIUM SERPL-MCNC: 9.3 MG/DL (ref 8.5–10.1)
CHLORIDE SERPL-SCNC: 110 MMOL/L (ref 97–108)
CHOLEST SERPL-MCNC: 242 MG/DL
CO2 SERPL-SCNC: 24 MMOL/L (ref 21–32)
CREAT SERPL-MCNC: 0.83 MG/DL (ref 0.55–1.02)
DIFFERENTIAL METHOD BLD: ABNORMAL
EOSINOPHIL # BLD: 0.1 K/UL (ref 0–0.4)
EOSINOPHIL NFR BLD: 1 % (ref 0–7)
ERYTHROCYTE [DISTWIDTH] IN BLOOD BY AUTOMATED COUNT: 16.1 % (ref 11.5–14.5)
EST. AVERAGE GLUCOSE BLD GHB EST-MCNC: 111 MG/DL
GLOBULIN SER CALC-MCNC: 3.2 G/DL (ref 2–4)
GLUCOSE SERPL-MCNC: 130 MG/DL (ref 65–100)
HBA1C MFR BLD: 5.5 % (ref 4–5.6)
HCT VFR BLD AUTO: 33.8 % (ref 35–47)
HDLC SERPL-MCNC: 55 MG/DL
HDLC SERPL: 4.4 (ref 0–5)
HGB BLD-MCNC: 9.8 G/DL (ref 11.5–16)
IMM GRANULOCYTES # BLD AUTO: 0 K/UL (ref 0–0.04)
IMM GRANULOCYTES NFR BLD AUTO: 0 % (ref 0–0.5)
LDLC SERPL CALC-MCNC: 144.8 MG/DL (ref 0–100)
LYMPHOCYTES # BLD: 1.6 K/UL (ref 0.8–3.5)
LYMPHOCYTES NFR BLD: 21 % (ref 12–49)
MCH RBC QN AUTO: 23.4 PG (ref 26–34)
MCHC RBC AUTO-ENTMCNC: 29 G/DL (ref 30–36.5)
MCV RBC AUTO: 80.9 FL (ref 80–99)
MONOCYTES # BLD: 0.7 K/UL (ref 0–1)
MONOCYTES NFR BLD: 10 % (ref 5–13)
NEUTS SEG # BLD: 5.4 K/UL (ref 1.8–8)
NEUTS SEG NFR BLD: 68 % (ref 32–75)
NRBC # BLD: 0 K/UL (ref 0–0.01)
NRBC BLD-RTO: 0 PER 100 WBC
PLATELET # BLD AUTO: 283 K/UL (ref 150–400)
PMV BLD AUTO: 10.2 FL (ref 8.9–12.9)
POTASSIUM SERPL-SCNC: 4.6 MMOL/L (ref 3.5–5.1)
PROT SERPL-MCNC: 7.2 G/DL (ref 6.4–8.2)
RBC # BLD AUTO: 4.18 M/UL (ref 3.8–5.2)
SODIUM SERPL-SCNC: 140 MMOL/L (ref 136–145)
TRIGL SERPL-MCNC: 211 MG/DL
VLDLC SERPL CALC-MCNC: 42.2 MG/DL
WBC # BLD AUTO: 7.8 K/UL (ref 3.6–11)

## 2024-06-13 DIAGNOSIS — D64.9 ANEMIA, UNSPECIFIED TYPE: Primary | ICD-10-CM

## 2024-06-14 ENCOUNTER — TELEPHONE (OUTPATIENT)
Age: 69
End: 2024-06-14

## 2024-06-14 NOTE — TELEPHONE ENCOUNTER
06/14/2024--This user called and spoke with patient's domestic partner Nereida Pelaez and let her know patient's lab results and our recommendation's. She is going to take patient to get blood work redone on Monday and a follow up appointment was made with  for Thursday the 20 th at 11:15 am. She had no question's or concerns at that time.    She also stated that patient is taking her Simvastatin.

## 2024-06-14 NOTE — TELEPHONE ENCOUNTER
----- Message from Trevon Solano MD sent at 6/13/2024  9:22 PM EDT -----  Please let roommate/friend know that labs are a bit off. Has developed a bit of an anemia. Cholesterol is also higher than expected.   I have ordered repeat labs to do early next week and want to see her late next week to discuss and evaluate for blood in her stool.   I have ordered labs.   Have her check and make sure that she is taking simvastatin.

## 2024-06-19 DIAGNOSIS — D64.9 ANEMIA, UNSPECIFIED TYPE: ICD-10-CM

## 2024-06-19 LAB
BASOPHILS # BLD: 0 K/UL (ref 0–0.1)
BASOPHILS NFR BLD: 0 % (ref 0–1)
DIFFERENTIAL METHOD BLD: ABNORMAL
EOSINOPHIL # BLD: 0.1 K/UL (ref 0–0.4)
EOSINOPHIL NFR BLD: 1 % (ref 0–7)
ERYTHROCYTE [DISTWIDTH] IN BLOOD BY AUTOMATED COUNT: 16.4 % (ref 11.5–14.5)
FERRITIN SERPL-MCNC: 11 NG/ML (ref 26–388)
HCT VFR BLD AUTO: 32.9 % (ref 35–47)
HGB BLD-MCNC: 10.3 G/DL (ref 11.5–16)
IMM GRANULOCYTES # BLD AUTO: 0 K/UL (ref 0–0.04)
IMM GRANULOCYTES NFR BLD AUTO: 0 % (ref 0–0.5)
IRON SATN MFR SERPL: 7 % (ref 20–50)
IRON SERPL-MCNC: 31 UG/DL (ref 35–150)
LYMPHOCYTES # BLD: 1.4 K/UL (ref 0.8–3.5)
LYMPHOCYTES NFR BLD: 20 % (ref 12–49)
MCH RBC QN AUTO: 24.1 PG (ref 26–34)
MCHC RBC AUTO-ENTMCNC: 31.3 G/DL (ref 30–36.5)
MCV RBC AUTO: 77 FL (ref 80–99)
MONOCYTES # BLD: 0.7 K/UL (ref 0–1)
MONOCYTES NFR BLD: 10 % (ref 5–13)
NEUTS SEG # BLD: 4.8 K/UL (ref 1.8–8)
NEUTS SEG NFR BLD: 69 % (ref 32–75)
NRBC # BLD: 0 K/UL (ref 0–0.01)
NRBC BLD-RTO: 0 PER 100 WBC
PLATELET # BLD AUTO: 269 K/UL (ref 150–400)
PMV BLD AUTO: 9.9 FL (ref 8.9–12.9)
RBC # BLD AUTO: 4.27 M/UL (ref 3.8–5.2)
RETICS # AUTO: 0.05 M/UL (ref 0.02–0.08)
RETICS/RBC NFR AUTO: 1.1 % (ref 0.7–2.1)
TIBC SERPL-MCNC: 428 UG/DL (ref 250–450)
VIT B12 SERPL-MCNC: 376 PG/ML (ref 193–986)
WBC # BLD AUTO: 6.9 K/UL (ref 3.6–11)

## 2024-07-03 ENCOUNTER — OFFICE VISIT (OUTPATIENT)
Age: 69
End: 2024-07-03
Payer: COMMERCIAL

## 2024-07-03 VITALS
DIASTOLIC BLOOD PRESSURE: 78 MMHG | HEIGHT: 64 IN | HEART RATE: 72 BPM | TEMPERATURE: 99 F | BODY MASS INDEX: 27.31 KG/M2 | RESPIRATION RATE: 16 BRPM | SYSTOLIC BLOOD PRESSURE: 136 MMHG | WEIGHT: 160 LBS | OXYGEN SATURATION: 96 %

## 2024-07-03 DIAGNOSIS — F02.B0 MODERATE LATE ONSET ALZHEIMER'S DEMENTIA WITHOUT BEHAVIORAL DISTURBANCE, PSYCHOTIC DISTURBANCE, MOOD DISTURBANCE, OR ANXIETY (HCC): ICD-10-CM

## 2024-07-03 DIAGNOSIS — D50.9 IRON DEFICIENCY ANEMIA, UNSPECIFIED IRON DEFICIENCY ANEMIA TYPE: Primary | ICD-10-CM

## 2024-07-03 DIAGNOSIS — G30.1 MODERATE LATE ONSET ALZHEIMER'S DEMENTIA WITHOUT BEHAVIORAL DISTURBANCE, PSYCHOTIC DISTURBANCE, MOOD DISTURBANCE, OR ANXIETY (HCC): ICD-10-CM

## 2024-07-03 DIAGNOSIS — F32.1 MODERATE MAJOR DEPRESSION (HCC): ICD-10-CM

## 2024-07-03 DIAGNOSIS — R51.9 NONINTRACTABLE HEADACHE, UNSPECIFIED CHRONICITY PATTERN, UNSPECIFIED HEADACHE TYPE: ICD-10-CM

## 2024-07-03 PROCEDURE — 1123F ACP DISCUSS/DSCN MKR DOCD: CPT | Performed by: INTERNAL MEDICINE

## 2024-07-03 PROCEDURE — 99214 OFFICE O/P EST MOD 30 MIN: CPT | Performed by: INTERNAL MEDICINE

## 2024-07-03 RX ORDER — FERROUS SULFATE 325(65) MG
325 TABLET ORAL 2 TIMES DAILY
Qty: 60 TABLET | Refills: 5 | Status: SHIPPED | OUTPATIENT
Start: 2024-07-03

## 2024-07-03 RX ORDER — FEXOFENADINE HCL 180 MG/1
180 TABLET ORAL DAILY
COMMUNITY

## 2024-07-03 RX ORDER — OMEPRAZOLE 20 MG/1
20 CAPSULE, DELAYED RELEASE ORAL
Qty: 90 CAPSULE | Refills: 1 | Status: SHIPPED | OUTPATIENT
Start: 2024-07-03

## 2024-07-03 ASSESSMENT — ENCOUNTER SYMPTOMS
ABDOMINAL PAIN: 0
CHEST TIGHTNESS: 0
CONSTIPATION: 0
SHORTNESS OF BREATH: 0
DIARRHEA: 0
BACK PAIN: 0

## 2024-07-03 NOTE — PROGRESS NOTES
Shreya Aleman is a 69 y.o. female who presents today for Blood Work (Repeat labs) and Headache (Per pt stated she has been having headaches only in the morning; ongoing for 2-3 weeks)  .      She has a history of   Patient Active Problem List   Diagnosis    Anxiety    Gastroesophageal reflux disease    Allergic rhinitis    Hyperlipidemia    Moderate late onset Alzheimer's dementia without behavioral disturbance, psychotic disturbance, mood disturbance, or anxiety ([facility])   .    Today patient is here for follow-up.     Anemia: Patient was noted to have a new anemia on blood work done in June.  Hemoglobin was down to 9.8 from a baseline of around 13-14.  Patient denies any signs or symptoms of bleeding.  On repeat blood tests iron levels were noted to be low.  Repeat hemoglobin was slightly improved at 10.3.  Last GI evaluation done in 2015, which was a colonoscopy.  Patient report no NSAID use.  Stress had been higher earlier this year due to confrontation with her daughter.    Headaches: Patient reports morning predominant headache status been going on for about 2 to 3 weeks.  Headaches seem to be localized to to her forehead.  Denies any blurry vision.  The seem to improve shortly after getting up and especially after coffee.    Repeat blood pressure much better.     Still seeing neurology for her memory loss and Alzheimer's.    ROS  Review of Systems   Constitutional:  Positive for fatigue (improving). Negative for fever.   HENT:  Negative for congestion and ear pain.    Respiratory:  Negative for chest tightness and shortness of breath.    Cardiovascular:  Negative for chest pain and leg swelling.   Gastrointestinal:  Negative for abdominal pain, constipation and diarrhea.   Endocrine: Negative for polydipsia.   Genitourinary:  Negative for difficulty urinating and dysuria.   Musculoskeletal:  Negative for arthralgias and back pain.   Skin:  Negative for rash.   Neurological:  Positive for headaches. Negative for

## 2024-07-14 DIAGNOSIS — G30.0 ALZHEIMER'S DISEASE WITH EARLY ONSET (CODE) (HCC): ICD-10-CM

## 2024-07-15 RX ORDER — DONEPEZIL HYDROCHLORIDE 10 MG/1
10 TABLET, FILM COATED ORAL NIGHTLY
Qty: 30 TABLET | Refills: 2 | Status: SHIPPED | OUTPATIENT
Start: 2024-07-15

## 2024-07-29 DIAGNOSIS — E78.5 HYPERLIPIDEMIA, UNSPECIFIED: ICD-10-CM

## 2024-07-29 RX ORDER — SIMVASTATIN 10 MG
TABLET ORAL
Qty: 90 TABLET | Refills: 0 | Status: SHIPPED | OUTPATIENT
Start: 2024-07-29

## 2024-08-15 ENCOUNTER — OFFICE VISIT (OUTPATIENT)
Age: 69
End: 2024-08-15
Payer: COMMERCIAL

## 2024-08-15 VITALS
WEIGHT: 159 LBS | BODY MASS INDEX: 27.14 KG/M2 | DIASTOLIC BLOOD PRESSURE: 83 MMHG | OXYGEN SATURATION: 97 % | TEMPERATURE: 98.1 F | HEIGHT: 64 IN | SYSTOLIC BLOOD PRESSURE: 150 MMHG | HEART RATE: 74 BPM | RESPIRATION RATE: 16 BRPM

## 2024-08-15 DIAGNOSIS — M54.2 NECK PAIN: ICD-10-CM

## 2024-08-15 DIAGNOSIS — D50.9 IRON DEFICIENCY ANEMIA, UNSPECIFIED IRON DEFICIENCY ANEMIA TYPE: ICD-10-CM

## 2024-08-15 DIAGNOSIS — F32.1 MODERATE MAJOR DEPRESSION (HCC): ICD-10-CM

## 2024-08-15 DIAGNOSIS — G30.1 MODERATE LATE ONSET ALZHEIMER'S DEMENTIA WITHOUT BEHAVIORAL DISTURBANCE, PSYCHOTIC DISTURBANCE, MOOD DISTURBANCE, OR ANXIETY (HCC): Primary | ICD-10-CM

## 2024-08-15 DIAGNOSIS — F41.9 ANXIETY: ICD-10-CM

## 2024-08-15 DIAGNOSIS — F02.B0 MODERATE LATE ONSET ALZHEIMER'S DEMENTIA WITHOUT BEHAVIORAL DISTURBANCE, PSYCHOTIC DISTURBANCE, MOOD DISTURBANCE, OR ANXIETY (HCC): Primary | ICD-10-CM

## 2024-08-15 LAB
BASOPHILS # BLD AUTO: 0 X10E3/UL (ref 0–0.2)
BASOPHILS NFR BLD AUTO: 0 %
EOSINOPHIL # BLD AUTO: 0.1 X10E3/UL (ref 0–0.4)
EOSINOPHIL NFR BLD AUTO: 1 %
ERYTHROCYTE [DISTWIDTH] IN BLOOD BY AUTOMATED COUNT: 20.1 % (ref 11.7–15.4)
FERRITIN SERPL-MCNC: 34 NG/ML (ref 15–150)
HCT VFR BLD AUTO: 36.9 % (ref 34–46.6)
HGB BLD-MCNC: 11.7 G/DL (ref 11.1–15.9)
IMM GRANULOCYTES # BLD AUTO: 0 X10E3/UL (ref 0–0.1)
IMM GRANULOCYTES NFR BLD AUTO: 0 %
IRON SATN MFR SERPL: 25 % (ref 15–55)
IRON SERPL-MCNC: 89 UG/DL (ref 27–139)
LYMPHOCYTES # BLD AUTO: 1.5 X10E3/UL (ref 0.7–3.1)
LYMPHOCYTES NFR BLD AUTO: 21 %
MCH RBC QN AUTO: 26.7 PG (ref 26.6–33)
MCHC RBC AUTO-ENTMCNC: 31.7 G/DL (ref 31.5–35.7)
MCV RBC AUTO: 84 FL (ref 79–97)
MONOCYTES # BLD AUTO: 0.8 X10E3/UL (ref 0.1–0.9)
MONOCYTES NFR BLD AUTO: 11 %
NEUTROPHILS # BLD AUTO: 4.8 X10E3/UL (ref 1.4–7)
NEUTROPHILS NFR BLD AUTO: 67 %
PLATELET # BLD AUTO: 261 X10E3/UL (ref 150–450)
RBC # BLD AUTO: 4.38 X10E6/UL (ref 3.77–5.28)
TIBC SERPL-MCNC: 353 UG/DL (ref 250–450)
UIBC SERPL-MCNC: 264 UG/DL (ref 118–369)
WBC # BLD AUTO: 7.2 X10E3/UL (ref 3.4–10.8)

## 2024-08-15 PROCEDURE — 1123F ACP DISCUSS/DSCN MKR DOCD: CPT | Performed by: INTERNAL MEDICINE

## 2024-08-15 PROCEDURE — 99214 OFFICE O/P EST MOD 30 MIN: CPT | Performed by: INTERNAL MEDICINE

## 2024-08-15 RX ORDER — METHYLPREDNISOLONE 4 MG/1
TABLET ORAL
Qty: 1 KIT | Refills: 0 | Status: SHIPPED | OUTPATIENT
Start: 2024-08-15 | End: 2024-08-21

## 2024-08-15 RX ORDER — ALPRAZOLAM 0.5 MG/1
0.5 TABLET ORAL DAILY PRN
Qty: 30 TABLET | Refills: 0 | Status: SHIPPED | OUTPATIENT
Start: 2024-08-15 | End: 2024-09-14

## 2024-08-15 RX ORDER — CYCLOBENZAPRINE HCL 5 MG
5 TABLET ORAL 2 TIMES DAILY PRN
Qty: 10 TABLET | Refills: 0 | Status: SHIPPED | OUTPATIENT
Start: 2024-08-15 | End: 2024-08-25

## 2024-08-15 ASSESSMENT — ENCOUNTER SYMPTOMS
DIARRHEA: 0
CONSTIPATION: 0
CHEST TIGHTNESS: 0
BACK PAIN: 0
SHORTNESS OF BREATH: 0
ABDOMINAL PAIN: 0

## 2024-08-15 NOTE — PROGRESS NOTES
Shreya Aleman is a 69 y.o. female who presents today for Neck Pain (Aches and pains in neck; worsen in the morning; ongoing for about a week)  .      She has a history of   Patient Active Problem List   Diagnosis    Anxiety    Gastroesophageal reflux disease    Allergic rhinitis    Hyperlipidemia    Moderate late onset Alzheimer's dementia without behavioral disturbance, psychotic disturbance, mood disturbance, or anxiety ([facility])   .    Today patient is here for an acute visit..     Anemia: Patient last visit with to have anemia.  When she finally did follow-up with us, stool testing was negative for blood, but this was a month after the initial lab draw.  Blood work done yesterday does show improvements in iron stores as well as CBC.  Tolerating p.o. iron    Emotions is a bit worse. PRN alprazolam. Rare use. Last fill in May.  She reports that mood has been a bit worse since they have been going through a lot of materials at home and this is working back a lot of memories.  We discussed my concern over benzo use given her underlying dementia.  We discussed low threshold to increase her sertraline dose if mood remains low.  Continues to follow with neurology for her ongoing dementia.  Remains on Aricept.  Home is safe.    Musculoskeletal pain:  She wishes to address discomfort in the Right neck pain at today's visit. This has been moderate in nature, gradual, starting about 1 week ago in onset. Pain with movement but no  fever, rash, joint swelling, injury has been noted by the patient.  Self treatment: Heating pad .  she does not have a history of osteoarthritis, rheumatoid, gout, Crohn's disease, Lyme's disease joint disease.      ROS  Review of Systems   Constitutional:  Negative for fatigue and fever.   HENT:  Negative for congestion and ear pain.    Respiratory:  Negative for chest tightness and shortness of breath.    Cardiovascular:  Negative for chest pain and leg swelling.   Gastrointestinal:  Negative for

## 2024-08-22 ENCOUNTER — OFFICE VISIT (OUTPATIENT)
Age: 69
End: 2024-08-22
Payer: COMMERCIAL

## 2024-08-22 VITALS
DIASTOLIC BLOOD PRESSURE: 82 MMHG | WEIGHT: 156 LBS | SYSTOLIC BLOOD PRESSURE: 150 MMHG | BODY MASS INDEX: 26.63 KG/M2 | OXYGEN SATURATION: 99 % | HEIGHT: 64 IN | RESPIRATION RATE: 16 BRPM | HEART RATE: 72 BPM

## 2024-08-22 DIAGNOSIS — G30.0 ALZHEIMER'S DISEASE WITH EARLY ONSET (CODE) (HCC): Primary | ICD-10-CM

## 2024-08-22 PROCEDURE — 99214 OFFICE O/P EST MOD 30 MIN: CPT

## 2024-08-22 PROCEDURE — 1123F ACP DISCUSS/DSCN MKR DOCD: CPT

## 2024-08-22 RX ORDER — MEMANTINE HYDROCHLORIDE 5 MG/1
TABLET ORAL
Qty: 70 TABLET | Refills: 0 | Status: SHIPPED | OUTPATIENT
Start: 2024-08-22

## 2024-08-22 RX ORDER — MEMANTINE HYDROCHLORIDE 10 MG/1
10 TABLET ORAL 2 TIMES DAILY
Qty: 180 TABLET | Refills: 1 | Status: SHIPPED | OUTPATIENT
Start: 2024-09-12

## 2024-08-22 NOTE — PROGRESS NOTES
Shreya Aleman is a 69 y.o. female who presents with the following  Chief Complaint   Patient presents with    Memory Loss     Per patient caregiver memory has gotten slightly worse.       HPI  Patient is here today with her sister for Alzheimer's follow-up.  Patient was last seen February 4, 2023 by Paul Carias NP at which time the patient's memory was said to have been stable with some ups and downs but was doing overall well.  She was established on Aricept and Strattera at the time.  She was on Seroquel helping with sleep, was not having any hallucinations or delusions and had a BuSpar 5 mg twice a day with some breakthrough anxiety attacks.  Paul had the patient continue Aricept 10 mg daily and Strattera 25 mg daily but increase the BuSpar to 10 mg twice a day for the anxiety.    Today the patient and her sister feel like memory is slightly worse than it was when she was here the last time.  The patient's has been under a lot of stress at home with her daughter and with a brother that has passed away this year.  The sister feels like her mood is a little more depressed and that she is a little emotional.  The patient does take sertraline 100 mg daily by PCP.  They do both feel like the increase in BuSpar to 10 mg twice a day has helped with the anxiety attacks.  The patient sister feels like she has better conversations and that she is eating and sleeping well.  The patient does live with her sister.  The patient's sister does endorse sundowning with more confusion in the evening.  Questionable times where she has had visual hallucinations as well but this is not often.  The patient sister says once she said that there was somebody in the [place] their home.  Today the patient could tell me her date of birth, where she is.  She could not tell me the floor that we are on and did not remember coming up the elevator, the day of the week, the month, the year, the current season, who the president

## 2024-08-22 NOTE — PROGRESS NOTES
Chief Complaint   Patient presents with    Memory Loss     Per patient caregiver memory has gotten slightly worse.     Vitals:    08/22/24 1313   BP: (!) 150/82   Pulse: 72   Resp: 16   SpO2: 99%

## 2024-09-16 DIAGNOSIS — G30.0 ALZHEIMER'S DISEASE WITH EARLY ONSET (CODE) (HCC): ICD-10-CM

## 2024-10-08 DIAGNOSIS — G30.0 ALZHEIMER'S DISEASE WITH EARLY ONSET (CODE) (HCC): Primary | ICD-10-CM

## 2024-10-08 RX ORDER — QUETIAPINE FUMARATE 50 MG/1
50 TABLET, FILM COATED ORAL NIGHTLY
Qty: 30 TABLET | Refills: 5 | Status: SHIPPED | OUTPATIENT
Start: 2024-10-08

## 2024-10-09 ENCOUNTER — APPOINTMENT (OUTPATIENT)
Age: 69
End: 2024-10-09
Payer: MEDICARE

## 2024-10-09 ENCOUNTER — OFFICE VISIT (OUTPATIENT)
Age: 69
End: 2024-10-09
Payer: MEDICARE

## 2024-10-09 VITALS
WEIGHT: 160 LBS | HEIGHT: 64 IN | TEMPERATURE: 98.9 F | DIASTOLIC BLOOD PRESSURE: 74 MMHG | BODY MASS INDEX: 27.31 KG/M2 | OXYGEN SATURATION: 95 % | HEART RATE: 88 BPM | SYSTOLIC BLOOD PRESSURE: 128 MMHG | RESPIRATION RATE: 16 BRPM

## 2024-10-09 DIAGNOSIS — J01.90 ACUTE NON-RECURRENT SINUSITIS, UNSPECIFIED LOCATION: Primary | ICD-10-CM

## 2024-10-09 PROCEDURE — 1123F ACP DISCUSS/DSCN MKR DOCD: CPT | Performed by: NURSE PRACTITIONER

## 2024-10-09 PROCEDURE — G8484 FLU IMMUNIZE NO ADMIN: HCPCS | Performed by: NURSE PRACTITIONER

## 2024-10-09 PROCEDURE — 1036F TOBACCO NON-USER: CPT | Performed by: NURSE PRACTITIONER

## 2024-10-09 PROCEDURE — G8419 CALC BMI OUT NRM PARAM NOF/U: HCPCS | Performed by: NURSE PRACTITIONER

## 2024-10-09 PROCEDURE — G8427 DOCREV CUR MEDS BY ELIG CLIN: HCPCS | Performed by: NURSE PRACTITIONER

## 2024-10-09 PROCEDURE — 1090F PRES/ABSN URINE INCON ASSESS: CPT | Performed by: NURSE PRACTITIONER

## 2024-10-09 PROCEDURE — 99213 OFFICE O/P EST LOW 20 MIN: CPT | Performed by: NURSE PRACTITIONER

## 2024-10-09 PROCEDURE — 3017F COLORECTAL CA SCREEN DOC REV: CPT | Performed by: NURSE PRACTITIONER

## 2024-10-09 PROCEDURE — G8399 PT W/DXA RESULTS DOCUMENT: HCPCS | Performed by: NURSE PRACTITIONER

## 2024-10-09 RX ORDER — BENZONATATE 100 MG/1
100-200 CAPSULE ORAL 3 TIMES DAILY PRN
Qty: 21 CAPSULE | Refills: 0 | Status: SHIPPED | OUTPATIENT
Start: 2024-10-09 | End: 2024-10-16

## 2024-10-09 RX ORDER — CETIRIZINE HYDROCHLORIDE 10 MG/1
10 TABLET ORAL DAILY
Qty: 30 TABLET | Refills: 0 | Status: SHIPPED | OUTPATIENT
Start: 2024-10-09 | End: 2024-11-08

## 2024-10-09 RX ORDER — AZITHROMYCIN 250 MG/1
250 TABLET, FILM COATED ORAL SEE ADMIN INSTRUCTIONS
Qty: 6 TABLET | Refills: 0 | Status: SHIPPED | OUTPATIENT
Start: 2024-10-09 | End: 2024-10-14

## 2024-10-09 ASSESSMENT — ENCOUNTER SYMPTOMS
BLOOD IN STOOL: 0
DIARRHEA: 0
ABDOMINAL PAIN: 0
NAUSEA: 0
VOMITING: 0
EYES NEGATIVE: 1
COUGH: 1
SINUS PRESSURE: 0
SINUS PAIN: 0
CONSTIPATION: 0
BACK PAIN: 0
EYE PAIN: 0
RHINORRHEA: 1
CHEST TIGHTNESS: 0
EYE REDNESS: 0
GASTROINTESTINAL NEGATIVE: 1
SHORTNESS OF BREATH: 0

## 2024-10-09 NOTE — PROGRESS NOTES
tenderness.   Musculoskeletal:         General: Normal range of motion.      Cervical back: No tenderness.      Right lower leg: No edema.      Left lower leg: No edema.   Lymphadenopathy:      Cervical: No cervical adenopathy.   Skin:     General: Skin is warm and dry.   Neurological:      Mental Status: She is alert.   Psychiatric:         Mood and Affect: Mood normal.         Behavior: Behavior normal.

## 2024-10-10 DIAGNOSIS — Z86.59 HISTORY OF ANXIETY: ICD-10-CM

## 2024-10-11 RX ORDER — BUSPIRONE HYDROCHLORIDE 10 MG/1
TABLET ORAL
Qty: 60 TABLET | Refills: 5 | Status: SHIPPED | OUTPATIENT
Start: 2024-10-11

## 2024-10-22 DIAGNOSIS — G30.0 ALZHEIMER'S DISEASE WITH EARLY ONSET (CODE) (HCC): ICD-10-CM

## 2024-10-22 RX ORDER — DONEPEZIL HYDROCHLORIDE 10 MG/1
10 TABLET, FILM COATED ORAL NIGHTLY
Qty: 30 TABLET | Refills: 2 | Status: SHIPPED | OUTPATIENT
Start: 2024-10-22

## 2024-11-06 DIAGNOSIS — J01.90 ACUTE NON-RECURRENT SINUSITIS, UNSPECIFIED LOCATION: ICD-10-CM

## 2024-11-06 RX ORDER — CETIRIZINE HYDROCHLORIDE 10 MG/1
10 TABLET ORAL DAILY
Qty: 30 TABLET | Refills: 0 | Status: SHIPPED | OUTPATIENT
Start: 2024-11-06

## 2024-12-08 DIAGNOSIS — E78.5 HYPERLIPIDEMIA, UNSPECIFIED: ICD-10-CM

## 2024-12-08 RX ORDER — ATOMOXETINE 25 MG/1
CAPSULE ORAL DAILY
Qty: 90 CAPSULE | Refills: 1 | Status: SHIPPED | OUTPATIENT
Start: 2024-12-08

## 2024-12-08 RX ORDER — SIMVASTATIN 10 MG
TABLET ORAL
Qty: 90 TABLET | Refills: 0 | Status: SHIPPED | OUTPATIENT
Start: 2024-12-08

## 2024-12-12 DIAGNOSIS — F41.9 ANXIETY: ICD-10-CM

## 2024-12-13 RX ORDER — ALPRAZOLAM 0.5 MG
0.5 TABLET ORAL DAILY PRN
Qty: 30 TABLET | Refills: 0 | Status: SHIPPED | OUTPATIENT
Start: 2024-12-13 | End: 2025-01-12

## 2024-12-26 ENCOUNTER — OFFICE VISIT (OUTPATIENT)
Facility: CLINIC | Age: 69
End: 2024-12-26
Payer: MEDICARE

## 2024-12-26 VITALS
DIASTOLIC BLOOD PRESSURE: 80 MMHG | BODY MASS INDEX: 27.31 KG/M2 | HEART RATE: 79 BPM | TEMPERATURE: 97.9 F | WEIGHT: 160 LBS | OXYGEN SATURATION: 95 % | HEIGHT: 64 IN | SYSTOLIC BLOOD PRESSURE: 150 MMHG | RESPIRATION RATE: 16 BRPM

## 2024-12-26 DIAGNOSIS — D50.9 IRON DEFICIENCY ANEMIA, UNSPECIFIED IRON DEFICIENCY ANEMIA TYPE: ICD-10-CM

## 2024-12-26 DIAGNOSIS — F41.9 ANXIETY: ICD-10-CM

## 2024-12-26 DIAGNOSIS — G30.1 MODERATE LATE ONSET ALZHEIMER'S DEMENTIA WITHOUT BEHAVIORAL DISTURBANCE, PSYCHOTIC DISTURBANCE, MOOD DISTURBANCE, OR ANXIETY (HCC): ICD-10-CM

## 2024-12-26 DIAGNOSIS — E78.5 HYPERLIPIDEMIA, UNSPECIFIED HYPERLIPIDEMIA TYPE: ICD-10-CM

## 2024-12-26 DIAGNOSIS — M25.569 KNEE PAIN, UNSPECIFIED CHRONICITY, UNSPECIFIED LATERALITY: ICD-10-CM

## 2024-12-26 DIAGNOSIS — R03.0 ELEVATED BP WITHOUT DIAGNOSIS OF HYPERTENSION: ICD-10-CM

## 2024-12-26 DIAGNOSIS — F02.B0 MODERATE LATE ONSET ALZHEIMER'S DEMENTIA WITHOUT BEHAVIORAL DISTURBANCE, PSYCHOTIC DISTURBANCE, MOOD DISTURBANCE, OR ANXIETY (HCC): ICD-10-CM

## 2024-12-26 DIAGNOSIS — E78.5 HYPERLIPIDEMIA, UNSPECIFIED HYPERLIPIDEMIA TYPE: Primary | ICD-10-CM

## 2024-12-26 DIAGNOSIS — F32.1 MODERATE MAJOR DEPRESSION (HCC): ICD-10-CM

## 2024-12-26 PROCEDURE — G8419 CALC BMI OUT NRM PARAM NOF/U: HCPCS | Performed by: INTERNAL MEDICINE

## 2024-12-26 PROCEDURE — G8484 FLU IMMUNIZE NO ADMIN: HCPCS | Performed by: INTERNAL MEDICINE

## 2024-12-26 PROCEDURE — 1126F AMNT PAIN NOTED NONE PRSNT: CPT | Performed by: INTERNAL MEDICINE

## 2024-12-26 PROCEDURE — G8399 PT W/DXA RESULTS DOCUMENT: HCPCS | Performed by: INTERNAL MEDICINE

## 2024-12-26 PROCEDURE — G8428 CUR MEDS NOT DOCUMENT: HCPCS | Performed by: INTERNAL MEDICINE

## 2024-12-26 PROCEDURE — 1123F ACP DISCUSS/DSCN MKR DOCD: CPT | Performed by: INTERNAL MEDICINE

## 2024-12-26 PROCEDURE — 3017F COLORECTAL CA SCREEN DOC REV: CPT | Performed by: INTERNAL MEDICINE

## 2024-12-26 PROCEDURE — 1090F PRES/ABSN URINE INCON ASSESS: CPT | Performed by: INTERNAL MEDICINE

## 2024-12-26 PROCEDURE — 99214 OFFICE O/P EST MOD 30 MIN: CPT | Performed by: INTERNAL MEDICINE

## 2024-12-26 PROCEDURE — 1036F TOBACCO NON-USER: CPT | Performed by: INTERNAL MEDICINE

## 2024-12-26 ASSESSMENT — ENCOUNTER SYMPTOMS
SHORTNESS OF BREATH: 0
CHEST TIGHTNESS: 0
CONSTIPATION: 0
DIARRHEA: 0
BACK PAIN: 0
ABDOMINAL PAIN: 0

## 2024-12-26 NOTE — PROGRESS NOTES
manageable and ensures that no one else has access to her Xanax. She was a nurse for a long time. She tries to avoid stressors at home.    MEDICATIONS  Current: Xanax    ROS  Review of Systems   Constitutional:  Negative for fatigue and fever.   HENT:  Negative for congestion and ear pain.    Respiratory:  Negative for chest tightness and shortness of breath.    Cardiovascular:  Negative for chest pain and leg swelling.   Gastrointestinal:  Negative for abdominal pain, constipation and diarrhea.   Endocrine: Negative for polydipsia.   Genitourinary:  Negative for difficulty urinating and dysuria.   Musculoskeletal:  Positive for arthralgias. Negative for back pain.   Skin:  Negative for rash.   Neurological:  Negative for dizziness and headaches.   Psychiatric/Behavioral:  Positive for confusion. Negative for agitation, self-injury, sleep disturbance and suicidal ideas. The patient is not nervous/anxious.          Vitals:    12/26/24 1505   BP: (!) 172/90   Pulse: 79   Resp: 16   Temp: 97.9 °F (36.6 °C)   SpO2: 95%       Physical Exam  Constitutional:       Appearance: Normal appearance.   HENT:      Head: Normocephalic.      Right Ear: Tympanic membrane normal.      Left Ear: Tympanic membrane normal.   Cardiovascular:      Rate and Rhythm: Normal rate and regular rhythm.      Heart sounds: No murmur heard.  Pulmonary:      Effort: Pulmonary effort is normal.      Breath sounds: Normal breath sounds. No wheezing.   Abdominal:      General: There is no distension.      Palpations: Abdomen is soft.   Musculoskeletal:         General: No swelling.      Comments: That is post left total knee arthroplasty no warmth or swelling.  Normal exam of right knee   Skin:     General: Skin is warm and dry.   Neurological:      General: No focal deficit present.      Mental Status: She is alert.   Psychiatric:         Mood and Affect: Mood normal.         Behavior: Behavior normal.           Current Outpatient Medications

## 2024-12-27 LAB
ALBUMIN SERPL-MCNC: 4.3 G/DL (ref 3.5–5)
ALBUMIN/GLOB SERPL: 1.4 (ref 1.1–2.2)
ALP SERPL-CCNC: 95 U/L (ref 45–117)
ALT SERPL-CCNC: 38 U/L (ref 12–78)
ANION GAP SERPL CALC-SCNC: 6 MMOL/L (ref 2–12)
AST SERPL-CCNC: 48 U/L (ref 15–37)
BASOPHILS # BLD: 0 K/UL (ref 0–0.1)
BASOPHILS NFR BLD: 0 % (ref 0–1)
BILIRUB SERPL-MCNC: 0.4 MG/DL (ref 0.2–1)
BUN SERPL-MCNC: 16 MG/DL (ref 6–20)
BUN/CREAT SERPL: 18 (ref 12–20)
CALCIUM SERPL-MCNC: 9.5 MG/DL (ref 8.5–10.1)
CHLORIDE SERPL-SCNC: 109 MMOL/L (ref 97–108)
CHOLEST SERPL-MCNC: 192 MG/DL
CO2 SERPL-SCNC: 27 MMOL/L (ref 21–32)
CREAT SERPL-MCNC: 0.89 MG/DL (ref 0.55–1.02)
CRP SERPL-MCNC: <0.29 MG/DL (ref 0–0.3)
DIFFERENTIAL METHOD BLD: ABNORMAL
EOSINOPHIL # BLD: 0.1 K/UL (ref 0–0.4)
EOSINOPHIL NFR BLD: 1 % (ref 0–7)
ERYTHROCYTE [DISTWIDTH] IN BLOOD BY AUTOMATED COUNT: 14.6 % (ref 11.5–14.5)
ERYTHROCYTE [SEDIMENTATION RATE] IN BLOOD: 9 MM/HR (ref 0–30)
FERRITIN SERPL-MCNC: 37 NG/ML (ref 26–388)
GLOBULIN SER CALC-MCNC: 3 G/DL (ref 2–4)
GLUCOSE SERPL-MCNC: 98 MG/DL (ref 65–100)
HCT VFR BLD AUTO: 42.4 % (ref 35–47)
HDLC SERPL-MCNC: 55 MG/DL
HDLC SERPL: 3.5 (ref 0–5)
HGB BLD-MCNC: 13.4 G/DL (ref 11.5–16)
IMM GRANULOCYTES # BLD AUTO: 0 K/UL (ref 0–0.04)
IMM GRANULOCYTES NFR BLD AUTO: 0 % (ref 0–0.5)
IRON SATN MFR SERPL: 25 % (ref 20–50)
IRON SERPL-MCNC: 93 UG/DL (ref 35–150)
LDLC SERPL CALC-MCNC: 97.6 MG/DL (ref 0–100)
LYMPHOCYTES # BLD: 1.9 K/UL (ref 0.8–3.5)
LYMPHOCYTES NFR BLD: 24 % (ref 12–49)
MCH RBC QN AUTO: 30.1 PG (ref 26–34)
MCHC RBC AUTO-ENTMCNC: 31.6 G/DL (ref 30–36.5)
MCV RBC AUTO: 95.3 FL (ref 80–99)
MONOCYTES # BLD: 0.7 K/UL (ref 0–1)
MONOCYTES NFR BLD: 9 % (ref 5–13)
NEUTS SEG # BLD: 5.2 K/UL (ref 1.8–8)
NEUTS SEG NFR BLD: 66 % (ref 32–75)
NRBC # BLD: 0 K/UL (ref 0–0.01)
NRBC BLD-RTO: 0 PER 100 WBC
PLATELET # BLD AUTO: 287 K/UL (ref 150–400)
PMV BLD AUTO: 10.5 FL (ref 8.9–12.9)
POTASSIUM SERPL-SCNC: 4 MMOL/L (ref 3.5–5.1)
PROT SERPL-MCNC: 7.3 G/DL (ref 6.4–8.2)
RBC # BLD AUTO: 4.45 M/UL (ref 3.8–5.2)
SODIUM SERPL-SCNC: 142 MMOL/L (ref 136–145)
TIBC SERPL-MCNC: 367 UG/DL (ref 250–450)
TRIGL SERPL-MCNC: 197 MG/DL
VLDLC SERPL CALC-MCNC: 39.4 MG/DL
WBC # BLD AUTO: 7.9 K/UL (ref 3.6–11)

## 2024-12-31 RX ORDER — MEMANTINE HYDROCHLORIDE 5 MG/1
TABLET ORAL
Qty: 70 TABLET | Refills: 0 | OUTPATIENT
Start: 2024-12-31

## 2025-01-05 DIAGNOSIS — D50.9 IRON DEFICIENCY ANEMIA, UNSPECIFIED IRON DEFICIENCY ANEMIA TYPE: ICD-10-CM

## 2025-01-08 DIAGNOSIS — G30.0 ALZHEIMER'S DISEASE WITH EARLY ONSET (CODE) (HCC): ICD-10-CM

## 2025-01-08 RX ORDER — DONEPEZIL HYDROCHLORIDE 10 MG/1
10 TABLET, FILM COATED ORAL NIGHTLY
Qty: 30 TABLET | Refills: 2 | Status: SHIPPED | OUTPATIENT
Start: 2025-01-08

## 2025-01-10 ENCOUNTER — TELEPHONE (OUTPATIENT)
Facility: CLINIC | Age: 70
End: 2025-01-10

## 2025-01-10 NOTE — TELEPHONE ENCOUNTER
Bebo called from [facility] to inform the provider that the patient use to have [employer] but now she have [employer]. They do not except her insurance. He stated that he reach out to the A mailbox was full.

## 2025-01-17 DIAGNOSIS — D50.9 IRON DEFICIENCY ANEMIA, UNSPECIFIED IRON DEFICIENCY ANEMIA TYPE: ICD-10-CM

## 2025-01-17 RX ORDER — FERROUS SULFATE 325(65) MG
1 TABLET ORAL 2 TIMES DAILY
Qty: 60 TABLET | Refills: 3 | Status: SHIPPED | OUTPATIENT
Start: 2025-01-17

## 2025-01-23 ENCOUNTER — TELEPHONE (OUTPATIENT)
Age: 70
End: 2025-01-23

## 2025-01-27 DIAGNOSIS — E78.5 HYPERLIPIDEMIA, UNSPECIFIED: ICD-10-CM

## 2025-01-27 DIAGNOSIS — G30.0 ALZHEIMER'S DISEASE WITH EARLY ONSET (CODE) (HCC): ICD-10-CM

## 2025-01-27 RX ORDER — SIMVASTATIN 10 MG
TABLET ORAL
Qty: 90 TABLET | Refills: 2 | Status: SHIPPED | OUTPATIENT
Start: 2025-01-27

## 2025-01-29 RX ORDER — MEMANTINE HYDROCHLORIDE 10 MG/1
10 TABLET ORAL 2 TIMES DAILY
Qty: 180 TABLET | Refills: 1 | Status: SHIPPED | OUTPATIENT
Start: 2025-01-29

## 2025-01-29 RX ORDER — MEMANTINE HYDROCHLORIDE 5 MG/1
TABLET ORAL
Qty: 70 TABLET | Refills: 0 | OUTPATIENT
Start: 2025-01-29

## 2025-02-05 ENCOUNTER — HOSPITAL ENCOUNTER (OUTPATIENT)
Facility: HOSPITAL | Age: 70
Discharge: HOME | End: 2025-02-08
Payer: MEDICARE

## 2025-02-05 ENCOUNTER — TELEPHONE (OUTPATIENT)
Facility: CLINIC | Age: 70
End: 2025-02-05

## 2025-02-05 ENCOUNTER — OFFICE VISIT (OUTPATIENT)
Facility: CLINIC | Age: 70
End: 2025-02-05
Payer: MEDICARE

## 2025-02-05 VITALS
RESPIRATION RATE: 16 BRPM | OXYGEN SATURATION: 96 % | BODY MASS INDEX: 29.64 KG/M2 | SYSTOLIC BLOOD PRESSURE: 138 MMHG | WEIGHT: 173.6 LBS | HEART RATE: 81 BPM | TEMPERATURE: 98.2 F | HEIGHT: 64 IN | DIASTOLIC BLOOD PRESSURE: 84 MMHG

## 2025-02-05 DIAGNOSIS — M54.2 CERVICAL PAIN (NECK): ICD-10-CM

## 2025-02-05 DIAGNOSIS — M17.12 PRIMARY OSTEOARTHRITIS OF LEFT KNEE: Primary | ICD-10-CM

## 2025-02-05 PROBLEM — F32.1 MODERATE MAJOR DEPRESSION (HCC): Status: ACTIVE | Noted: 2025-02-05

## 2025-02-05 PROCEDURE — 99213 OFFICE O/P EST LOW 20 MIN: CPT | Performed by: NURSE PRACTITIONER

## 2025-02-05 PROCEDURE — 1159F MED LIST DOCD IN RCRD: CPT | Performed by: NURSE PRACTITIONER

## 2025-02-05 PROCEDURE — 1125F AMNT PAIN NOTED PAIN PRSNT: CPT | Performed by: NURSE PRACTITIONER

## 2025-02-05 PROCEDURE — 72050 X-RAY EXAM NECK SPINE 4/5VWS: CPT

## 2025-02-05 PROCEDURE — 1123F ACP DISCUSS/DSCN MKR DOCD: CPT | Performed by: NURSE PRACTITIONER

## 2025-02-05 PROCEDURE — 1160F RVW MEDS BY RX/DR IN RCRD: CPT | Performed by: NURSE PRACTITIONER

## 2025-02-05 RX ORDER — METHYLPREDNISOLONE 4 MG/1
TABLET ORAL
Qty: 1 KIT | Refills: 0 | Status: SHIPPED | OUTPATIENT
Start: 2025-02-05

## 2025-02-05 ASSESSMENT — ENCOUNTER SYMPTOMS
SINUS PRESSURE: 0
CHEST TIGHTNESS: 0
ABDOMINAL PAIN: 0
GASTROINTESTINAL NEGATIVE: 1
NAUSEA: 0
RHINORRHEA: 0
BACK PAIN: 0
COUGH: 0
VOMITING: 0
EYE PAIN: 0
SINUS PAIN: 0
BLOOD IN STOOL: 0
EYE REDNESS: 0
DIARRHEA: 0
EYES NEGATIVE: 1
RESPIRATORY NEGATIVE: 1
CONSTIPATION: 0
SHORTNESS OF BREATH: 0

## 2025-02-05 NOTE — PROGRESS NOTES
Assessment and Plan     1. Primary osteoarthritis of left knee: [facility] provider notes and bilateral knee x-ray report reviwed and discussed with patient. Recommended to start treatment as recommended by Ortho. Rx for Medrol pack given, mode of use discussed. Referred to PT. Will follow up ortho if symptoms do not improve with treatment. Return instructions given. Pt verbalized understanding   -     [facility] - Physical Therapy at [facility]  -     methylPREDNISolone (MEDROL DOSEPACK) 4 MG tablet; Follow-up package instructions, Disp-1 kit, R-0Normal  2. Cervical pain (neck): Imaging studies ordered. Referred to PT and pain management. Can take up to 3 grams of tylenol daily. Ice/warm compresses, stretching exercises recommended.   -     XR CERVICAL SPINE (4 OR 5 VIEWS); Future  -     [facility] - Physical Therapy at [facility]  -     methylPREDNISolone (MEDROL DOSEPACK) 4 MG tablet; Follow-up package instructions, Disp-1 kit, R-0Normal       Benefits, risks, possible drug interactions, and side effects of all new medications were reviewed with the patient. Pt verbalized understanding.    An electronic signature was used to authenticate this note.  Karrie Birch, APRN - CNP  2/5/2025      Follow-up and Dispositions    Return if symptoms worsen or fail to improve.          History of Present Illness   Chief Complaint     Shreya Aleman is a 69 y.o. female here for had concerns including Neck Pain (Right sided neck pain for about 2 weeks. Patient usually sleeps on the right side) and Knee Pain.   Mrs. Aleman presents today accompanied by sister with reports of bilateral knee pain, worsen to R knee for over 3 months. Pt has been followed up by orthopedics who had bilateral knee x-ray done 11/2024, moderate OA of R knee and prosthesis of L knee in good placement. Pt had total L knee replacement in 2023. Pt was recommended to take Medrol pack and do PT to manage knee pain but pt did not start

## 2025-02-05 NOTE — TELEPHONE ENCOUNTER
The patient is requesting a call back to discuss an additional order for imaging. She will like a order for her right knee    [phone]

## 2025-02-25 DIAGNOSIS — M50.30 DEGENERATIVE DISC DISEASE, CERVICAL: Primary | ICD-10-CM

## 2025-02-28 ENCOUNTER — TELEPHONE (OUTPATIENT)
Age: 70
End: 2025-02-28

## 2025-02-28 RX ORDER — CYCLOBENZAPRINE HCL 5 MG
5 TABLET ORAL 2 TIMES DAILY PRN
Qty: 10 TABLET | Refills: 0 | Status: SHIPPED | OUTPATIENT
Start: 2025-02-28 | End: 2025-03-10

## 2025-02-28 NOTE — TELEPHONE ENCOUNTER
She's requesting a call to discuss getting a sooner appt .    She stated the patient having some new medical issues that's she's concern about.

## 2025-03-26 NOTE — PROGRESS NOTES
Shreya Aleman is a 69 y.o. female who presents with the following  Chief Complaint   Patient presents with    Follow-up     Dementia      Last office visit note  HPI  Patient is here today with her sister for Alzheimer's follow-up.  Patient was last seen February 4, 2023 by Paul Carias NP at which time the patient's memory was said to have been stable with some ups and downs but was doing overall well.  She was established on Aricept and Strattera at the time.  She was on Seroquel helping with sleep, was not having any hallucinations or delusions and had a BuSpar 5 mg twice a day with some breakthrough anxiety attacks.  Paul had the patient continue Aricept 10 mg daily and Strattera 25 mg daily but increase the BuSpar to 10 mg twice a day for the anxiety.     Today the patient and her sister feel like memory is slightly worse than it was when she was here the last time.  The patient's has been under a lot of stress at home with her daughter and with a brother that has passed away this year.  The sister feels like her mood is a little more depressed and that she is a little emotional.  The patient does take sertraline 100 mg daily by PCP.  They do both feel like the increase in BuSpar to 10 mg twice a day has helped with the anxiety attacks.  The patient sister feels like she has better conversations and that she is eating and sleeping well.  The patient does live with her sister.  The patient's sister does endorse sundowning with more confusion in the evening.  Questionable times where she has had visual hallucinations as well but this is not often.  The patient sister says once she said that there was somebody in the [place] their home.  Today the patient could tell me her date of birth, where she is.  She could not tell me the floor that we are on and did not remember coming up the elevator, the day of the week, the month, the year, the current season, who the president is.    We will go ahead and start the

## 2025-03-27 ENCOUNTER — OFFICE VISIT (OUTPATIENT)
Age: 70
End: 2025-03-27

## 2025-03-27 VITALS
HEART RATE: 84 BPM | OXYGEN SATURATION: 97 % | RESPIRATION RATE: 20 BRPM | DIASTOLIC BLOOD PRESSURE: 98 MMHG | SYSTOLIC BLOOD PRESSURE: 170 MMHG

## 2025-03-27 DIAGNOSIS — Z86.59 HISTORY OF ANXIETY: ICD-10-CM

## 2025-03-27 DIAGNOSIS — G30.1 MODERATE LATE ONSET ALZHEIMER'S DEMENTIA WITHOUT BEHAVIORAL DISTURBANCE, PSYCHOTIC DISTURBANCE, MOOD DISTURBANCE, OR ANXIETY (HCC): ICD-10-CM

## 2025-03-27 DIAGNOSIS — G30.0 ALZHEIMER'S DISEASE WITH EARLY ONSET (CODE): ICD-10-CM

## 2025-03-27 DIAGNOSIS — F02.B0 MODERATE LATE ONSET ALZHEIMER'S DEMENTIA WITHOUT BEHAVIORAL DISTURBANCE, PSYCHOTIC DISTURBANCE, MOOD DISTURBANCE, OR ANXIETY (HCC): ICD-10-CM

## 2025-03-27 RX ORDER — DONEPEZIL HYDROCHLORIDE 10 MG/1
10 TABLET, FILM COATED ORAL NIGHTLY
Qty: 90 TABLET | Refills: 3 | Status: SHIPPED | OUTPATIENT
Start: 2025-03-27

## 2025-03-27 RX ORDER — BUSPIRONE HYDROCHLORIDE 10 MG/1
TABLET ORAL
Qty: 60 TABLET | Refills: 5 | Status: SHIPPED | OUTPATIENT
Start: 2025-03-27

## 2025-04-27 DIAGNOSIS — D50.9 IRON DEFICIENCY ANEMIA, UNSPECIFIED IRON DEFICIENCY ANEMIA TYPE: ICD-10-CM

## 2025-04-28 RX ORDER — OMEPRAZOLE 20 MG/1
20 CAPSULE, DELAYED RELEASE ORAL
Qty: 90 CAPSULE | Refills: 1 | Status: SHIPPED | OUTPATIENT
Start: 2025-04-28

## 2025-04-29 RX ORDER — ATOMOXETINE 25 MG/1
CAPSULE ORAL DAILY
Qty: 90 CAPSULE | Refills: 1 | Status: SHIPPED | OUTPATIENT
Start: 2025-04-29